# Patient Record
Sex: FEMALE | Race: WHITE | NOT HISPANIC OR LATINO | Employment: OTHER | URBAN - METROPOLITAN AREA
[De-identification: names, ages, dates, MRNs, and addresses within clinical notes are randomized per-mention and may not be internally consistent; named-entity substitution may affect disease eponyms.]

---

## 2017-01-20 ENCOUNTER — TELEPHONE (OUTPATIENT)
Dept: PULMONOLOGY | Facility: HOSPICE | Age: 72
End: 2017-01-20

## 2017-01-20 NOTE — TELEPHONE ENCOUNTER
Pt came into the office. She was having trouble with her mask. She was requesting to have the pressure decreased. Downloaded compliance card. Reviewed with Megha Tilley, she gave me the ok to decrease pressure to 7cm. Changed pressure in the office. Informed pt that if it does not get better she may need a different mask. [place] [facility]. She said she would need an order for a mask. Informed we can do this if it is the mask causing the problem. She will be back in; in about 4 weeks.

## 2017-02-07 ENCOUNTER — HOSPITAL ENCOUNTER (OUTPATIENT)
Dept: LAB | Facility: MEDICAL CENTER | Age: 72
End: 2017-02-07
Attending: NURSE PRACTITIONER
Payer: MEDICARE

## 2017-02-07 LAB
ALBUMIN SERPL BCP-MCNC: 4.2 G/DL (ref 3.2–4.9)
ALBUMIN/GLOB SERPL: 1.4 G/DL
ALP SERPL-CCNC: 96 U/L (ref 30–99)
ALT SERPL-CCNC: 16 U/L (ref 2–50)
ANION GAP SERPL CALC-SCNC: 9 MMOL/L (ref 0–11.9)
APPEARANCE UR: CLEAR
AST SERPL-CCNC: 22 U/L (ref 12–45)
BASOPHILS # BLD AUTO: 0.06 K/UL (ref 0–0.12)
BASOPHILS NFR BLD AUTO: 0.8 % (ref 0–1.8)
BILIRUB SERPL-MCNC: 0.7 MG/DL (ref 0.1–1.5)
BILIRUB UR QL STRIP.AUTO: NEGATIVE
BUN SERPL-MCNC: 14 MG/DL (ref 8–22)
CALCIUM SERPL-MCNC: 9.6 MG/DL (ref 8.5–10.5)
CHLORIDE SERPL-SCNC: 101 MMOL/L (ref 96–112)
CHOLEST SERPL-MCNC: 159 MG/DL (ref 100–199)
CO2 SERPL-SCNC: 26 MMOL/L (ref 20–33)
COLOR UR AUTO: NORMAL
CREAT SERPL-MCNC: 0.94 MG/DL (ref 0.5–1.4)
CULTURE IF INDICATED INDCX: NO UA CULTURE
EOSINOPHIL # BLD: 0.42 K/UL (ref 0–0.51)
EOSINOPHIL NFR BLD AUTO: 5.9 % (ref 0–6.9)
ERYTHROCYTE [DISTWIDTH] IN BLOOD BY AUTOMATED COUNT: 45.7 FL (ref 35.9–50)
GLOBULIN SER CALC-MCNC: 2.9 G/DL (ref 1.9–3.5)
GLUCOSE SERPL-MCNC: 91 MG/DL (ref 65–99)
GLUCOSE UR STRIP.AUTO-MCNC: NEGATIVE MG/DL
HCT VFR BLD AUTO: 42 % (ref 37–47)
HDLC SERPL-MCNC: 50 MG/DL
HGB BLD-MCNC: 14 G/DL (ref 12–16)
IMM GRANULOCYTES # BLD AUTO: 0.02 K/UL (ref 0–0.11)
IMM GRANULOCYTES NFR BLD AUTO: 0.3 % (ref 0–0.9)
KETONES UR STRIP.AUTO-MCNC: NEGATIVE MG/DL
LDLC SERPL CALC-MCNC: 94 MG/DL
LEUKOCYTE ESTERASE UR QL STRIP.AUTO: NEGATIVE
LYMPHOCYTES # BLD: 1.89 K/UL (ref 1–4.8)
LYMPHOCYTES NFR BLD AUTO: 26.8 % (ref 22–41)
MCH RBC QN AUTO: 30.9 PG (ref 27–33)
MCHC RBC AUTO-ENTMCNC: 33.3 G/DL (ref 33.6–35)
MCV RBC AUTO: 92.7 FL (ref 81.4–97.8)
MICRO URNS: NORMAL
MONOCYTES # BLD: 0.58 K/UL (ref 0–0.85)
MONOCYTES NFR BLD AUTO: 8.2 % (ref 0–13.4)
NEUTROPHILS # BLD: 4.09 K/UL (ref 2–7.15)
NEUTROPHILS NFR BLD AUTO: 58 % (ref 44–72)
NITRITE UR QL STRIP.AUTO: NEGATIVE
NRBC # BLD AUTO: 0 K/UL
NRBC BLD-RTO: 0 /100 WBC
PH UR: 6.5 [PH]
PLATELET # BLD AUTO: 224 K/UL (ref 164–446)
PMV BLD AUTO: 9.7 FL (ref 9–12.9)
POTASSIUM SERPL-SCNC: 4.1 MMOL/L (ref 3.6–5.5)
PROT SERPL-MCNC: 7.1 G/DL (ref 6–8.2)
PROT UR QL STRIP: NEGATIVE MG/DL
RBC # BLD AUTO: 4.53 M/UL (ref 4.2–5.4)
RBC UR QL AUTO: NEGATIVE
SODIUM SERPL-SCNC: 136 MMOL/L (ref 135–145)
SP GR UR STRIP.AUTO: 1.01
TRIGL SERPL-MCNC: 74 MG/DL (ref 0–149)
TSH SERPL DL<=0.005 MIU/L-ACNC: 1.19 UIU/ML (ref 0.3–3.7)
WBC # BLD AUTO: 7.1 K/UL (ref 4.8–10.8)

## 2017-02-07 PROCEDURE — 80061 LIPID PANEL: CPT

## 2017-02-07 PROCEDURE — 80053 COMPREHEN METABOLIC PANEL: CPT

## 2017-02-07 PROCEDURE — 36415 COLL VENOUS BLD VENIPUNCTURE: CPT

## 2017-02-07 PROCEDURE — 81003 URINALYSIS AUTO W/O SCOPE: CPT

## 2017-02-07 PROCEDURE — 84443 ASSAY THYROID STIM HORMONE: CPT

## 2017-02-07 PROCEDURE — 85025 COMPLETE CBC W/AUTO DIFF WBC: CPT

## 2017-02-10 ENCOUNTER — HOSPITAL ENCOUNTER (OUTPATIENT)
Dept: RADIOLOGY | Facility: MEDICAL CENTER | Age: 72
End: 2017-02-10
Attending: FAMILY MEDICINE
Payer: MEDICARE

## 2017-02-10 DIAGNOSIS — J40 BRONCHITIS, NOT SPECIFIED AS ACUTE OR CHRONIC: ICD-10-CM

## 2017-02-10 PROCEDURE — 71020 DX-CHEST-2 VIEWS: CPT

## 2017-02-27 ENCOUNTER — APPOINTMENT (OUTPATIENT)
Dept: PULMONOLOGY | Facility: HOSPICE | Age: 72
End: 2017-02-27
Payer: MEDICARE

## 2017-03-15 ENCOUNTER — HOSPITAL ENCOUNTER (OUTPATIENT)
Dept: RADIOLOGY | Facility: MEDICAL CENTER | Age: 72
End: 2017-03-15
Attending: FAMILY MEDICINE
Payer: MEDICARE

## 2017-03-15 DIAGNOSIS — Z13.9 SCREENING: ICD-10-CM

## 2017-03-15 DIAGNOSIS — N95.1 SYMPTOMATIC MENOPAUSAL OR FEMALE CLIMACTERIC STATES: ICD-10-CM

## 2017-03-15 PROCEDURE — 77063 BREAST TOMOSYNTHESIS BI: CPT

## 2017-03-15 PROCEDURE — 77080 DXA BONE DENSITY AXIAL: CPT

## 2017-03-23 ENCOUNTER — OFFICE VISIT (OUTPATIENT)
Dept: PULMONOLOGY | Facility: HOSPICE | Age: 72
End: 2017-03-23
Payer: MEDICARE

## 2017-03-23 VITALS
SYSTOLIC BLOOD PRESSURE: 138 MMHG | RESPIRATION RATE: 15 BRPM | BODY MASS INDEX: 46.01 KG/M2 | DIASTOLIC BLOOD PRESSURE: 88 MMHG | WEIGHT: 250 LBS | HEIGHT: 62 IN | TEMPERATURE: 98.3 F | HEART RATE: 82 BPM

## 2017-03-23 DIAGNOSIS — I10 ESSENTIAL HYPERTENSION: ICD-10-CM

## 2017-03-23 DIAGNOSIS — J30.2 SEASONAL ALLERGIC RHINITIS, UNSPECIFIED ALLERGIC RHINITIS TRIGGER: ICD-10-CM

## 2017-03-23 DIAGNOSIS — G47.33 OSA (OBSTRUCTIVE SLEEP APNEA): ICD-10-CM

## 2017-03-23 DIAGNOSIS — J45.40 MODERATE PERSISTENT ASTHMA WITHOUT COMPLICATION: ICD-10-CM

## 2017-03-23 PROCEDURE — 1036F TOBACCO NON-USER: CPT | Performed by: NURSE PRACTITIONER

## 2017-03-23 PROCEDURE — 1101F PT FALLS ASSESS-DOCD LE1/YR: CPT | Mod: 8P | Performed by: NURSE PRACTITIONER

## 2017-03-23 PROCEDURE — 4040F PNEUMOC VAC/ADMIN/RCVD: CPT | Performed by: NURSE PRACTITIONER

## 2017-03-23 PROCEDURE — 3017F COLORECTAL CA SCREEN DOC REV: CPT | Performed by: NURSE PRACTITIONER

## 2017-03-23 PROCEDURE — 99214 OFFICE O/P EST MOD 30 MIN: CPT | Performed by: NURSE PRACTITIONER

## 2017-03-23 PROCEDURE — 3014F SCREEN MAMMO DOC REV: CPT | Performed by: NURSE PRACTITIONER

## 2017-03-23 PROCEDURE — G8419 CALC BMI OUT NRM PARAM NOF/U: HCPCS | Performed by: NURSE PRACTITIONER

## 2017-03-23 PROCEDURE — G8484 FLU IMMUNIZE NO ADMIN: HCPCS | Performed by: NURSE PRACTITIONER

## 2017-03-23 PROCEDURE — G8432 DEP SCR NOT DOC, RNG: HCPCS | Performed by: NURSE PRACTITIONER

## 2017-03-23 NOTE — PROGRESS NOTES
"Chief Complaint   Patient presents with   • Follow-Up     6W w/ Chip DL          HPI: This patient is a 71 y.o. female, who presents for 6 week follow-up of BEATRIZ. Also has history of asthma. In regards to asthma, former PFTs indicate an FEV1 of 2.40 L 109% predicted, no significant bronchodilator response. She has significant seasonal allergies. She remains compliant with Advair 500/50 twice a day, Singulair daily, albuterol HFA as needed, Flonase daily. She has seen an allergist in the past but has not seen anyone for many years. Her breathing remains stable. Denies dyspnea, cough or wheeze. She denies URIs and she was last seen. She is up-to-date on influenza vaccination.    In regards to BEATRIZ, former PSG indicates mild BEATRIZ with an AHI of 12.4 and a minimum saturation of 84%.  she was ordered and the new machine at her last visit. She's been unable to use this. She feels the pressure is too high. Her pressure was decreased from 9 to 7 cm H2O when she called in January. AHI on download today is 16.9. She is using a fullface mask. She feels this is comfortable. She does not notice significant air leak. She continues to feel fatigue and has occasionally headaches. Titration study is recommended. She is amenable to this. She is pending, left knee surgery.    She has a difficult time with recalling words, memory and speech due to previous infection.    Past Medical History   Diagnosis Date   • GERD (gastroesophageal reflux disease)    • Arthritis    • Asthma    • Hypertension    • Depression    • Hiatus hernia syndrome    • Snoring    • Sleep apnea      CPAP   • Pain      back, knees   • Thrush      from \"Advair\", takes nystatin   • Sorethroat 2/09/2016   • GERD (gastroesophageal reflux disease)    • Obstructive sleep apnea    • DJD (degenerative joint disease)    • Carpal tunnel syndrome    • Obesity        Social History   Substance Use Topics   • Smoking status: Former Smoker -- 1.00 packs/day for 6 years     Types: " Cigarettes     Start date: 01/01/1972     Quit date: 01/01/1978   • Smokeless tobacco: None      Comment: 1978   • Alcohol Use: No       Family History   Problem Relation Age of Onset   • Heart Disease     • Hypertension     • Stroke     • Lung Disease     • Heart Disease Father        Current medications as of today   Current Outpatient Prescriptions   Medication Sig Dispense Refill   • Azelastine HCl 0.15 % Solution Spray 1-2 Sprays in nose 2 times a day as needed.     • losartan-hydrochlorothiazide (HYZAAR) 100-25 MG per tablet Take 1 Tab by mouth every day.     • ibuprofen (MOTRIN) 800 MG Tab Take 800 mg by mouth every 8 hours as needed.     • guaifenesin-codeine (CHERATUSSIN AC) Solution oral solution Take 5-10 mL by mouth every 6 hours as needed for Cough. 90 mL 0   • diphenhydrAMINE (BENADRYL) 50 MG Cap Take 50 mg by mouth at bedtime as needed.     • gabapentin (NEURONTIN) 300 MG CAPS Take 1 Cap by mouth 3 times a day. 90 Cap 0   • fluticasone-salmeterol (ADVAIR) 500-50 MCG/DOSE AEPB Inhale 1 Puff by mouth every 12 hours.     • albuterol (PROAIR HFA) 108 (90 BASE) MCG/ACT AERS Inhale 2 Puffs by mouth every 6 hours as needed. Shortness of breath  Indications: Asthma     • losartan (COZAAR) 100 MG TABS Take 100 mg by mouth every day.     • nystatin (MYCOSTATIN) 681845 UNIT/ML SUSP Take 100,000 Units by mouth 5 Times a Day.     • Multiple Vitamins-Minerals (BODY/HAIR/SKIN/NAILS PO) Take 1 Cap by mouth every day.     • montelukast (SINGULAIR) 10 MG TABS Take 10 mg by mouth every morning.     • trazodone (DESYREL) 50 MG TABS Take 50 mg by mouth every evening.     • fluticasone (FLONASE) 50 MCG/ACT nasal spray Spray 1 Spray in nose every morning. Each Nostril     • omeprazole (PRILOSEC) 20 MG CPDR Take 20 mg by mouth 2 Times a Day.     • albuterol 108 (90 BASE) MCG/ACT Aero Soln inhalation aerosol Inhale 2 Puffs by mouth every 6 hours as needed for Shortness of Breath.     • levofloxacin (LEVAQUIN) 500 MG tablet  "Take 1 Tab by mouth every day. 10 Tab 0   • sertraline (ZOLOFT) 100 MG TABS Take 100 mg by mouth every morning.       No current facility-administered medications for this visit.       Allergies: Allergy; Cefepime; Cephalosporins; Clarithromycin; Cleocin; Meropenem; Morphine; and Sulfamethoxazole-trimethoprim    Blood pressure 138/88, pulse 82, temperature 36.8 °C (98.3 °F), temperature source Oral, resp. rate 15, height 1.575 m (5' 2\"), weight 113.399 kg (250 lb).      ROS:   Constitutional: Denies fevers, chills, night sweats, weight loss. Positive for fatigue.  HEENT: Denies earache, difficulty hearing, tinnitus, nasal congestion, hoarseness  Cardiovascular: Denies chest pain, tightness, palpitations, orthopnea or edema  Respiratory: See HPI  Sleep: See history of present illness  GI: Denies heartburn, dysphagia, nausea, abdominal pain, diarrhea or constipation  : Denies frequent urination, hematuria, discharge or painful urination  Musculoskeletal: Positive for back pain and knee pain  Neurological: Denies weakness or headaches  Skin: No rashes    Physical exam:   Appearance: Obese, in no acute distress  HEENT: Normocephalic, atraumatic, white sclera, PERRLA  Respiratory: no intercostal retractions or accessory muscle use   Lungs auscultation: Diminished breath sounds bilateral bases, otherwise clear   Cardiovascular: Regular rate rhythm no murmurs, rubs or gallops  Gait: Normal  Digits: No clubbing, cyanosis  Motor: No focal deficits  Orientation: Oriented to time, person and place    Diagnosis:  1. BEATRIZ (obstructive sleep apnea)  POLYSOMNOGRAPHY TITRATION   2. Moderate persistent asthma without complication     3. Essential hypertension     4. Seasonal allergic rhinitis, unspecified allergic rhinitis trigger         Plan:  1. Continue Advair 230/21 µg, 2 puffs, twice a day.  2. Continue albuterol as needed  3. Continue OTC antihistamine daily  4. Titration study ASAP.  5. Follow up after testing to " review

## 2017-03-23 NOTE — PATIENT INSTRUCTIONS
1. Continue respiratory medications  2. Continue OTC antihistamine  3. Titration study ASAP  4. Follow up after testing to review results

## 2017-03-23 NOTE — MR AVS SNAPSHOT
"        Cyndy Petit   3/23/2017 2:20 PM   Office Visit   MRN: [id number]    Department:  [facility]   Dept Phone:  [phone]    Description:  Female : 1945   Provider:  JACK Gilbert           Reason for Visit     Follow-Up 6W w/ Chip DL       Allergies as of 3/23/2017     Allergen Noted Reactions    Allergy 2013       Unknown antibiotic    Cefepime 2010   Hives    Cephalosporins 2010   Hives    Clarithromycin 2010   Hives    Cleocin [Clindamycin] 2016   Itching    Meropenem 2013       Morphine 2013   Itching    Sulfamethoxazole-Trimethoprim 2013   Itching      You were diagnosed with     BEATRIZ (obstructive sleep apnea)   [213793]       Moderate persistent asthma without complication   [957882]       Essential hypertension   [0310637]       Seasonal allergic rhinitis, unspecified allergic rhinitis trigger   [4708432]         Vital Signs     Blood Pressure Pulse Temperature Respirations Height Weight    138/88 mmHg 82 36.8 °C (98.3 °F) (Oral) 15 1.575 m (5' 2\") 113.399 kg (250 lb)    Body Mass Index Smoking Status                45.71 kg/m2 Former Smoker          Basic Information     Date Of Birth Sex Race Ethnicity Preferred Language    1945 Female White Non- English      Problem List              ICD-10-CM Priority Class Noted - Resolved    Back pain M54.9   2013 - Present    Hyponatremia E87.1 Low  2013 - Present    GERD (gastroesophageal reflux disease) K21.9 Low  2013 - Present    HTN (hypertension) I10 Medium  2013 - Present    S/P laminectomy Z98.890 High  2013 - Present    Lumbosacral radiculopathy at S1 M54.17 High  2013 - Present    Hyperglycemia R73.9 Medium  2013 - Present    Primary osteoarthritis of left knee M17.12   3/7/2016 - Present    Hypokalemia E87.6   3/10/2016 - Present    H/O knee surgery Z98.890   3/10/2016 - Present    Pain and swelling of left knee " M25.562, M25.462   3/10/2016 - Present    Moderate persistent asthma without complication J45.40   12/6/2016 - Present    BEATRIZ (obstructive sleep apnea) G47.33   12/6/2016 - Present    Seasonal allergies J30.2   12/6/2016 - Present      Health Maintenance        Date Due Completion Dates    IMM DTaP/Tdap/Td Vaccine (1 - Tdap) 11/25/1964 ---    PAP SMEAR 11/25/1966 ---    COLONOSCOPY 11/25/1995 ---    IMM ZOSTER VACCINE 11/25/2005 ---    IMM PNEUMOCOCCAL 65+ (ADULT) LOW/MEDIUM RISK SERIES (1 of 2 - PCV13) 11/25/2010 1/1/2009    IMM INFLUENZA (1) 9/1/2016 10/1/2015, 9/11/2013, 10/1/2012    MAMMOGRAM 3/15/2018 3/15/2017, 12/10/2015, 8/18/2014, 8/14/2013, 7/30/2012, 7/21/2011, 7/20/2010, 7/20/2010, 2/11/2008, 2/11/2008, 1/19/2007, 1/18/2006    BONE DENSITY 3/15/2022 3/15/2017, 8/18/2014, 8/26/2010, 1/19/2007            Current Immunizations     Influenza TIV (IM) 10/1/2015, 9/11/2013  4:37 AM, 10/1/2012    Pneumococcal polysaccharide vaccine (PPSV-23) 1/1/2009      Below and/or attached are the medications your provider expects you to take. Review all of your home medications and newly ordered medications with your provider and/or pharmacist. Follow medication instructions as directed by your provider and/or pharmacist. Please keep your medication list with you and share with your provider. Update the information when medications are discontinued, doses are changed, or new medications (including over-the-counter products) are added; and carry medication information at all times in the event of emergency situations     Allergies:  ALLERGY - (reactions not documented)     CEFEPIME - Hives     CEPHALOSPORINS - Hives     CLARITHROMYCIN - Hives     CLEOCIN - Itching     MEROPENEM - (reactions not documented)     MORPHINE - Itching     SULFAMETHOXAZOLE-TRIMETHOPRIM - Itching               Medications  Valid as of: March 23, 2017 -  2:50 PM    Generic Name Brand Name Tablet Size Instructions for use    Albuterol Sulfate (Aero  Soln) albuterol 108 (90 BASE) MCG/ACT Inhale 2 Puffs by mouth every 6 hours as needed. Shortness of breath  Indications: Asthma        Albuterol Sulfate (Aero Soln) albuterol 108 (90 BASE) MCG/ACT Inhale 2 Puffs by mouth every 6 hours as needed for Shortness of Breath.        Azelastine HCl (Solution) Azelastine HCl 0.15 % Spray 1-2 Sprays in nose 2 times a day as needed.        DiphenhydrAMINE HCl (Cap) BENADRYL 50 MG Take 50 mg by mouth at bedtime as needed.        Fluticasone Propionate (Suspension) FLONASE 50 MCG/ACT Spray 1 Spray in nose every morning. Each Nostril        Fluticasone-Salmeterol (AEROSOL POWDER, BREATH ACTIVATED) ADVAIR 500-50 MCG/DOSE Inhale 1 Puff by mouth every 12 hours.        Gabapentin (Cap) NEURONTIN 300 MG Take 1 Cap by mouth 3 times a day.        Guaifenesin-Codeine (Solution) ROBITUSSIN -10 mg/5mL Take 5-10 mL by mouth every 6 hours as needed for Cough.        Ibuprofen (Tab) MOTRIN 800 MG Take 800 mg by mouth every 8 hours as needed.        LevoFLOXacin (Tab) LEVAQUIN 500 MG Take 1 Tab by mouth every day.        Losartan Potassium (Tab) COZAAR 100 MG Take 100 mg by mouth every day.        Losartan Potassium-HCTZ (Tab) HYZAAR 100-25 MG Take 1 Tab by mouth every day.        Montelukast Sodium (Tab) SINGULAIR 10 MG Take 10 mg by mouth every morning.        Multiple Vitamins-Minerals   Take 1 Cap by mouth every day.        Nystatin (Suspension) MYCOSTATIN 140261 UNIT/ML Take 100,000 Units by mouth 5 Times a Day.        Omeprazole (CAPSULE DELAYED RELEASE) PRILOSEC 20 MG Take 20 mg by mouth 2 Times a Day.        Sertraline HCl (Tab) ZOLOFT 100 MG Take 100 mg by mouth every morning.        TraZODone HCl (Tab) DESYREL 50 MG Take 50 mg by mouth every evening.        .                 Medicines prescribed today were sent to:     [facility] [address] [place] [address], [place] - [address] [facility]    [address] [facility] [place] [address]    Phone: [phone] Fax: [phone]    Open 24 Hours?:  No      Medication refill instructions:       If your prescription bottle indicates you have medication refills left, it is not necessary to call your provider’s office. Please contact your pharmacy and they will refill your medication.    If your prescription bottle indicates you do not have any refills left, you may request refills at any time through one of the following ways: The online zealot network system (except Urgent Care), by calling your provider’s office, or by asking your pharmacy to contact your provider’s office with a refill request. Medication refills are processed only during regular business hours and may not be available until the next business day. Your provider may request additional information or to have a follow-up visit with you prior to refilling your medication.   *Please Note: Medication refills are assigned a new Rx number when refilled electronically. Your pharmacy may indicate that no refills were authorized even though a new prescription for the same medication is available at the pharmacy. Please request the medicine by name with the pharmacy before contacting your provider for a refill.        Your To Do List     Future Labs/Procedures Complete By Expires    POLYSOMNOGRAPHY TITRATION  As directed 3/23/2018      Instructions    1. Continue respiratory medications  2. Continue OTC antihistamine  3. Titration study ASAP  4. Follow up after testing to review results          zealot network Access Code: Activation code not generated  Current zealot network Status: Active

## 2017-03-28 ENCOUNTER — SLEEP STUDY (OUTPATIENT)
Dept: SLEEP MEDICINE | Facility: MEDICAL CENTER | Age: 72
End: 2017-03-28
Attending: NURSE PRACTITIONER
Payer: MEDICARE

## 2017-03-28 DIAGNOSIS — G47.33 OSA (OBSTRUCTIVE SLEEP APNEA): ICD-10-CM

## 2017-03-28 PROCEDURE — 95811 POLYSOM 6/>YRS CPAP 4/> PARM: CPT | Performed by: INTERNAL MEDICINE

## 2017-03-29 NOTE — PROCEDURES
Clinical Comments:  The patient underwent a comprehensive polysomnogram using the standard montage for measurement of parameters of sleep, respiratory events, movement abnormalities, heart rate and rhythm. A microphone was used to monitor snoring.      INTERPRETATION:  The total recording time was 457.3 minutes with a sleep period of 456.3 minutes and the total sleep time was 432.0 minutes with a sleep efficiency of 94.5%.  The sleep latency was 1.1 minutes, and REM latency was 134.5 minutes.  The patient experienced 48 arousals in total, for an arousal index of 6.7    RESPIRATORY: The patient had 11 apneas in total.  Of these, 1 were obstructive apneas, and 10 were central apneas.  This resulted in an apnea index (AI) of 1.5.  The patient had 20 hypopneas, for a hypopnea index of 2.8.  The overall AHI was 4.3, while the AHI during Stage R sleep was 8.5.  AHI while supine was 9.8.    OXIMETRY: Oxygen saturation monitoring showed a mean SpO2 of 93.3%, with a minimum oxygen saturation of 81.0%.  Oxygen saturations were less than or = 89% for 2.8 minutes of sleep time.    CARDIAC: The highest heart rate during the recording was 113.0 beats per minute.  The average heart rate during sleep was 67.7 bpm.    LIMB MOVEMENTS: There were a total of 0 PLMs during sleep, of which 0 were PLMs arousals.  This resulted in a PLMS index of 0.0.    71-year-old with a history of hypertension and asthma as well as sleep apnea who comes in for a CPAP titration reevaluation study.    Technical summary: The patient underwent a CPAP titration.  This was a 16 channel montage study to include a 6 channel EEG, a 2 channel EOG, and chin EMG, left and right leg EMG, a snore channel, and a CFLOW pressure transducer.   Respiratory effort was assessed with the use of a thoracic and abdominal monitor and overnight oximetry was obtained. Audio and video recordings were reviewed. This was a fully attended study and sleep stage scoring was  performed. The test was technically adequate.    General sleep summary:  During the overnight study, there was a normal sleep latency and mildly prolonged REM latency.   The total sleep time was 432 minute and sleep efficiency was 95%.  Sleep stage proportions showed 2% stage I, 57% stage II, 8% delta sleep and 32% REM sleep.  In regards to sleep quality there was a normal degree of sleep fragmentation as shown by the arousal index of 7 an hour. The arousals were due to some respiratory effort related arousals and hypopneas.    CPAP Titration:  The CPAP pressure was initiated at 4 cm of water and the pressure was increased in an attempt to eliminate all sleep disordered breathing and snoring. The CPAP pressure was increased to 14 cm water and at this final pressure the patient was observed in the supine position and in the REM sleep stage. The apnea hypopnea index was 0.0 per hour and the low oxygen saturation 92%. Higher CPAP pressures were applied up to 16 cm water however these higher pressures did not appear to improve breathing or sleep quality beyond what was observed at a pressure of 14 cm of water. Snoring was resolved. There were no significant periodic limb movements.  The patient demonstrated normal sinus rhythm and an average heart rate of 67 beats per minute. Rare PVCs were observed however there was no tachyarrhythmias. The patient utilized a small air-fit F10 full face mask with heated humidification. The CPAP was well-tolerated and there were minimal air leaks. No supplemental oxygen was required.    Impression:  1. Successful CPAP titration to 14 cm of water using a small AirFit F10 full face mask and heated humidity  2. Obstructive sleep apnea  3. Hypertension  4. Asthma  5. Rare PVCs    Recommendations:  Recommend CPAP at the above settings. Recommended a data card that can measure leak, apnea hypopnea index and compliance for further outpatient monitoring and management of CPAP therapy. In some  cases alternative treatment options may prove effective in resolving sleep apnea and these options include upper airway surgery, the use of a dental orthotic or weight loss and positional therapy. Rare PVCs were observed and clinical correlation is required in regards to cardiology evaluation for PVCs. In general patients with sleep apnea are advised to avoid alcohol and sedatives and to not operate a motor vehicle while drowsy. Also untreated sleep apnea is associated with an increased risk for cardiovascular disease.

## 2017-04-05 ENCOUNTER — OFFICE VISIT (OUTPATIENT)
Dept: PULMONOLOGY | Facility: HOSPICE | Age: 72
End: 2017-04-05
Payer: MEDICARE

## 2017-04-05 VITALS
HEART RATE: 70 BPM | SYSTOLIC BLOOD PRESSURE: 140 MMHG | RESPIRATION RATE: 16 BRPM | TEMPERATURE: 97.5 F | DIASTOLIC BLOOD PRESSURE: 80 MMHG | WEIGHT: 250 LBS | BODY MASS INDEX: 46.01 KG/M2 | HEIGHT: 62 IN

## 2017-04-05 DIAGNOSIS — G47.33 OSA (OBSTRUCTIVE SLEEP APNEA): ICD-10-CM

## 2017-04-05 DIAGNOSIS — J45.40 MODERATE PERSISTENT ASTHMA WITHOUT COMPLICATION: ICD-10-CM

## 2017-04-05 PROCEDURE — 1101F PT FALLS ASSESS-DOCD LE1/YR: CPT | Mod: 8P | Performed by: NURSE PRACTITIONER

## 2017-04-05 PROCEDURE — 99213 OFFICE O/P EST LOW 20 MIN: CPT | Performed by: NURSE PRACTITIONER

## 2017-04-05 PROCEDURE — G8432 DEP SCR NOT DOC, RNG: HCPCS | Performed by: NURSE PRACTITIONER

## 2017-04-05 PROCEDURE — 3017F COLORECTAL CA SCREEN DOC REV: CPT | Performed by: NURSE PRACTITIONER

## 2017-04-05 PROCEDURE — 3014F SCREEN MAMMO DOC REV: CPT | Performed by: NURSE PRACTITIONER

## 2017-04-05 PROCEDURE — G8419 CALC BMI OUT NRM PARAM NOF/U: HCPCS | Performed by: NURSE PRACTITIONER

## 2017-04-05 PROCEDURE — 4040F PNEUMOC VAC/ADMIN/RCVD: CPT | Performed by: NURSE PRACTITIONER

## 2017-04-05 PROCEDURE — 1036F TOBACCO NON-USER: CPT | Performed by: NURSE PRACTITIONER

## 2017-04-05 RX ORDER — ALBUTEROL SULFATE 2.5 MG/3ML
SOLUTION RESPIRATORY (INHALATION)
COMMUNITY
Start: 2017-02-14 | End: 2017-11-14

## 2017-04-05 RX ORDER — ESCITALOPRAM OXALATE 20 MG/1
TABLET ORAL
COMMUNITY
Start: 2017-04-04 | End: 2017-08-21

## 2017-04-05 RX ORDER — PREDNISONE 20 MG/1
TABLET ORAL
COMMUNITY
Start: 2017-02-09 | End: 2017-04-17

## 2017-04-05 RX ORDER — ESCITALOPRAM OXALATE 10 MG/1
TABLET ORAL
COMMUNITY
Start: 2017-02-09 | End: 2017-04-17

## 2017-04-05 NOTE — PROGRESS NOTES
"Chief Complaint   Patient presents with   • Results     SS         HPI:  This is a 71 y.o. female with a history of asthma and obstructive sleep apnea. Pulmonary function tests from 6/16/15 indicate FEV1 2.4 L, 109% predicted, FEV1/FVC 76%, FEF 25-75% 99% predicted, and DLCO 150% predicted. The patient is compliant with Advair 504/50 µg twice daily, Singulair nightly, and albuterol HFA as needed. The patient reports that her breathing is stable. She denies significant shortness of breath, cough, wheezing, fever, chills, sweats, or hemoptysis.    Polysomnogram indicates AHI 12.4 and minimum saturation 84%. The patient is compliant with CPAP 7 cm. Compliance dated 3/6-4/4/17 indicates 67% complaints for 4 hours 38 minutes. The patient's AHI is still elevated at 13.7. Due to ongoing elevated AHI the patient has underwent titration study. The patient was titrated on CPAP 6-16 centimeters. On CPAP 16 cm the patient had when and where 2 minutes of REM sleep, AHI was reduced to 3.2, and basal saturation was 93%. The patient feels she is going to tolerate higher pressure. Currently she is tired all the time. She is having some issues with a dry mouth. She has adjusted her humidifier. She has a heated tube.      Past Medical History   Diagnosis Date   • GERD (gastroesophageal reflux disease)    • Arthritis    • Asthma    • Hypertension    • Depression    • Hiatus hernia syndrome    • Snoring    • Sleep apnea      CPAP   • Pain      back, knees   • Thrush      from \"Advair\", takes nystatin   • Sorethroat 2/09/2016   • GERD (gastroesophageal reflux disease)    • Obstructive sleep apnea    • DJD (degenerative joint disease)    • Carpal tunnel syndrome    • Obesity        Past Surgical History   Procedure Laterality Date   • Foraminotomy  11/26/08     Performed by [facility] STALLWORTH at SURGERY [facility] ORS   • Hardware removal neuro  11/26/08     Performed by [facility] STALLWORTH at SURGERY [facility] ORS   • Other neurological " "surg  2/2009     I&D of brain from sinus infection   • Cholecystectomy  2010     laparoscopic   • Lumbar laminectomy diskectomy  5/30/2013     Performed by Kaushal Ayala M.D. at SURGERY [facility]   • Lumbar fusion posterior  9/13/2013     Performed by Kaushal Ayala M.D. at SURGERY [facility]   • Lumbar laminectomy diskectomy  9/13/2013     Performed by Kaushal Ayala M.D. at SURGERY [facility]   • Carpal tunnel release Right 2000   • Lumbar fusion posterior  2005   • Shoulder arthrotomy Right 2000   • Knee arthroscopy Right 2008   • Knee arthroplasty total Left 3/7/2016     Procedure: KNEE ARTHROPLASTY TOTAL;  Surgeon: Jesús Rutledge M.D.;  Location: SURGERY [facility];  Service:        Social History   Substance Use Topics   • Smoking status: Former Smoker -- 1.00 packs/day for 6 years     Types: Cigarettes     Start date: 01/01/1972     Quit date: 01/01/1978   • Smokeless tobacco: None      Comment: 1978   • Alcohol Use: No       ROS:   Constitutional: Denies fevers, chills, sweats, fatigue, and weight loss.  Eyes: Glasses.  Ears/nose/mouth/throat: Denies injury.  Cardiovascular: Denies chest pain, tightness.  Respiratory: See history of present illness.  GI: Denies heartburn, difficulty swallowing, nausea, and vomiting.  Neurological: Denies frequent headaches, dizziness, weakness.    Vitals:  Filed Vitals:    04/05/17 1017   Height: 1.575 m (5' 2.01\")   Weight: 113.399 kg (250 lb)   Weight % change since last entry.: 0 %   BP: 140/80   Pulse: 70   BMI (Calculated): 45.71   Resp: 16   Temp: 36.4 °C (97.5 °F)   O2 sat % room air: 94 %       Allergies:  Allergy; Cefepime; Cephalosporins; Clarithromycin; Cleocin; Meropenem; Morphine; and Sulfamethoxazole-trimethoprim    Medications:  Current Outpatient Prescriptions   Medication Sig Dispense Refill   • escitalopram (LEXAPRO) 20 MG tablet      • albuterol (PROVENTIL) 2.5mg/3ml Nebu Soln solution for nebulization      • Azelastine HCl 0.15 " % Solution Spray 1-2 Sprays in nose 2 times a day as needed.     • ibuprofen (MOTRIN) 800 MG Tab Take 800 mg by mouth every 8 hours as needed.     • guaifenesin-codeine (CHERATUSSIN AC) Solution oral solution Take 5-10 mL by mouth every 6 hours as needed for Cough. 90 mL 0   • gabapentin (NEURONTIN) 300 MG CAPS Take 1 Cap by mouth 3 times a day. 90 Cap 0   • fluticasone-salmeterol (ADVAIR) 500-50 MCG/DOSE AEPB Inhale 1 Puff by mouth every 12 hours.     • albuterol (PROAIR HFA) 108 (90 BASE) MCG/ACT AERS Inhale 2 Puffs by mouth every 6 hours as needed. Shortness of breath  Indications: Asthma     • losartan (COZAAR) 100 MG TABS Take 100 mg by mouth every day.     • nystatin (MYCOSTATIN) 953537 UNIT/ML SUSP Take 100,000 Units by mouth 5 Times a Day.     • Multiple Vitamins-Minerals (BODY/HAIR/SKIN/NAILS PO) Take 1 Cap by mouth every day.     • montelukast (SINGULAIR) 10 MG TABS Take 10 mg by mouth every morning.     • trazodone (DESYREL) 50 MG TABS Take 50 mg by mouth every evening.     • fluticasone (FLONASE) 50 MCG/ACT nasal spray Spray 1 Spray in nose every morning. Each Nostril     • omeprazole (PRILOSEC) 20 MG CPDR Take 20 mg by mouth 2 Times a Day.     • escitalopram (LEXAPRO) 10 MG Tab      • predniSONE (DELTASONE) 20 MG Tab      • albuterol 108 (90 BASE) MCG/ACT Aero Soln inhalation aerosol Inhale 2 Puffs by mouth every 6 hours as needed for Shortness of Breath.     • losartan-hydrochlorothiazide (HYZAAR) 100-25 MG per tablet Take 1 Tab by mouth every day.     • levofloxacin (LEVAQUIN) 500 MG tablet Take 1 Tab by mouth every day. 10 Tab 0   • diphenhydrAMINE (BENADRYL) 50 MG Cap Take 50 mg by mouth at bedtime as needed.     • sertraline (ZOLOFT) 100 MG TABS Take 100 mg by mouth every morning.       No current facility-administered medications for this visit.       PHYSICAL EXAM:  Appearance: Well-developed, well-nourished, no acute distress.  Eyes. PERRL.  Hearing: Grossly intact.  Oropharynx: Tongue normal,  posterior pharynx without erythema or exudate.  Respiratory effort: No intercostal retractions or use of accessory muscles.  Lung auscultation: No crackles, wheezing.  Heart auscultation: No murmur, gallop, or rub. Regular rate and rhythm.  Extremities: No cyanosis or edema.  Gait and Station: Ambulates with cane  Orientation: Oriented to time, place, and person.    Assessment:  1. Moderate persistent asthma without complication     2. BEATRIZ (obstructive sleep apnea)  DME OTHER         Plan:  1. Increase CPAP to 16 cm.  2. Clean equipment weekly and replace supplies as allowed by insurance.  3. Patient encouraged to participate in daily exercise.  4. Continue Advair 500/50 µg twice daily, Singulair daily, and albuterol HFA as needed.  5. Follow up in approximately 2 months with RODO Pearl.    Return in about 2 months (around 6/5/2017).

## 2017-04-05 NOTE — MR AVS SNAPSHOT
"        Cyndy Sharpe Petit   2017 10:40 AM   Office Visit   MRN: [id number]    Department:  [facility]   Dept Phone:  [phone]    Description:  Female : 1945   Provider:  TANGELA Villagran           Reason for Visit     Results SS      Allergies as of 2017     Allergen Noted Reactions    Allergy 2013       Unknown antibiotic    Cefepime 2010   Hives    Cephalosporins 2010   Hives    Clarithromycin 2010   Hives    Cleocin [Clindamycin] 2016   Itching    Meropenem 2013       Morphine 2013   Itching    Sulfamethoxazole-Trimethoprim 2013   Itching      You were diagnosed with     Moderate persistent asthma without complication   [511205]       BEATRIZ (obstructive sleep apnea)   [559792]         Vital Signs     Blood Pressure Pulse Temperature Respirations Height Weight    140/80 mmHg 70 36.4 °C (97.5 °F) 16 1.575 m (5' 2.01\") 113.399 kg (250 lb)    Body Mass Index Smoking Status                45.71 kg/m2 Former Smoker          Basic Information     Date Of Birth Sex Race Ethnicity Preferred Language    1945 Female White Non- English      Your appointments     Arturo 15, 2017 11:20 AM   Established Patient Pul with JACK Gilbert   [facility] (--)    [address] [place] [address] [address]  [facility] [id number] [phone]              Problem List              ICD-10-CM Priority Class Noted - Resolved    Back pain M54.9   2013 - Present    Hyponatremia E87.1 Low  2013 - Present    GERD (gastroesophageal reflux disease) K21.9 Low  2013 - Present    HTN (hypertension) I10 Medium  2013 - Present    S/P laminectomy Z98.890 High  2013 - Present    Lumbosacral radiculopathy at S1 M54.17 High  2013 - Present    Hyperglycemia R73.9 Medium  2013 - Present    Primary osteoarthritis of left knee M17.12   3/7/2016 - Present    Hypokalemia E87.6   3/10/2016 - Present    H/O knee " surgery Z98.890   3/10/2016 - Present    Pain and swelling of left knee M25.562, M25.462   3/10/2016 - Present    Moderate persistent asthma without complication J45.40   12/6/2016 - Present    BEATRIZ (obstructive sleep apnea) G47.33   12/6/2016 - Present    Seasonal allergies J30.2   12/6/2016 - Present      Health Maintenance        Date Due Completion Dates    IMM DTaP/Tdap/Td Vaccine (1 - Tdap) 11/25/1964 ---    PAP SMEAR 11/25/1966 ---    COLONOSCOPY 11/25/1995 ---    IMM ZOSTER VACCINE 11/25/2005 ---    IMM PNEUMOCOCCAL 65+ (ADULT) LOW/MEDIUM RISK SERIES (1 of 2 - PCV13) 11/25/2010 1/1/2009    MAMMOGRAM 3/15/2018 3/15/2017, 12/10/2015, 8/18/2014, 8/14/2013, 7/30/2012, 7/21/2011, 7/20/2010, 7/20/2010, 2/11/2008, 2/11/2008, 1/19/2007, 1/18/2006    BONE DENSITY 3/15/2022 3/15/2017, 8/18/2014, 8/26/2010, 1/19/2007            Current Immunizations     13-VALENT PCV PREVNAR 10/1/2015    Influenza TIV (IM) 10/1/2016, 10/1/2015, 9/11/2013  4:37 AM    Pneumococcal polysaccharide vaccine (PPSV-23) 1/1/2009      Below and/or attached are the medications your provider expects you to take. Review all of your home medications and newly ordered medications with your provider and/or pharmacist. Follow medication instructions as directed by your provider and/or pharmacist. Please keep your medication list with you and share with your provider. Update the information when medications are discontinued, doses are changed, or new medications (including over-the-counter products) are added; and carry medication information at all times in the event of emergency situations     Allergies:  ALLERGY - (reactions not documented)     CEFEPIME - Hives     CEPHALOSPORINS - Hives     CLARITHROMYCIN - Hives     CLEOCIN - Itching     MEROPENEM - (reactions not documented)     MORPHINE - Itching     SULFAMETHOXAZOLE-TRIMETHOPRIM - Itching               Medications  Valid as of: April 05, 2017 - 11:10 AM    Generic Name Brand Name Tablet Size  Instructions for use    Albuterol Sulfate (Aero Soln) albuterol 108 (90 BASE) MCG/ACT Inhale 2 Puffs by mouth every 6 hours as needed. Shortness of breath  Indications: Asthma        Albuterol Sulfate (Aero Soln) albuterol 108 (90 BASE) MCG/ACT Inhale 2 Puffs by mouth every 6 hours as needed for Shortness of Breath.        Albuterol Sulfate (Nebu Soln) PROVENTIL 2.5mg/3ml         Azelastine HCl (Solution) Azelastine HCl 0.15 % Spray 1-2 Sprays in nose 2 times a day as needed.        DiphenhydrAMINE HCl (Cap) BENADRYL 50 MG Take 50 mg by mouth at bedtime as needed.        Escitalopram Oxalate (Tab) LEXAPRO 20 MG         Escitalopram Oxalate (Tab) LEXAPRO 10 MG         Fluticasone Propionate (Suspension) FLONASE 50 MCG/ACT Spray 1 Spray in nose every morning. Each Nostril        Fluticasone-Salmeterol (AEROSOL POWDER, BREATH ACTIVATED) ADVAIR 500-50 MCG/DOSE Inhale 1 Puff by mouth every 12 hours.        Gabapentin (Cap) NEURONTIN 300 MG Take 1 Cap by mouth 3 times a day.        Guaifenesin-Codeine (Solution) ROBITUSSIN -10 mg/5mL Take 5-10 mL by mouth every 6 hours as needed for Cough.        Ibuprofen (Tab) MOTRIN 800 MG Take 800 mg by mouth every 8 hours as needed.        LevoFLOXacin (Tab) LEVAQUIN 500 MG Take 1 Tab by mouth every day.        Losartan Potassium (Tab) COZAAR 100 MG Take 100 mg by mouth every day.        Losartan Potassium-HCTZ (Tab) HYZAAR 100-25 MG Take 1 Tab by mouth every day.        Montelukast Sodium (Tab) SINGULAIR 10 MG Take 10 mg by mouth every morning.        Multiple Vitamins-Minerals   Take 1 Cap by mouth every day.        Nystatin (Suspension) MYCOSTATIN 705374 UNIT/ML Take 100,000 Units by mouth 5 Times a Day.        Omeprazole (CAPSULE DELAYED RELEASE) PRILOSEC 20 MG Take 20 mg by mouth 2 Times a Day.        PredniSONE (Tab) DELTASONE 20 MG         Sertraline HCl (Tab) ZOLOFT 100 MG Take 100 mg by mouth every morning.        TraZODone HCl (Tab) DESYREL 50 MG Take 50 mg by mouth  every evening.        .                 Medicines prescribed today were sent to:     [facility] [address] [facility] [place] [address] [facility]    [address] [facility] [place] [address]    Phone: [phone] Fax: [phone]    Open 24 Hours?: No      Medication refill instructions:       If your prescription bottle indicates you have medication refills left, it is not necessary to call your provider’s office. Please contact your pharmacy and they will refill your medication.    If your prescription bottle indicates you do not have any refills left, you may request refills at any time through one of the following ways: The online School Admissions system (except Urgent Care), by calling your provider’s office, or by asking your pharmacy to contact your provider’s office with a refill request. Medication refills are processed only during regular business hours and may not be available until the next business day. Your provider may request additional information or to have a follow-up visit with you prior to refilling your medication.   *Please Note: Medication refills are assigned a new Rx number when refilled electronically. Your pharmacy may indicate that no refills were authorized even though a new prescription for the same medication is available at the pharmacy. Please request the medicine by name with the pharmacy before contacting your provider for a refill.           [employer] Admissions Access Code: Activation code not generated  Current School Admissions Status: Active

## 2017-04-05 NOTE — PATIENT INSTRUCTIONS
1. Increase CPAP to 16 cm.  2. Clean equipment weekly and replace supplies as allowed by insurance.  3. Patient encouraged to participate in daily exercise.  4. Continue Advair 500/50 µg twice daily, Singulair daily, and albuterol HFA as needed.  5. Follow up in approximately 2 months with RODO Pearl.

## 2017-04-17 DIAGNOSIS — Z01.810 PRE-OPERATIVE CARDIOVASCULAR EXAMINATION: ICD-10-CM

## 2017-04-17 DIAGNOSIS — Z01.812 PRE-OPERATIVE LABORATORY EXAMINATION: ICD-10-CM

## 2017-04-17 LAB
ANION GAP SERPL CALC-SCNC: 5 MMOL/L (ref 0–11.9)
APPEARANCE UR: CLEAR
BILIRUB UR QL STRIP.AUTO: NEGATIVE
BUN SERPL-MCNC: 15 MG/DL (ref 8–22)
CALCIUM SERPL-MCNC: 9 MG/DL (ref 8.5–10.5)
CHLORIDE SERPL-SCNC: 101 MMOL/L (ref 96–112)
CO2 SERPL-SCNC: 27 MMOL/L (ref 20–33)
COLOR UR: YELLOW
CREAT SERPL-MCNC: 0.99 MG/DL (ref 0.5–1.4)
CULTURE IF INDICATED INDCX: YES UA CULTURE
EKG IMPRESSION: NORMAL
EPI CELLS #/AREA URNS HPF: NORMAL /HPF
ERYTHROCYTE [DISTWIDTH] IN BLOOD BY AUTOMATED COUNT: 43.5 FL (ref 35.9–50)
GFR SERPL CREATININE-BSD FRML MDRD: 55 ML/MIN/1.73 M 2
GLUCOSE SERPL-MCNC: 100 MG/DL (ref 65–99)
GLUCOSE UR STRIP.AUTO-MCNC: NEGATIVE MG/DL
HCT VFR BLD AUTO: 41.3 % (ref 37–47)
HGB BLD-MCNC: 13.8 G/DL (ref 12–16)
HYALINE CASTS #/AREA URNS LPF: NORMAL /LPF
KETONES UR STRIP.AUTO-MCNC: NEGATIVE MG/DL
LEUKOCYTE ESTERASE UR QL STRIP.AUTO: ABNORMAL
MCH RBC QN AUTO: 30.2 PG (ref 27–33)
MCHC RBC AUTO-ENTMCNC: 33.4 G/DL (ref 33.6–35)
MCV RBC AUTO: 90.4 FL (ref 81.4–97.8)
MICRO URNS: ABNORMAL
NITRITE UR QL STRIP.AUTO: NEGATIVE
PH UR STRIP.AUTO: 6.5 [PH]
PLATELET # BLD AUTO: 274 K/UL (ref 164–446)
PMV BLD AUTO: 9.9 FL (ref 9–12.9)
POTASSIUM SERPL-SCNC: 3.9 MMOL/L (ref 3.6–5.5)
PROT UR QL STRIP: NEGATIVE MG/DL
RBC # BLD AUTO: 4.57 M/UL (ref 4.2–5.4)
RBC UR QL AUTO: NEGATIVE
SCCMEC + MECA PNL NOSE NAA+PROBE: NEGATIVE
SCCMEC + MECA PNL NOSE NAA+PROBE: NEGATIVE
SODIUM SERPL-SCNC: 133 MMOL/L (ref 135–145)
SP GR UR STRIP.AUTO: 1.01
WBC # BLD AUTO: 8.8 K/UL (ref 4.8–10.8)
WBC #/AREA URNS HPF: NORMAL /HPF

## 2017-04-17 PROCEDURE — 85027 COMPLETE CBC AUTOMATED: CPT

## 2017-04-17 PROCEDURE — 87086 URINE CULTURE/COLONY COUNT: CPT

## 2017-04-17 PROCEDURE — 36415 COLL VENOUS BLD VENIPUNCTURE: CPT

## 2017-04-17 PROCEDURE — 80048 BASIC METABOLIC PNL TOTAL CA: CPT

## 2017-04-17 PROCEDURE — 87641 MR-STAPH DNA AMP PROBE: CPT

## 2017-04-17 PROCEDURE — 87640 STAPH A DNA AMP PROBE: CPT

## 2017-04-17 PROCEDURE — 81001 URINALYSIS AUTO W/SCOPE: CPT

## 2017-04-17 RX ORDER — AMOXICILLIN 500 MG/1
500 CAPSULE ORAL PRN
Status: ON HOLD | COMMUNITY
End: 2017-05-01

## 2017-04-17 NOTE — DISCHARGE PLANNING
I met with NASRA MILLER: 1945, this morning to discuss her upcoming Right Total Knee Arthroplasty scheduled for 2017 with Dr. Rutledge. The patient had a left knee replacement in . Her insurance is [employer]. We discussed, and I provided her with written information on, medical aids which could assist her during recovery. Her home is a single story with a ramp to enter, and no steps within. She has a walk-in shower and a riser for her toilet. Her  will be with her during recovery, and he will drive her to the physical therapy appointments; which are scheduled with [facility]. She has taken the Total Joint Class with [facility].    I answered her questions, and I reminded her to complete the Surgery Preparation Checklist and to bring it with her on the day of surgery.

## 2017-04-19 LAB
BACTERIA UR CULT: NORMAL
SIGNIFICANT IND 70042: NORMAL
SITE SITE: NORMAL
SOURCE SOURCE: NORMAL

## 2017-05-01 ENCOUNTER — HOSPITAL ENCOUNTER (INPATIENT)
Facility: MEDICAL CENTER | Age: 72
LOS: 2 days | DRG: 470 | End: 2017-05-03
Attending: ORTHOPAEDIC SURGERY | Admitting: ORTHOPAEDIC SURGERY
Payer: MEDICARE

## 2017-05-01 ENCOUNTER — APPOINTMENT (OUTPATIENT)
Dept: RADIOLOGY | Facility: MEDICAL CENTER | Age: 72
DRG: 470 | End: 2017-05-01
Attending: NURSE PRACTITIONER
Payer: MEDICARE

## 2017-05-01 PROBLEM — M17.11 RIGHT KNEE DJD: Status: ACTIVE | Noted: 2017-05-01

## 2017-05-01 PROCEDURE — 160022 HCHG BLOCK: Performed by: ORTHOPAEDIC SURGERY

## 2017-05-01 PROCEDURE — 501838 HCHG SUTURE GENERAL: Performed by: ORTHOPAEDIC SURGERY

## 2017-05-01 PROCEDURE — 700111 HCHG RX REV CODE 636 W/ 250 OVERRIDE (IP)

## 2017-05-01 PROCEDURE — 500096 HCHG BONE CEMENT, SIMPLEX ANTIBIOTIC: Performed by: ORTHOPAEDIC SURGERY

## 2017-05-01 PROCEDURE — 501487 HCHG STRYKER TIP: Performed by: ORTHOPAEDIC SURGERY

## 2017-05-01 PROCEDURE — A9270 NON-COVERED ITEM OR SERVICE: HCPCS

## 2017-05-01 PROCEDURE — A9270 NON-COVERED ITEM OR SERVICE: HCPCS | Performed by: NURSE PRACTITIONER

## 2017-05-01 PROCEDURE — 500094 HCHG BONE CEMENT MIXER (MIX-E-VAC II): Performed by: ORTHOPAEDIC SURGERY

## 2017-05-01 PROCEDURE — 700101 HCHG RX REV CODE 250

## 2017-05-01 PROCEDURE — 700105 HCHG RX REV CODE 258: Performed by: ORTHOPAEDIC SURGERY

## 2017-05-01 PROCEDURE — 160029 HCHG SURGERY MINUTES - 1ST 30 MINS LEVEL 4: Performed by: ORTHOPAEDIC SURGERY

## 2017-05-01 PROCEDURE — 94760 N-INVAS EAR/PLS OXIMETRY 1: CPT

## 2017-05-01 PROCEDURE — 501480 HCHG STOCKINETTE: Performed by: ORTHOPAEDIC SURGERY

## 2017-05-01 PROCEDURE — 502579 HCHG PACK, TOTAL KNEE: Performed by: ORTHOPAEDIC SURGERY

## 2017-05-01 PROCEDURE — 700102 HCHG RX REV CODE 250 W/ 637 OVERRIDE(OP)

## 2017-05-01 PROCEDURE — 700111 HCHG RX REV CODE 636 W/ 250 OVERRIDE (IP): Performed by: ORTHOPAEDIC SURGERY

## 2017-05-01 PROCEDURE — 700102 HCHG RX REV CODE 250 W/ 637 OVERRIDE(OP): Performed by: NURSE PRACTITIONER

## 2017-05-01 PROCEDURE — 160009 HCHG ANES TIME/MIN: Performed by: ORTHOPAEDIC SURGERY

## 2017-05-01 PROCEDURE — 73560 X-RAY EXAM OF KNEE 1 OR 2: CPT | Mod: RT

## 2017-05-01 PROCEDURE — 700112 HCHG RX REV CODE 229: Performed by: NURSE PRACTITIONER

## 2017-05-01 PROCEDURE — 500054 HCHG BANDAGE, ELASTIC 6: Performed by: ORTHOPAEDIC SURGERY

## 2017-05-01 PROCEDURE — 770006 HCHG ROOM/CARE - MED/SURG/GYN SEMI*

## 2017-05-01 PROCEDURE — 160041 HCHG SURGERY MINUTES - EA ADDL 1 MIN LEVEL 4: Performed by: ORTHOPAEDIC SURGERY

## 2017-05-01 PROCEDURE — 160035 HCHG PACU - 1ST 60 MINS PHASE I: Performed by: ORTHOPAEDIC SURGERY

## 2017-05-01 PROCEDURE — 160036 HCHG PACU - EA ADDL 30 MINS PHASE I: Performed by: ORTHOPAEDIC SURGERY

## 2017-05-01 PROCEDURE — 500088 HCHG BLADE, SAGITTAL: Performed by: ORTHOPAEDIC SURGERY

## 2017-05-01 PROCEDURE — 160002 HCHG RECOVERY MINUTES (STAT): Performed by: ORTHOPAEDIC SURGERY

## 2017-05-01 PROCEDURE — 502000 HCHG MISC OR IMPLANTS RC 0278: Performed by: ORTHOPAEDIC SURGERY

## 2017-05-01 PROCEDURE — 501486 HCHG STRYKER IRRIG SET HC W/TUBING: Performed by: ORTHOPAEDIC SURGERY

## 2017-05-01 PROCEDURE — 0SRC0J9 REPLACEMENT OF RIGHT KNEE JOINT WITH SYNTHETIC SUBSTITUTE, CEMENTED, OPEN APPROACH: ICD-10-PCS | Performed by: ORTHOPAEDIC SURGERY

## 2017-05-01 PROCEDURE — 94660 CPAP INITIATION&MGMT: CPT

## 2017-05-01 PROCEDURE — 160048 HCHG OR STATISTICAL LEVEL 1-5: Performed by: ORTHOPAEDIC SURGERY

## 2017-05-01 PROCEDURE — 700111 HCHG RX REV CODE 636 W/ 250 OVERRIDE (IP): Performed by: NURSE PRACTITIONER

## 2017-05-01 PROCEDURE — 500811 HCHG LENS/HOOD FOR SPACESUIT: Performed by: ORTHOPAEDIC SURGERY

## 2017-05-01 DEVICE — IMPLANT GNS II C/R FEM SIZE 5 RIGHT: Type: IMPLANTABLE DEVICE | Status: FUNCTIONAL

## 2017-05-01 DEVICE — IMPLANTABLE DEVICE: Type: IMPLANTABLE DEVICE | Status: FUNCTIONAL

## 2017-05-01 DEVICE — IMPLANT GNS II CMT TIB SIZE 4 RIGHT: Type: IMPLANTABLE DEVICE | Status: FUNCTIONAL

## 2017-05-01 DEVICE — IMPLANT GII C/R ART INS SZ 3-4 9MM: Type: IMPLANTABLE DEVICE | Status: FUNCTIONAL

## 2017-05-01 DEVICE — BONE CEMENT SIMPLEX ANTIBIO - (10/PK): Type: IMPLANTABLE DEVICE | Status: FUNCTIONAL

## 2017-05-01 DEVICE — PATELLA GNS II RESURF  29MM: Type: IMPLANTABLE DEVICE | Status: FUNCTIONAL

## 2017-05-01 RX ORDER — MONTELUKAST SODIUM 10 MG/1
10 TABLET ORAL EVERY MORNING
Status: DISCONTINUED | OUTPATIENT
Start: 2017-05-01 | End: 2017-05-03 | Stop reason: HOSPADM

## 2017-05-01 RX ORDER — ALBUTEROL SULFATE 2.5 MG/3ML
2.5 SOLUTION RESPIRATORY (INHALATION)
Status: DISCONTINUED | OUTPATIENT
Start: 2017-05-01 | End: 2017-05-01

## 2017-05-01 RX ORDER — SCOLOPAMINE TRANSDERMAL SYSTEM 1 MG/1
1 PATCH, EXTENDED RELEASE TRANSDERMAL
Status: DISCONTINUED | OUTPATIENT
Start: 2017-05-01 | End: 2017-05-03 | Stop reason: HOSPADM

## 2017-05-01 RX ORDER — TRAMADOL HYDROCHLORIDE 50 MG/1
50 TABLET ORAL EVERY 6 HOURS PRN
Status: ON HOLD | COMMUNITY
End: 2017-05-03

## 2017-05-01 RX ORDER — LIDOCAINE HYDROCHLORIDE 10 MG/ML
INJECTION, SOLUTION INFILTRATION; PERINEURAL
Status: COMPLETED
Start: 2017-05-01 | End: 2017-05-01

## 2017-05-01 RX ORDER — GABAPENTIN 300 MG/1
300 CAPSULE ORAL 3 TIMES DAILY
Status: DISCONTINUED | OUTPATIENT
Start: 2017-05-01 | End: 2017-05-03 | Stop reason: HOSPADM

## 2017-05-01 RX ORDER — HYDROCODONE BITARTRATE AND ACETAMINOPHEN 5; 325 MG/1; MG/1
1-2 TABLET ORAL EVERY 6 HOURS PRN
Status: DISCONTINUED | OUTPATIENT
Start: 2017-05-01 | End: 2017-05-03 | Stop reason: HOSPADM

## 2017-05-01 RX ORDER — OXYCODONE HYDROCHLORIDE 5 MG/1
5-10 TABLET ORAL EVERY 4 HOURS PRN
Status: DISCONTINUED | OUTPATIENT
Start: 2017-05-01 | End: 2017-05-01

## 2017-05-01 RX ORDER — ONDANSETRON 2 MG/ML
4 INJECTION INTRAMUSCULAR; INTRAVENOUS EVERY 4 HOURS PRN
Status: DISCONTINUED | OUTPATIENT
Start: 2017-05-01 | End: 2017-05-03 | Stop reason: HOSPADM

## 2017-05-01 RX ORDER — DEXAMETHASONE SODIUM PHOSPHATE 4 MG/ML
4 INJECTION, SOLUTION INTRA-ARTICULAR; INTRALESIONAL; INTRAMUSCULAR; INTRAVENOUS; SOFT TISSUE
Status: DISCONTINUED | OUTPATIENT
Start: 2017-05-01 | End: 2017-05-03 | Stop reason: HOSPADM

## 2017-05-01 RX ORDER — ACETAMINOPHEN 500 MG
TABLET ORAL
Status: COMPLETED
Start: 2017-05-01 | End: 2017-05-01

## 2017-05-01 RX ORDER — OXYCODONE HCL 5 MG/5 ML
SOLUTION, ORAL ORAL
Status: COMPLETED
Start: 2017-05-01 | End: 2017-05-01

## 2017-05-01 RX ORDER — TRAMADOL HYDROCHLORIDE 50 MG/1
100 TABLET ORAL EVERY 6 HOURS PRN
Status: DISCONTINUED | OUTPATIENT
Start: 2017-05-01 | End: 2017-05-03 | Stop reason: HOSPADM

## 2017-05-01 RX ORDER — LEVOFLOXACIN 500 MG/1
500 TABLET, FILM COATED ORAL DAILY
Status: DISCONTINUED | OUTPATIENT
Start: 2017-05-01 | End: 2017-05-01

## 2017-05-01 RX ORDER — BUPIVACAINE HYDROCHLORIDE AND EPINEPHRINE 5; 5 MG/ML; UG/ML
INJECTION, SOLUTION EPIDURAL; INTRACAUDAL; PERINEURAL
Status: DISCONTINUED | OUTPATIENT
Start: 2017-05-01 | End: 2017-05-01 | Stop reason: HOSPADM

## 2017-05-01 RX ORDER — OMEPRAZOLE 20 MG/1
20 CAPSULE, DELAYED RELEASE ORAL 2 TIMES DAILY
Status: DISCONTINUED | OUTPATIENT
Start: 2017-05-01 | End: 2017-05-03 | Stop reason: HOSPADM

## 2017-05-01 RX ORDER — TRAMADOL HYDROCHLORIDE 50 MG/1
50-100 TABLET ORAL EVERY 6 HOURS PRN
Status: DISCONTINUED | OUTPATIENT
Start: 2017-05-01 | End: 2017-05-01

## 2017-05-01 RX ORDER — FLUTICASONE PROPIONATE 50 MCG
1 SPRAY, SUSPENSION (ML) NASAL EVERY MORNING
Status: DISCONTINUED | OUTPATIENT
Start: 2017-05-01 | End: 2017-05-03 | Stop reason: HOSPADM

## 2017-05-01 RX ORDER — OXYCODONE HYDROCHLORIDE 10 MG/1
10 TABLET ORAL
Status: DISCONTINUED | OUTPATIENT
Start: 2017-05-01 | End: 2017-05-03 | Stop reason: HOSPADM

## 2017-05-01 RX ORDER — AMOXICILLIN 250 MG
1 CAPSULE ORAL
Status: DISCONTINUED | OUTPATIENT
Start: 2017-05-01 | End: 2017-05-03 | Stop reason: HOSPADM

## 2017-05-01 RX ORDER — BISACODYL 10 MG
10 SUPPOSITORY, RECTAL RECTAL
Status: DISCONTINUED | OUTPATIENT
Start: 2017-05-01 | End: 2017-05-03 | Stop reason: HOSPADM

## 2017-05-01 RX ORDER — LOSARTAN POTASSIUM 25 MG/1
100 TABLET ORAL DAILY
Status: DISCONTINUED | OUTPATIENT
Start: 2017-05-01 | End: 2017-05-03 | Stop reason: HOSPADM

## 2017-05-01 RX ORDER — TRANEXAMIC ACID 100 MG/ML
INJECTION, SOLUTION INTRAVENOUS
Status: DISCONTINUED | OUTPATIENT
Start: 2017-05-01 | End: 2017-05-01 | Stop reason: HOSPADM

## 2017-05-01 RX ORDER — DOCUSATE SODIUM 100 MG/1
100 CAPSULE, LIQUID FILLED ORAL 2 TIMES DAILY
Status: DISCONTINUED | OUTPATIENT
Start: 2017-05-01 | End: 2017-05-03 | Stop reason: HOSPADM

## 2017-05-01 RX ORDER — ALBUTEROL SULFATE 90 UG/1
2 AEROSOL, METERED RESPIRATORY (INHALATION) EVERY 6 HOURS PRN
Status: DISCONTINUED | OUTPATIENT
Start: 2017-05-01 | End: 2017-05-01

## 2017-05-01 RX ORDER — OXYCODONE HYDROCHLORIDE 5 MG/1
5 TABLET ORAL EVERY 4 HOURS PRN
Status: DISCONTINUED | OUTPATIENT
Start: 2017-05-01 | End: 2017-05-03 | Stop reason: HOSPADM

## 2017-05-01 RX ORDER — AMOXICILLIN 250 MG
1 CAPSULE ORAL NIGHTLY
Status: DISCONTINUED | OUTPATIENT
Start: 2017-05-01 | End: 2017-05-03 | Stop reason: HOSPADM

## 2017-05-01 RX ORDER — TRAZODONE HYDROCHLORIDE 50 MG/1
50 TABLET ORAL NIGHTLY
Status: DISCONTINUED | OUTPATIENT
Start: 2017-05-01 | End: 2017-05-03 | Stop reason: HOSPADM

## 2017-05-01 RX ORDER — POLYETHYLENE GLYCOL 3350 17 G/17G
1 POWDER, FOR SOLUTION ORAL DAILY
Status: DISCONTINUED | OUTPATIENT
Start: 2017-05-01 | End: 2017-05-03 | Stop reason: HOSPADM

## 2017-05-01 RX ORDER — SODIUM CHLORIDE, SODIUM LACTATE, POTASSIUM CHLORIDE, CALCIUM CHLORIDE 600; 310; 30; 20 MG/100ML; MG/100ML; MG/100ML; MG/100ML
INJECTION, SOLUTION INTRAVENOUS
Status: DISCONTINUED | OUTPATIENT
Start: 2017-05-01 | End: 2017-05-02

## 2017-05-01 RX ORDER — DIPHENHYDRAMINE HYDROCHLORIDE 50 MG/ML
25 INJECTION INTRAMUSCULAR; INTRAVENOUS EVERY 6 HOURS PRN
Status: DISCONTINUED | OUTPATIENT
Start: 2017-05-01 | End: 2017-05-03 | Stop reason: HOSPADM

## 2017-05-01 RX ORDER — ENEMA 19; 7 G/133ML; G/133ML
1 ENEMA RECTAL
Status: DISCONTINUED | OUTPATIENT
Start: 2017-05-01 | End: 2017-05-03 | Stop reason: HOSPADM

## 2017-05-01 RX ORDER — TRAMADOL HYDROCHLORIDE 50 MG/1
50 TABLET ORAL EVERY 6 HOURS PRN
Status: DISCONTINUED | OUTPATIENT
Start: 2017-05-01 | End: 2017-05-03 | Stop reason: HOSPADM

## 2017-05-01 RX ORDER — SODIUM CHLORIDE 9 MG/ML
INJECTION, SOLUTION INTRAVENOUS CONTINUOUS
Status: DISCONTINUED | OUTPATIENT
Start: 2017-05-01 | End: 2017-05-03 | Stop reason: HOSPADM

## 2017-05-01 RX ORDER — BUDESONIDE AND FORMOTEROL FUMARATE DIHYDRATE 160; 4.5 UG/1; UG/1
2 AEROSOL RESPIRATORY (INHALATION) EVERY 12 HOURS
Status: DISCONTINUED | OUTPATIENT
Start: 2017-05-01 | End: 2017-05-01

## 2017-05-01 RX ADMIN — DIPHENHYDRAMINE HYDROCHLORIDE 25 MG: 50 INJECTION, SOLUTION INTRAMUSCULAR; INTRAVENOUS at 20:16

## 2017-05-01 RX ADMIN — SODIUM CHLORIDE: 9 INJECTION, SOLUTION INTRAVENOUS at 12:40

## 2017-05-01 RX ADMIN — LIDOCAINE HYDROCHLORIDE 0.2 ML: 10 INJECTION, SOLUTION INFILTRATION; PERINEURAL at 09:40

## 2017-05-01 RX ADMIN — VANCOMYCIN HYDROCHLORIDE 1700 MG: 10 INJECTION, POWDER, LYOPHILIZED, FOR SOLUTION INTRAVENOUS at 22:31

## 2017-05-01 RX ADMIN — FENTANYL CITRATE 25 MCG: 50 INJECTION, SOLUTION INTRAMUSCULAR; INTRAVENOUS at 11:45

## 2017-05-01 RX ADMIN — GABAPENTIN 300 MG: 300 CAPSULE ORAL at 16:05

## 2017-05-01 RX ADMIN — TRAZODONE HYDROCHLORIDE 50 MG: 50 TABLET ORAL at 20:13

## 2017-05-01 RX ADMIN — SENNOSIDES AND DOCUSATE SODIUM 1 TABLET: 8.6; 5 TABLET ORAL at 20:13

## 2017-05-01 RX ADMIN — SODIUM CHLORIDE, SODIUM LACTATE, POTASSIUM CHLORIDE, CALCIUM CHLORIDE: 600; 310; 30; 20 INJECTION, SOLUTION INTRAVENOUS at 09:40

## 2017-05-01 RX ADMIN — DOCUSATE SODIUM 100 MG: 100 CAPSULE, LIQUID FILLED ORAL at 20:13

## 2017-05-01 RX ADMIN — FENTANYL CITRATE 50 MCG: 50 INJECTION, SOLUTION INTRAMUSCULAR; INTRAVENOUS at 12:12

## 2017-05-01 RX ADMIN — OXYCODONE HYDROCHLORIDE 10 MG: 5 SOLUTION ORAL at 11:37

## 2017-05-01 RX ADMIN — OMEPRAZOLE 20 MG: 20 CAPSULE, DELAYED RELEASE ORAL at 20:13

## 2017-05-01 RX ADMIN — FENTANYL CITRATE 25 MCG: 50 INJECTION, SOLUTION INTRAMUSCULAR; INTRAVENOUS at 12:26

## 2017-05-01 RX ADMIN — OXYCODONE HYDROCHLORIDE 5 MG: 5 TABLET ORAL at 23:55

## 2017-05-01 RX ADMIN — OXYCODONE HYDROCHLORIDE 5 MG: 5 TABLET ORAL at 18:40

## 2017-05-01 RX ADMIN — VANCOMYCIN HYDROCHLORIDE 1.5 G: 1 INJECTION, POWDER, LYOPHILIZED, FOR SOLUTION INTRAVENOUS at 09:40

## 2017-05-01 RX ADMIN — GABAPENTIN 300 MG: 300 CAPSULE ORAL at 20:13

## 2017-05-01 RX ADMIN — ACETAMINOPHEN 1000 MG: 500 TABLET, COATED ORAL at 09:32

## 2017-05-01 ASSESSMENT — PAIN SCALES - GENERAL
PAINLEVEL_OUTOF10: 2
PAINLEVEL_OUTOF10: 4
PAINLEVEL_OUTOF10: 7
PAINLEVEL_OUTOF10: 5
PAINLEVEL_OUTOF10: 4
PAINLEVEL_OUTOF10: 3
PAINLEVEL_OUTOF10: 4
PAINLEVEL_OUTOF10: 1
PAINLEVEL_OUTOF10: 4
PAINLEVEL_OUTOF10: 2
PAINLEVEL_OUTOF10: 5

## 2017-05-01 ASSESSMENT — LIFESTYLE VARIABLES
DO YOU DRINK ALCOHOL: NO
EVER_SMOKED: YES

## 2017-05-01 NOTE — IP AVS SNAPSHOT
" <p align=\"LEFT\"><IMG SRC=\"//[email or web address]$/[email or web address]\" alt=\"Image\" WIDTH=\"50%\" HEIGHT=\"200\" BORDER=\"\"></p>                   Name:Cyndy Petit  Medical Record Number:[id number]  CSN: [id number]    YOB: 1945   Age: 71 y.o.  Sex: female  HT:1.572 m (5' 1.89\") WT: 110 kg (242 lb 8.1 oz)          Admit Date: 5/1/2017     Discharge Date:   Today's Date: 5/3/2017  Attending Doctor:  Jesús Rutledge M.D.                  Allergies:  Allergy; Cefepime; Cephalosporins; Clarithromycin; Cleocin; Meropenem; Morphine; and Sulfamethoxazole-trimethoprim          Your appointments     Arturo 15, 2017 11:20 AM   Established Patient Pul with JACK Gilbert   [facility] (--)    [address] [facility] [address]  [facility] [address]   [phone]                 Medication List      Take these Medications        Instructions    Aspirin 325 MG Tbec    Take 1 Tab by mouth 2 times a day for 15 days.   Dose:  325 mg       diphenhydrAMINE 50 MG Caps   Commonly known as:  BENADRYL    Take 50 mg by mouth at bedtime as needed.   Dose:  50 mg       escitalopram 20 MG tablet   Commonly known as:  LEXAPRO        fluticasone 50 MCG/ACT nasal spray   Commonly known as:  FLONASE    Spray 1 Spray in nose every morning. Each Nostril   Dose:  1 Spray       fluticasone-salmeterol 500-50 MCG/DOSE Aepb   Commonly known as:  ADVAIR    Inhale 1 Puff by mouth every 12 hours.   Dose:  1 Puff       gabapentin 300 MG Caps   Commonly known as:  NEURONTIN    Take 1 Cap by mouth 3 times a day.   Dose:  300 mg       hydrocodone-acetaminophen 5-325 MG Tabs per tablet   Commonly known as:  NORCO    Take 1-2 Tabs by mouth every 6 hours as needed (As needed for breakthrough pain. Max 8 tabs daily. Wean off pain medications as tolerated. Do not drive while taking. No ETOH.).   Dose:  1-2 Tab       losartan 100 MG Tabs   Commonly known as:  COZAAR    Take 100 mg by mouth every day.   Dose:  100 mg       meloxicam 15 MG " tablet   Commonly known as:  MOBIC    Take 1 Tab by mouth every day.   Dose:  15 mg       montelukast 10 MG Tabs   Commonly known as:  SINGULAIR    Take 10 mg by mouth every morning.   Dose:  10 mg       nystatin 511134 UNIT/ML Susp   Commonly known as:  MYCOSTATIN    Take 100,000 Units by mouth 5 Times a Day.   Dose:  308027 Units       nystatin/triamcinolone 603832-4.1 UNIT/GM-% Crea   Commonly known as:  MYCOLOG    Apply  to affected area(s) 4 times a day.       omeprazole 20 MG delayed-release capsule   Commonly known as:  PRILOSEC    Take 20 mg by mouth 2 Times a Day.   Dose:  20 mg       * albuterol 2.5mg/3ml Nebu solution for nebulization   Commonly known as:  PROVENTIL        * PROAIR  (90 BASE) MCG/ACT Aers inhalation aerosol   Generic drug:  albuterol    Inhale 2 Puffs by mouth every 6 hours as needed. Shortness of breath  Indications: Asthma   Dose:  2 Puff       STOOL SOFTENER PO    Take  by mouth.       tramadol 50 MG Tabs   What changed:    - how much to take  - reasons to take this   Commonly known as:  ULTRAM    Take 1-2 Tabs by mouth every 6 hours as needed for Mild Pain.   Dose:   mg       trazodone 50 MG Tabs   Commonly known as:  DESYREL    Take 50 mg by mouth every evening.   Dose:  50 mg       * Notice:  This list has 2 medication(s) that are the same as other medications prescribed for you. Read the directions carefully, and ask your doctor or other care provider to review them with you.

## 2017-05-01 NOTE — FLOWSHEET NOTE
05/01/17 1317   Interdisciplinary Plan of Care-Goals (Indications)   Obstructive Ventilatory Defect or Pulmonary Disease without Obvious Obstruction History / Diagnosis   Hyperinflation Protocol Indications Pre or Post-op Abdominal, Thoracic or Orthopedic Surgery   Interdisciplinary Plan of Care-Outcomes    Hyperinflation Protocol Goals/Outcome Greater Than 60% of Predicted I.S. Volume x 24 hrs   Education   Education Yes - Pt. / Family has been Instructed in use of Respiratory Equipment   Incentive Spirometry Group   Incentive Spirometry Instruction Yes   Breathing Exercises Yes   Incentive Spirometer Volume 2000 mL   Incentive Spirometer Date Last Changed 05/01/17   Incentive Spirometer Next Change Date (Q 30 Days) 06/01/17   Respiratory WDL   Respiratory (WDL) X   Chest Exam   Respiration 16   Heart Rate (Monitored) 80   Breath Sounds   RML Breath Sounds Diminished   RLL Breath Sounds Diminished   LLL Breath Sounds Diminished   Oximetry   #Pulse Oximetry (Single Determination) Yes   Continuous Oximetry Yes   Oxygen   Home O2 Use Prior To Admission? No   Pulse Oximetry 97 %   O2 (LPM) 2   O2 Daily Delivery Respiratory  Nasal Cannula

## 2017-05-01 NOTE — IP AVS SNAPSHOT
[employer] [facility] Access Code: Activation code not generated  Current [employer] [facility] Status: Active    [employer]  A secure, online tool to manage your health information     [employer]’s [facility]® is a secure, online tool that connects you to your personalized health information from the privacy of your home -- day or night - making it very easy for you to manage your healthcare. Once the activation process is completed, you can even access your medical information using the [employer] [facility] abby, which is available for free in the Apple Abby store or Google Play store.     [employer] provides the following levels of access (as shown below):   My Chart Features   [facility] Primary Care Doctor [facility]  Specialists [facility]  Urgent  Care Non-[facility]  Primary Care  Doctor   Email your healthcare team securely and privately 24/7 X X X X   Manage appointments: schedule your next appointment; view details of past/upcoming appointments X      Request prescription refills. X      View recent personal medical records, including lab and immunizations X X X X   View health record, including health history, allergies, medications X X X X   Read reports about your outpatient visits, procedures, consult and ER notes X X X X   See your discharge summary, which is a recap of your hospital and/or ER visit that includes your diagnosis, lab results, and care plan. X X       How to register for [employer] [facility]:  1. Go to  [email or web address].  2. Click on the Sign Up Now box, which takes you to the New Member Sign Up page. You will need to provide the following information:  a. Enter your [employer] Access Code exactly as it appears at the top of this page. (You will not need to use this code after you’ve completed the sign-up process. If you do not sign up before the expiration date, you must request a new code.)   b. Enter your date of birth.   c. Enter your home email address.   d. Click Submit, and follow the next screen’s instructions.  3. Create a [employer] [facility] ID. This will  be your doUdeal login ID and cannot be changed, so think of one that is secure and easy to remember.  4. Create a [employer] password. You can change your password at any time.  5. Enter your Password Reset Question and Answer. This can be used at a later time if you forget your password.   6. Enter your e-mail address. This allows you to receive e-mail notifications when new information is available in doUdeal.  7. Click Sign Up. You can now view your health information.    For assistance activating your [employer] account, call [phone]

## 2017-05-01 NOTE — OR NURSING
0915 PT TO PRE OP TO ASSUME CARE.    1017 Patient allergies and NPO status verified, home medication reconciliation completed and belongings secured. Patient verbalizes understanding of pain scale, expected course of stay and plan of care. Surgical site verified with patient. IV access established. Sequentials placed on legs

## 2017-05-01 NOTE — PROGRESS NOTES
Ambulates to bathroom with walker to void-tolerated activity well.   Pt reports she has had another contraction.  Dr. Delarosa notified.     Alta Haile, RN  04/09/17 8485

## 2017-05-01 NOTE — PROGRESS NOTES
Report received, patient to room, polar ice administered while in bed, up with 2 to bathroom, c/o nausea while ambulating and vomited once. Deep breathing encouraged, IS performed and encouraged

## 2017-05-01 NOTE — OR NURSING
1130- Report received from Alissa LAWSON. 1+ pedal pulse R foot.  CMS intact R foot.  R knee ace wrap dressing CDI.  1140- VSS. Pt c/o pain R knee.  See mar for meds given.  1145- R hand PIV infiltrating, will start new IV.  1155- no change  1210-No change. Working on pain control.  1225- Pt states pain is better 5/10 now.  Still states not tolerable.  See MAR.  1240-Report to Krissy LAWSON on [facility]. Pt states pain tolerable, rates at 3/10  1247- Pt to [facility].

## 2017-05-01 NOTE — IP AVS SNAPSHOT
5/3/2017    Cyndy Petit  [address] [place] [address]    Dear Cyndy:    [facility] wants to ensure your discharge home is safe and you or your loved ones have had all of your questions answered regarding your care after you leave the hospital.    Below is a list of resources and contact information should you have any questions regarding your hospital stay, follow-up instructions, or active medical symptoms.    Questions or Concerns Regarding… Contact   Medical Questions Related to Your Discharge  (7 days a week, 8am-5pm) Contact a Nurse Care Coordinator   [phone]   Medical Questions Not Related to Your Discharge  (24 hours a day / 7 days a week)  Contact the Nurse Health Line   [phone]    Medications or Discharge Instructions Refer to your discharge packet   or contact your [facility] Primary Care Provider   [phone]   Follow-up Appointment(s) Schedule your appointment via [employer]   or contact Scheduling [phone]   Billing Review your statement via [employer]  or contact Billing [phone]   Medical Records Review your records via [employer]   or contact Medical Records [phone]     You may receive a telephone call within two days of discharge. This call is to make certain you understand your discharge instructions and have the opportunity to have any questions answered. You can also easily access your medical information, test results and upcoming appointments via the [employer] free online health management tool. You can learn more and sign up at [employer]. For assistance setting up your [employer] account, please call [phone].    Once again, we want to ensure your discharge home is safe and that you have a clear understanding of any next steps in your care. If you have any questions or concerns, please do not hesitate to contact us, we are here for you. Thank you for choosing [facility] for your healthcare needs.    Sincerely,    Your [facility] Healthcare Team

## 2017-05-01 NOTE — OP REPORT
DATE OF SERVICE: 5/1/17    PREOPERATIVE DIAGNOSES:  1. right knee advanced tricompartmental arthritis.     POSTOPERATIVE DIAGNOSES:  1. right knee advanced tricompartmental arthritis.     PROCEDURE PERFORMED: right total knee arthroplasty    SURGEON: Jesús Rutledge MD.     ASSISTANT: ARELIS Fernandez    ANESTHESIA: General with Adductor canal femoral nerve block for postoperative pain control.     ANESTHESIOLOGIST: Temi    ANTIBIOTICS: Ancef and Vancomycin.     IMPLANTS: [employer] Kinza II system, size 5 femur, 4 tibial baseplate, 9 poly, and 32 patellar button with 1 bag of antibiotic Simplex cement.     COMPLICATIONS: None    BLOOD LOSS: minimal    INDICATIONS: The patient presents with a chief complaint of knee pain recalcitrant to conservative treatment. The patient has advanced knee arthritis. The risks of the surgery were discussed at length. All questions were answered, and no guarantees given. The patient elected to proceed with the proposed procedure.     DESCRIPTION OF PROCEDURE: The patient was properly identified in the preoperative holding room and the right knee was marked as the correct surgical site. The patient was taken to the operative suite and placed in supine on the operative table. The patient underwent general anesthesia. After review of allergies, antibiotics were administered, a time out was called. The right knee and lower extremity was sterilely prepped and draped in standard fashion. The limb was exsanguinated and tourniquet was inflated to 250mmHG. A standard midline incision was made followed by medial patellar arthrotomy. The medial and lateral meniscus were removed as well as the ACL. The PCL was protected. There was significant tricompartmental arthritis. The knee was placed in 90 degrees of flexion. A drill hole was made on the distal femur. Anterior referencing measured size 5. The anterior cut followed by a distal cut, followed by a 4-in-1 cutting block followed  by extramedullary tibial guide after checking appropriate rotations, slope and height. The tibia was drilled and pinned in place. The proximal tibia cut was performed. Appropriate soft tissue balance at 0 - 30 degrees. A size 4  tibial base had good coverage without overhang, was repaired and trialed, brought in extension with a 9 poly liner. The patella was measured with a caliper, a saw cut was made, drilled for a 32 button. This struck well. The femoral punch and tibial punch was used. The trial implants were removed. The implants were cemented in place, first the tibia, then the femur, brought in extension with # 9 poly and the the patella. This was soaked with betadine and saline and once the cement was thoroughly hard, the tourniquet was deflated. Good hemostasis was obtained. Once again, retrialed and the final 9 CR poly was used, irrigated and then closed in flexion with with # 2 Quill, reinforced with # 1 Vicryl, 2-0 Vicryl and staples on skin. All counts were correct. ARELIS Fernandez, was present throughout the operation and key essential part of the success of the operation assisting with patient positioning, instrumentation, retraction, and closure.

## 2017-05-01 NOTE — IP AVS SNAPSHOT
" Home Care Instructions                                                                                                                  Name:Cyndy Petit  Medical Record Number:[id number]  CSN: [id number]    YOB: 1945   Age: 71 y.o.  Sex: female  HT:1.572 m (5' 1.89\") WT: 110 kg (242 lb 8.1 oz)          Admit Date: 5/1/2017     Discharge Date:   Today's Date: 5/3/2017  Attending Doctor:  Jesús Rutledge M.D.                  Allergies:  Allergy; Cefepime; Cephalosporins; Clarithromycin; Cleocin; Meropenem; Morphine; and Sulfamethoxazole-trimethoprim            Discharge Instructions       Ice. Elevate. Call for incision redness, heat, pus drainage, edema, or fever, chills, nausea, vomiting, diarrhea. Take daily stool softeners or laxatives prn as narcotic pain medications cause constipation.  Ok to shower but keep incision dry and covered. Keep dressings in place for 5 days, change as needed for drainage. Keep incision covered with clean dressing until staples/stitches are removed. There is no need to place any ointment over the incision.  F/U with Dr. Rutledge in 7-10 days as scheduled. Weight Bearing as tolerated. START Physical Therapy This Week. Place Krishna Hose Bilateral lower extremities. You may remove them for one hour daily and for therapy; otherwise keep on until seen by Dr. Rutledge.   Discharge Instructions    Discharged to home by car with relative. Discharged via wheelchair, hospital escort: Yes.  Special equipment needed: Walker    Be sure to schedule a follow-up appointment with your primary care doctor or any specialists as instructed.     Discharge Plan:   Diet Plan: Discussed  Activity Level: Discussed  Confirmed Follow up Appointment: Patient to Call and Schedule Appointment  Confirmed Symptoms Management: Discussed  Medication Reconciliation Updated: Yes  Influenza Vaccine Indication: Not indicated: Previously immunized this influenza season and > 8 years of age    I understand " that a diet low in cholesterol, fat, and sodium is recommended for good health. Unless I have been given specific instructions below for another diet, I accept this instruction as my diet prescription.   Other diet: Regular    Special Instructions: Discharge instructions for the Orthopedic Patient    Follow up with Primary Care Physician within 2 weeks of discharge to home, regarding:  Review of medications and diagnostic testing.  Surveillance for medical complications.  Workup and treatment of osteoporosis, if appropriate.     -Is this a Joint Replacement patient? Yes Total Joint Knee Replacement Discharge Instructions    Pain  - The goal is to slowly wean off the prescription pain medicine.  - Ice can be used for pain control.  20 minutes at a time is recommended, and never directly against your skin or incision.  - Most patients are off the pain pills by 3 weeks; others may require a low level of pain medications for many months.  If your pain continues to be severe, follow up with your physician.  Infection    Knee joint infections; occur in fewer than 2% of patients. The most common causes of infection following total knee replacement surgery are from bacteria that enter the bloodstream during dental procedures, urinary tract infections, or skin infections. These bacteria can lodge around your knee replacement and cause an infection.  - Keep the incision as clean and dry as possible.  - Always wash your hands before touching your incision.  - Skin infections tend to develop around 7-10 days after surgery; most can be treated with oral antibiotics.  - Dental Care should be delayed for 3 months after surgery, your surgeon recommends taking a dose of antibiotics 1 hour prior to any dental procedure. After 2 years, most surgeons recommend antibiotics only before an extensive procedure.  Ask your surgeon what he recommends.  - Signs and symptoms of infection can include:  low grade fever, redness, pain, swelling  and drainage from your incision.  Notify your surgeon immediately if you develop any of these symptoms.  Other instructions  - Bowel habits - constipation is extremely common and is caused by a combination of anesthesia, lack of mobility and pain medicine.  Use stool softeners or laxatives if necessary. It is important not to ignore this problem, as bowel obstructions can be a serious complication after joint replacement surgery.  - Mood/Energy Level - Many patients experience a lack of energy and endurance for up to 2-3 months after surgery.  Some may also feel down and can even become depressed.  This is likely due to the postoperative anemia, change in activity level, lack of sleep, pain medicine and just the emotional reaction to the surgery itself that is a big disruption in a person’s life.  This usually passes.  If symptoms persist, follow up with your primary physician.  - Returning to work - Your surgeon will give you more specific instructions. Depending on the type of activities you perform, it may be 6 to 8 weeks before you return to work.   Generally, if you work a sedentary job requiring little standing or walking, most patients may return within 2-6 weeks.  Manual labor jobs involving walking, lifting and standing may take longer. Your surgeon’s office can provide a release to part-time or light duty work early on in your recovery and progress you to full duty as able.    - Driving - If your left knee was replaced and you have an automatic transmission, you may be able to begin driving in a week or so, provided you are no longer taking narcotic pain medication. If your right knee was replaced, avoid driving for 6 to 8 weeks. Remember that your reflexes may not be as sharp as before your surgery. Ask your surgeon for specific instructions.   - Avoiding falls - A fall during the first few weeks after surgery can damage your new knee and may result in a need for further surgery.   throw rugs and  tack down loose carpeting.  Be aware of floor hazards such as pets, small objects or uneven surfaces.    - [employer] Metal Detectors - The sensitivity of metal detectors varies and it is likely that your prosthesis will cause an alarm.  Inform the  of your artificial joint.  Diet  - Resume your normal diet as tolerated.  - It is important to achieve a healthy nutritional status by eating a well balanced diet on a regular basis.  - Your physician may recommend that you take iron and vitamin supplements.   - Continue to drink plenty of fluids.  Shower/Bathing  - You may shower as soon as you get home from the hospital unless otherwise instructed.  - Keep your incision out of water.  To keep the incision dry when showering, cover it with a plastic bag or plastic wrap.  - Pat incision dry if it gets wet.  Don’t rub.  - Do not submerge in a bath until staples are out and the incision is completely healed. (Approximately 6-8 weeks)  Dressing Change:  Procedure (if recommended by your physician)  - Wash hands.  - Open all new dressing change materials.  - Remove old dressing and discard.  - Inspect incision for redness, increase in clear drainage, yellow/green drainage, odor and surrounding skin hot to touch.  -  ABD (large gauze) pad or “island dressing” by one corner and lay over the incision.  Be careful not to touch the inside of the dressing that will lay over the incision.  - Secure in place as instructed (Ace wrap or tape).    Swelling/Bruising    - Swelling can last from 3-6 months.  - Elevate your leg higher than your heart while reclining.   The first week you are home you should elevate your leg an equal amount of time, as you are active.    - Anti-inflammatory pills can be taken once you have stopped the blood thinners.  - The swelling is usually worse after you go home since you are upright for longer periods of time.  - Bruising is common and can involve the entire leg including the  thigh, calf and even foot. Bruising often does not appear until after you arrive home and it can be quite dramatic- purple, black, and green.  The bruising you can see is not usually concerning and will subside without any treatment.      Blood Clot Prevention  Blood clots in the legs and the less common, but frightening, clots that travel to the lungs are a real focus of our preventative. Most patients are at standard risk for them, but those patients who are at higher risk include people who have had previous clots, a family history of clotting, smoking, diabetes, obesity, advanced age, use of estrogen and a sedentary lifestyle.    - Signs of blood clots in legs - Swelling in thigh, calf or ankle that does not go down with elevation.  Pain, heat and tenderness in calf, back of calf or groin area.  NOTE: blood clots can occur in either leg.  - You have been receiving anticoagulant therapy (blood thinners) in the hospital and you may be instructed to continue at home depending on your risk factors.  - Your risk for developing a clot continues for up to 2-3 months after surgery.  You should avoid prolonged sitting and dehydration during that time (long air trips and car trips).  If you do take a trip during this time, please get up and move around every 1- 1.5 hours.  - If you are prescribed blood-thinning medication for home, follow instructions as directed. (Handouts provided if applicable).      Activity  Once home, you should continue to stay active. The key is to remember not to overdo it! While you can expect some good days and some bad days, you should notice a gradual improvement and a gradual increase in your endurance over the next 6 to 12 months. Exercise is a critical component of home care, particularly during the first few weeks after surgery.     - Normal activities of daily living You should be able to resume most within 3 to 6 weeks following surgery. Some pain with activity and at night is common  for several weeks after surgery  -  Physical Therapy Exercises - Continue to do the exercises prescribed for at least two months after surgery. Riding a stationary bicycle can help maintain muscle tone and keep your knee flexible. Try to achieve the maximum degree of bending and extension possible. (handout provided by Therapist).  - Sexual Activity -. Your surgeon can tell you when it safe to resume sexual activity.    - Sleeping Positions - You can safely sleep on your back, on either side, or on your stomach.   - Other Activities - Walk as much as you like, but remember that walking is no substitute for the exercises your doctor and physical therapist will prescribe. Lower impact activities are preferred.  If you have specific questions, consult your Surgeon.    When to Call the Doctor   Call the physician if:   - Fever over 100.5? F  - Increased pain, drainage, redness, odor or heat around the incision area  - Shaking chills  - Increased knee pain with activity and rest  - Increased pain in calf, tenderness or redness above or below the knee  - Increased swelling of calf, ankle, foot  - Sudden increased shortness of breath, sudden onset of chest pain, localized chest pain with coughing  - Incision opening  Or, if there are any questions or concerns about medications or care.       -Is this patient being discharged with medication to prevent blood clots?  Yes, Aspirin Aspirin, ASA oral tablets  What is this medicine?  ASPIRIN (AS pir in) is a pain reliever. It is used to treat mild pain and fever. This medicine is also used as directed by a doctor to prevent and to treat heart attacks, to prevent strokes, and to treat arthritis or inflammation.  This medicine may be used for other purposes; ask your health care provider or pharmacist if you have questions.  COMMON BRAND NAME(S): Aspir-Low, Aspir-Laurie , Aspirtab , Jessica Advanced Aspirin, Jessica Aspirin Extra Strength, Jessica Aspirin Plus, Jessica Aspirin, Jessica  Genuine Aspirin, Jessica Womens Aspirin , Bufferin Extra Strength, Bufferin Low Dose, Bufferin  What should I tell my health care provider before I take this medicine?  They need to know if you have any of these conditions:  -anemia  -asthma  -bleeding problems  -child with chickenpox, the flu, or other viral infection  -diabetes  -gout  -if you frequently drink alcohol containing drinks  -kidney disease  -liver disease  -low level of vitamin K  -lupus  -smoke tobacco  -stomach ulcers or other problems  -an unusual or allergic reaction to aspirin, tartrazine dye, other medicines, dyes, or preservatives  -pregnant or trying to get pregnant  -breast-feeding  How should I use this medicine?  Take this medicine by mouth with a glass of water. Follow the directions on the package or prescription label. You can take this medicine with or without food. If it upsets your stomach, take it with food. Do not take your medicine more often than directed.  Talk to your pediatrician regarding the use of this medicine in children. While this drug may be prescribed for children as young as 12 years of age for selected conditions, precautions do apply. Children and teenagers should not use this medicine to treat chicken pox or flu symptoms unless directed by a doctor.  Patients over 65 years old may have a stronger reaction and need a smaller dose.  Overdosage: If you think you have taken too much of this medicine contact a poison control center or emergency room at once.  NOTE: This medicine is only for you. Do not share this medicine with others.  What if I miss a dose?  If you are taking this medicine on a regular schedule and miss a dose, take it as soon as you can. If it is almost time for your next dose, take only that dose. Do not take double or extra doses.  What may interact with this medicine?  Do not take this medicine with any of the following medications:  -cidofovir  -ketorolac  -probenecid  This medicine may also  interact with the following medications:  -alcohol  -alendronate  -bismuth subsalicylate  -flavocoxid  -herbal supplements like feverfew, garlic, bee, ginkgo biloba, horse chestnut  -medicines for diabetes or glaucoma like acetazolamide, methazolamide  -medicines for gout  -medicines that treat or prevent blood clots like enoxaparin, heparin, ticlopidine, warfarin  -other aspirin and aspirin-like medicines  -NSAIDs, medicines for pain and inflammation, like ibuprofen or naproxen  -pemetrexed  -sulfinpyrazone  -varicella live vaccine  This list may not describe all possible interactions. Give your health care provider a list of all the medicines, herbs, non-prescription drugs, or dietary supplements you use. Also tell them if you smoke, drink alcohol, or use illegal drugs. Some items may interact with your medicine.  What should I watch for while using this medicine?  If you are treating yourself for pain, tell your doctor or health care professional if the pain lasts more than 10 days, if it gets worse, or if there is a new or different kind of pain. Tell your doctor if you see redness or swelling. Also, check with your doctor if you have a fever that lasts for more than 3 days. Only take this medicine to prevent heart attacks or blood clotting if prescribed by your doctor or health care professional.  Do not take aspirin or aspirin-like medicines with this medicine. Too much aspirin can be dangerous. Always read the labels carefully.  This medicine can irritate your stomach or cause bleeding problems. Do not smoke cigarettes or drink alcohol while taking this medicine. Do not lie down for 30 minutes after taking this medicine to prevent irritation to your throat.  If you are scheduled for any medical or dental procedure, tell your healthcare provider that you are taking this medicine. You may need to stop taking this medicine before the procedure.  What side effects may I notice from receiving this  medicine?  Side effects that you should report to your doctor or health care professional as soon as possible:  -allergic reactions like skin rash, itching or hives, swelling of the face, lips, or tongue  -black, tarry stools  -bloody, coffee ground-like vomit  -breathing problems  -changes in hearing, ringing in the ears  -confusion  -general ill feeling or flu-like symptoms  -pain on swallowing  -redness, blistering, peeling or loosening of the skin, including inside the mouth or nose  -trouble passing urine or change in the amount of urine  -unusual bleeding or bruising  -unusually weak or tired  -yellowing of the eyes or skin  Side effects that usually do not require medical attention (report to your doctor or health care professional if they continue or are bothersome):  -diarrhea or constipation  -nausea, vomiting  -stomach gas, heartburn  This list may not describe all possible side effects. Call your doctor for medical advice about side effects. You may report side effects to FDA at 6-448-FDA-7905.  Where should I keep my medicine?  Keep out of the reach of children.  Store at room temperature between 15 and 30 degrees C (59 and 86 degrees F). Protect from heat and moisture. Do not use this medicine if it has a strong vinegar smell. Throw away any unused medicine after the expiration date.  NOTE: This sheet is a summary. It may not cover all possible information. If you have questions about this medicine, talk to your doctor, pharmacist, or health care provider.  © 2014, [employer]. (3/10/2009 10:44:17 AM)      · Is patient discharged on Warfarin / Coumadin?   No     · Is patient Post Blood Transfusion?  No    Depression / Suicide Risk    As you are discharged from this [facility] facility, it is important to learn how to keep safe from harming yourself.    Recognize the warning signs:  · Abrupt changes in personality, positive or negative- including increase in energy   · Giving away  possessions  · Change in eating patterns- significant weight changes-  positive or negative  · Change in sleeping patterns- unable to sleep or sleeping all the time   · Unwillingness or inability to communicate  · Depression  · Unusual sadness, discouragement and loneliness  · Talk of wanting to die  · Neglect of personal appearance   · Rebelliousness- reckless behavior  · Withdrawal from people/activities they love  · Confusion- inability to concentrate     If you or a loved one observes any of these behaviors or has concerns about self-harm, here's what you can do:  · Talk about it- your feelings and reasons for harming yourself  · Remove any means that you might use to hurt yourself (examples: pills, rope, extension cords, firearm)  · Get professional help from the community (Mental Health, Substance Abuse, psychological counseling)  · Do not be alone:Call your Safe Contact- someone whom you trust who will be there for you.  · Call your local CRISIS HOTLINE 614-3367 or 973-319-1957  · Call your local Children's Mobile Crisis Response Team [place] (148) 359-9605 or [employer]  · Call the toll free National Suicide Prevention Hotlines   · National Suicide Prevention Lifeline 796-009-GNCI (2181)  · National Hope Line Network 800-SUICIDE (392-7408)        Your appointments     Arturo 15, 2017 11:20 AM   Established Patient Pul with JACK Gilbert   [facility] (--)    [address]  [address]  [facility] [id number] [phone]                 Discharge Medication Instructions:    Below are the medications your physician expects you to take upon discharge:    Review all your home medications and newly ordered medications with your doctor and/or pharmacist. Follow medication instructions as directed by your doctor and/or pharmacist.    Please keep your medication list with you and share with your physician.               Medication List      START taking these  medications        Instructions    Morning Afternoon Evening Bedtime    Aspirin 325 MG Tbec   Last time this was given:  325 mg on 5/3/2017  5:25 AM        Take 1 Tab by mouth 2 times a day for 15 days.   Dose:  325 mg                        hydrocodone-acetaminophen 5-325 MG Tabs per tablet   Last time this was given:  2 Tabs on 5/3/2017  3:28 AM   Commonly known as:  NORCO        Take 1-2 Tabs by mouth every 6 hours as needed (As needed for breakthrough pain. Max 8 tabs daily. Wean off pain medications as tolerated. Do not drive while taking. No ETOH.).   Dose:  1-2 Tab                        meloxicam 15 MG tablet   Commonly known as:  MOBIC        Take 1 Tab by mouth every day.   Dose:  15 mg                          CHANGE how you take these medications        Instructions    Morning Afternoon Evening Bedtime    tramadol 50 MG Tabs   What changed:    - how much to take  - reasons to take this   Last time this was given:  50 mg on 5/2/2017  9:39 PM   Commonly known as:  ULTRAM        Take 1-2 Tabs by mouth every 6 hours as needed for Mild Pain.   Dose:   mg                          CONTINUE taking these medications        Instructions    Morning Afternoon Evening Bedtime    diphenhydrAMINE 50 MG Caps   Commonly known as:  BENADRYL        Take 50 mg by mouth at bedtime as needed.   Dose:  50 mg                        escitalopram 20 MG tablet   Commonly known as:  LEXAPRO                             fluticasone 50 MCG/ACT nasal spray   Last time this was given:  50 mcg on 5/3/2017  8:59 AM   Commonly known as:  FLONASE        Spray 1 Spray in nose every morning. Each Nostril   Dose:  1 Spray                        fluticasone-salmeterol 500-50 MCG/DOSE Aepb   Last time this was given:  1 Puff on 5/3/2017  8:59 AM   Commonly known as:  ADVAIR        Inhale 1 Puff by mouth every 12 hours.   Dose:  1 Puff                        gabapentin 300 MG Caps   Last time this was given:  300 mg on 5/3/2017  8:59 AM    Commonly known as:  NEURONTIN        Take 1 Cap by mouth 3 times a day.   Dose:  300 mg                        losartan 100 MG Tabs   Last time this was given:  100 mg on 5/3/2017  8:59 AM   Commonly known as:  COZAAR        Take 100 mg by mouth every day.   Dose:  100 mg                        montelukast 10 MG Tabs   Last time this was given:  10 mg on 5/3/2017  8:59 AM   Commonly known as:  SINGULAIR        Take 10 mg by mouth every morning.   Dose:  10 mg                        nystatin 178457 UNIT/ML Susp   Commonly known as:  MYCOSTATIN        Take 100,000 Units by mouth 5 Times a Day.   Dose:  176621 Units                        nystatin/triamcinolone 134550-0.1 UNIT/GM-% Crea   Commonly known as:  MYCOLOG        Apply  to affected area(s) 4 times a day.                        omeprazole 20 MG delayed-release capsule   Last time this was given:  20 mg on 5/3/2017  8:59 AM   Commonly known as:  PRILOSEC        Take 20 mg by mouth 2 Times a Day.   Dose:  20 mg                        * albuterol 2.5mg/3ml Nebu solution for nebulization   Commonly known as:  PROVENTIL                             * PROAIR  (90 BASE) MCG/ACT Aers inhalation aerosol   Generic drug:  albuterol        Inhale 2 Puffs by mouth every 6 hours as needed. Shortness of breath  Indications: Asthma   Dose:  2 Puff                        STOOL SOFTENER PO   Last time this was given:  100 mg on 5/3/2017  9:04 AM        Take  by mouth.                        trazodone 50 MG Tabs   Last time this was given:  50 mg on 5/2/2017  9:39 PM   Commonly known as:  DESYREL        Take 50 mg by mouth every evening.   Dose:  50 mg                        * Notice:  This list has 2 medication(s) that are the same as other medications prescribed for you. Read the directions carefully, and ask your doctor or other care provider to review them with you.      STOP taking these medications     guaifenesin-codeine Soln oral solution   Commonly known as:   FELICITAS AC               ibuprofen 800 MG Tabs   Commonly known as:  MOTRIN                    Where to Get Your Medications      You can get these medications from any pharmacy     Bring a paper prescription for each of these medications    - Aspirin 325 MG Tbec  - hydrocodone-acetaminophen 5-325 MG Tabs per tablet  - meloxicam 15 MG tablet  - tramadol 50 MG Tabs            Instructions           Diet / Nutrition:    Follow any diet instructions given to you by your doctor or the dietician, including how much salt (sodium) you are allowed each day.    If you are overweight, talk to your doctor about a weight reduction plan.    Activity:    Remain physically active following your doctor's instructions about exercise and activity.    Rest often.     Any time you become even a little tired or short of breath, SIT DOWN and rest.    Worsening Symptoms:    Report any of the following signs and symptoms to the doctor's office immediately:    *Pain of jaw, arm, or neck  *Chest pain not relieved by medication                               *Dizziness or loss of consciousness  *Difficulty breathing even when at rest   *More tired than usual                                       *Bleeding drainage or swelling of surgical site  *Swelling of feet, ankles, legs or stomach                 *Fever (>100ºF)  *Pink or blood tinged sputum  *Weight gain (3lbs/day or 5lbs /week)           *Shock from internal defibrillator (if applicable)  *Palpitations or irregular heartbeats                *Cool and/or numb extremities    Stroke Awareness    Common Risk Factors for Stroke include:    Age  Atrial Fibrillation  Carotid Artery Stenosis  Diabetes Mellitus  Excessive alcohol consumption  High blood pressure  Overweight   Physical inactivity  Smoking    Warning signs and symptoms of a stroke include:    *Sudden numbness or weakness of the face, arm or leg (especially on one side of the body).  *Sudden confusion, trouble speaking or  understanding.  *Sudden trouble seeing in one or both eyes.  *Sudden trouble walking, dizziness, loss of balance or coordination.Sudden severe headache with no known cause.    It is very important to get treatment quickly when a stroke occurs. If you experience any of the above warning signs, call 911 immediately.                   Disclaimer         Quit Smoking / Tobacco Use:    I understand the use of any tobacco products increases my chance of suffering from future heart disease or stroke and could cause other illnesses which may shorten my life. Quitting the use of tobacco products is the single most important thing I can do to improve my health. For further information on smoking / tobacco cessation call a Toll Free Quit Line at 1-627.348.3120 (*[employer] [place]) or 1-890.565.5032 (American Lung Association) or you can access the web based program at www.lungusa.org.    [place] Tobacco Users Help Line:  (910) 426-4083       Toll Free: 1-144.333.3018  Quit Tobacco Program [facility] [phone])[phone]    Crisis Hotline:    [place] Crisis Hotline:  8-348-YPJDTKA or 1-784.764.7854    [place] Crisis Hotline:    1-478.703.3772 or 043-399-2447    Discharge Survey:   Thank you for choosing [facility]. We hope we did everything we could to make your hospital stay a pleasant one. You may be receiving a phone survey and we would appreciate your time and participation in answering the questions. Your input is very valuable to us in our efforts to improve our service to our patients and their families.        My signature on this form indicates that:    1. I have reviewed and understand the above information.  2. My questions regarding this information have been answered to my satisfaction.  3. I have formulated a plan with my discharge nurse to obtain my prescribed medications for home.                  Disclaimer         __________________________________                     __________        ________                       Patient Signature                                                 Date                    Time

## 2017-05-02 LAB
ERYTHROCYTE [DISTWIDTH] IN BLOOD BY AUTOMATED COUNT: 46.3 FL (ref 35.9–50)
HCT VFR BLD AUTO: 32.4 % (ref 37–47)
HGB BLD-MCNC: 10.9 G/DL (ref 12–16)
MCH RBC QN AUTO: 30.4 PG (ref 27–33)
MCHC RBC AUTO-ENTMCNC: 33.6 G/DL (ref 33.6–35)
MCV RBC AUTO: 90.5 FL (ref 81.4–97.8)
PLATELET # BLD AUTO: 202 K/UL (ref 164–446)
PMV BLD AUTO: 9.3 FL (ref 9–12.9)
RBC # BLD AUTO: 3.58 M/UL (ref 4.2–5.4)
WBC # BLD AUTO: 10.1 K/UL (ref 4.8–10.8)

## 2017-05-02 PROCEDURE — 770006 HCHG ROOM/CARE - MED/SURG/GYN SEMI*

## 2017-05-02 PROCEDURE — 85027 COMPLETE CBC AUTOMATED: CPT

## 2017-05-02 PROCEDURE — 97162 PT EVAL MOD COMPLEX 30 MIN: CPT

## 2017-05-02 PROCEDURE — 94760 N-INVAS EAR/PLS OXIMETRY 1: CPT

## 2017-05-02 PROCEDURE — 94660 CPAP INITIATION&MGMT: CPT

## 2017-05-02 PROCEDURE — G8978 MOBILITY CURRENT STATUS: HCPCS | Mod: CK

## 2017-05-02 PROCEDURE — 36415 COLL VENOUS BLD VENIPUNCTURE: CPT

## 2017-05-02 PROCEDURE — 97530 THERAPEUTIC ACTIVITIES: CPT

## 2017-05-02 PROCEDURE — 97535 SELF CARE MNGMENT TRAINING: CPT

## 2017-05-02 PROCEDURE — 700112 HCHG RX REV CODE 229: Performed by: NURSE PRACTITIONER

## 2017-05-02 PROCEDURE — A9270 NON-COVERED ITEM OR SERVICE: HCPCS | Performed by: NURSE PRACTITIONER

## 2017-05-02 PROCEDURE — 97116 GAIT TRAINING THERAPY: CPT

## 2017-05-02 PROCEDURE — 700102 HCHG RX REV CODE 250 W/ 637 OVERRIDE(OP): Performed by: NURSE PRACTITIONER

## 2017-05-02 PROCEDURE — G8979 MOBILITY GOAL STATUS: HCPCS | Mod: CJ

## 2017-05-02 RX ADMIN — DOCUSATE SODIUM 100 MG: 100 CAPSULE, LIQUID FILLED ORAL at 21:39

## 2017-05-02 RX ADMIN — GABAPENTIN 300 MG: 300 CAPSULE ORAL at 15:25

## 2017-05-02 RX ADMIN — TRAZODONE HYDROCHLORIDE 50 MG: 50 TABLET ORAL at 21:39

## 2017-05-02 RX ADMIN — ASPIRIN 325 MG: 325 TABLET, DELAYED RELEASE ORAL at 18:22

## 2017-05-02 RX ADMIN — TRAMADOL HYDROCHLORIDE 100 MG: 50 TABLET, FILM COATED ORAL at 02:26

## 2017-05-02 RX ADMIN — OMEPRAZOLE 20 MG: 20 CAPSULE, DELAYED RELEASE ORAL at 09:12

## 2017-05-02 RX ADMIN — SENNOSIDES AND DOCUSATE SODIUM 1 TABLET: 8.6; 5 TABLET ORAL at 21:39

## 2017-05-02 RX ADMIN — POLYETHYLENE GLYCOL 3350 1 PACKET: 17 POWDER, FOR SOLUTION ORAL at 09:11

## 2017-05-02 RX ADMIN — OXYCODONE HYDROCHLORIDE 10 MG: 10 TABLET ORAL at 09:13

## 2017-05-02 RX ADMIN — HYDROCODONE BITARTRATE AND ACETAMINOPHEN 2 TABLET: 5; 325 TABLET ORAL at 13:19

## 2017-05-02 RX ADMIN — GABAPENTIN 300 MG: 300 CAPSULE ORAL at 21:39

## 2017-05-02 RX ADMIN — GABAPENTIN 300 MG: 300 CAPSULE ORAL at 09:12

## 2017-05-02 RX ADMIN — OXYCODONE HYDROCHLORIDE 10 MG: 10 TABLET ORAL at 05:26

## 2017-05-02 RX ADMIN — FLUTICASONE PROPIONATE 50 MCG: 50 SPRAY, METERED NASAL at 09:17

## 2017-05-02 RX ADMIN — HYDROCODONE BITARTRATE AND ACETAMINOPHEN 2 TABLET: 5; 325 TABLET ORAL at 19:36

## 2017-05-02 RX ADMIN — ASPIRIN 325 MG: 325 TABLET, DELAYED RELEASE ORAL at 05:25

## 2017-05-02 RX ADMIN — MONTELUKAST SODIUM 10 MG: 10 TABLET, FILM COATED ORAL at 09:13

## 2017-05-02 RX ADMIN — OMEPRAZOLE 20 MG: 20 CAPSULE, DELAYED RELEASE ORAL at 21:39

## 2017-05-02 RX ADMIN — DOCUSATE SODIUM 100 MG: 100 CAPSULE, LIQUID FILLED ORAL at 09:12

## 2017-05-02 RX ADMIN — TRAMADOL HYDROCHLORIDE 50 MG: 50 TABLET, FILM COATED ORAL at 21:39

## 2017-05-02 RX ADMIN — LOSARTAN POTASSIUM 100 MG: 25 TABLET, FILM COATED ORAL at 09:12

## 2017-05-02 ASSESSMENT — PAIN SCALES - GENERAL
PAINLEVEL_OUTOF10: 2
PAINLEVEL_OUTOF10: 9
PAINLEVEL_OUTOF10: 6
PAINLEVEL_OUTOF10: 8
PAINLEVEL_OUTOF10: 10

## 2017-05-02 ASSESSMENT — GAIT ASSESSMENTS
GAIT LEVEL OF ASSIST: CONTACT GUARD ASSIST
DISTANCE (FEET): 10
DEVIATION: STEP TO
ASSISTIVE DEVICE: FRONT WHEEL WALKER
DISTANCE (FEET): 30
DEVIATION: STEP TO
GAIT LEVEL OF ASSIST: CONTACT GUARD ASSIST
ASSISTIVE DEVICE: FRONT WHEEL WALKER

## 2017-05-02 ASSESSMENT — ENCOUNTER SYMPTOMS
FEVER: 0
ABDOMINAL PAIN: 0
SHORTNESS OF BREATH: 0
CHILLS: 0
VOMITING: 0
NAUSEA: 0

## 2017-05-02 NOTE — FLOWSHEET NOTE
05/01/17 2131   Events/Summary/Plan   Events/Summary/Plan Pt was placed on home cpap device at this time, Pt tolerating well , device checked for compliance at this itme    General Vent Information   Pulse Oximetry 96 %   Heart Rate (Monitored) 100   CPAP/BiPAP [employer]   Nocturnal CPAP or BiPAP CPAP   #System Evaluation Yes   Home Unit Used? Yes   Equipment Inspected for Cleanliness, Operation, and Safety? Yes   Settings (If Known) unknown    FiO2 or LPM 2.5   Home Mask Used? Yes   Breath Sounds   Pre/Post Intervention Pre Intervention Assessment   RUL Breath Sounds Clear   RML Breath Sounds Clear   RLL Breath Sounds Diminished   MIGUEL Breath Sounds Clear   LLL Breath Sounds Diminished

## 2017-05-02 NOTE — FLOWSHEET NOTE
05/02/17 0215   Events/Summary/Plan   Events/Summary/Plan Pt remains asleep on home cpap at this time with no respiratory distress noted at this time    General Vent Information   Pulse Oximetry 94 %   Heart Rate (Monitored) 98   CPAP/BiPAP [employer]   Nocturnal CPAP or BiPAP CPAP   #System Evaluation Yes   Home Unit Used? Yes   Equipment Inspected for Cleanliness, Operation, and Safety? Yes   Settings (If Known) unknown   FiO2 or LPM 2.5   Home Mask Used? Yes   Breath Sounds   Pre/Post Intervention Pre Intervention Assessment   RUL Breath Sounds Clear   RML Breath Sounds Clear   RLL Breath Sounds Diminished   MIGUEL Breath Sounds Clear   LLL Breath Sounds Diminished

## 2017-05-02 NOTE — THERAPY
"Physical Therapy Treatment completed.   Bed Mobility:  Supine to Sit: Moderate Assist  Transfers: Sit to Stand: Minimal Assist  Gait: Level Of Assist: Contact Guard Assist with Front-Wheel Walker 30 ft, three times w/ seated rest breaks among each.   R knee ROM 0-90 degrees.       Plan of Care: Will benefit from Physical Therapy 7 times per week  Discharge Recommendations: Equipment: No Equipment Needed. Post-acute therapy Discharge to home with outpatient or home health for additional skilled therapy services.    Improving alertness and ability to participate this afternoon. Strongly encouraged ambulation 2 more times today with nursing assist. PT completed thorough post op mobility and activity education w/ patient and spouse. Continue. Anticipate home w/ spouse tomorrow pending continued improvement.      See \"Rehab Therapy-Acute\" Patient Summary Report for complete documentation.       "

## 2017-05-02 NOTE — THERAPY
"Occupational Therapy- Attempted OT eval this am, pt refused stating she was too tired and just needed to rest since \"they were making me sleep with my leg straight all night and I hurt.  Pt observed to be having difficulty with PT later in the morning getting OOB- she was light headed and pale.  Will follow and see for eval as able.   "

## 2017-05-02 NOTE — PROGRESS NOTES
Progress Note               Author: Adrianna Miguel Date & Time created: 2017  7:08 AM     Interval History:  POD # 1 TKA  Doing ok. Block wearing off, c/o pain.  Voiding  Tolerating diet.   Denies any respiratory issues.   Drowsy with pain medications.    Review of Systems:  Review of Systems   Constitutional: Negative for fever and chills.   Respiratory: Negative for shortness of breath.    Cardiovascular: Negative for chest pain.   Gastrointestinal: Negative for nausea, vomiting and abdominal pain.       Physical Exam:  Physical Exam   Awake, oriented  Drowsy  DNVI  PF/DF intact  Dressing - small sero sang drainage.       Labs:        Invalid input(s): [id number]      No results for input(s): SODIUM, POTASSIUM, CHLORIDE, CO2, BUN, CREATININE, MAGNESIUM, PHOSPHORUS, CALCIUM in the last 72 hours.  No results for input(s): ALTSGPT, ASTSGOT, ALKPHOSPHAT, TBILIRUBIN, DBILIRUBIN, GAMMAGT, AMYLASE, LIPASE, ALB, PREALBUMIN, GLUCOSE in the last 72 hours.  Recent Labs      17   0433   RBC  3.58*   HEMOGLOBIN  10.9*   HEMATOCRIT  32.4*   PLATELETCT  202     Recent Labs      17   0433   WBC  10.1     Hemodynamics:  Temp (24hrs), Av.1 °C (98.7 °F), Min:36.7 °C (98 °F), Max:37.6 °C (99.7 °F)  Temperature: 37.6 °C (99.7 °F) (RN was notified)  Pulse  Av.9  Min: 66  Max: 93Heart Rate (Monitored): 98  Blood Pressure : 135/61 mmHg     Respiratory:    Respiration: 16, Pulse Oximetry: 94 %, O2 Daily Delivery Respiratory : Nasal Cannula        RUL Breath Sounds: Clear, RML Breath Sounds: Clear, RLL Breath Sounds: Diminished, MIGUEL Breath Sounds: Clear, LLL Breath Sounds: Diminished  Fluids:    Intake/Output Summary (Last 24 hours) at 17 0708  Last data filed at 17 0500   Gross per 24 hour   Intake   2650 ml   Output   1450 ml   Net   1200 ml     Weight: 110 kg (242 lb 8.1 oz)  GI/Nutrition:  Orders Placed This Encounter   Procedures   • DIET ORDER     Standing Status: Standing      Number of  Occurrences: 1      Standing Expiration Date:      Order Specific Question:  Diet:     Answer:  Regular [1]     Medical Decision Making, by Problem:  Active Hospital Problems    Diagnosis   • 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 No acute events.  Monitor pain as block wears off, but try to minimize pain medications due to history of drowsiness and pneumonia post op.   Cont PT/OT today.   Assess for d/c home tomorrow.   Cont. Aggressive RT protocol.     Core Measures

## 2017-05-02 NOTE — DIETARY
NUTRITION SERVICES: BMI - Pt with BMI >40 (44.51). Weight loss counseling not appropriate in acute care setting. RECOMMEND - Referral to outpatient nutrition services for weight management after D/C.

## 2017-05-02 NOTE — CARE PLAN
Problem: Respiratory:  Goal: Respiratory status will improve  Intervention: Educate and encourage incentive spirometry usage  IS encouraged 10 times per hour and reinforcement needed, patient continues to use      Problem: Mobility  Goal: Risk for activity intolerance will decrease  Intervention: Provide rest periods  Rest provided during ambulation  Intervention: Encourage patient to increase activity level in collaboration with Interdisciplinary Team  Patient encouraged to mobilize with PT

## 2017-05-02 NOTE — DISCHARGE PLANNING
Care Transition Team Assessment    Met with pt and  at bedside. Pt has cane, walker, and wheelchair at home. Pt states she will discharge home with no needs.    Information Source  Orientation : Oriented x 4  Information Given By: Patient  Informant's Name: Cyndy Petit  Who is responsible for making decisions for patient? : Patient    Readmission Evaluation  Is this a readmission?: No    Elopement Risk  Legal Hold: No  Elopement Risk: Not at Risk for Elopement    Interdisciplinary Discharge Planning  Does Admitting Nurse Feel This Could be a Complex Discharge?: No  Primary Care Physician: Dr Browne  Lives with - Patient's Self Care Capacity: Spouse  Patient or legal guardian wants to designate a caregiver (see row info): No  Support Systems: Family Member(s)  Housing / Facility: [address]  Do You Take your Prescribed Medications Regularly: Yes  Able to Return to Previous ADL's: Yes  Mobility Issues: No  Prior Services: None  Patient Expects to be Discharged to:: home  Assistance Needed: No  Durable Medical Equipment: Not Applicable    Discharge Preparedness  What is your plan after discharge?: Home with help  What are your discharge supports?: Spouse  Prior Functional Level: Ambulatory, Drives Self, Independent with Activities of Daily Living, Independent with Medication Management, Uses Cane, Uses Walker, Uses Wheelchair  Difficulity with ADLs: Walking  Difficulty with ADLs Comment: Pt states she has cane, walker, and wheelchair  Difficulity with IADLs: None    Functional Assesment  Prior Functional Level: Ambulatory, Drives Self, Independent with Activities of Daily Living, Independent with Medication Management, Uses Cane, Uses Walker, Uses Wheelchair    Finances  Financial Barriers to Discharge: No  Prescription Coverage: Yes (Pharmacy: [facility], [place])    Vision / Hearing Impairment  Vision Impairment : Yes  Right Eye Vision: Impaired, Wears Glasses  Left Eye Vision: Impaired, Wears  Glasses    Values / Beliefs / Concerns  Spiritual Requests During Hospitalization: No    Advance Directive  Advance Directive?: None  Advance Directive offered?: AD Booklet refused    Domestic Abuse  Have you ever been the victim of abuse or violence?: No    Psychological Assessment  History of Substance Abuse: None  History of Psychiatric Problems: No  Non-compliant with Treatment: No  Newly Diagnosed Illness: No    Discharge Risks or Barriers  Discharge risks or barriers?: No    Anticipated Discharge Information  Anticipated discharge disposition: Home  Discharge Address: [address] [place] [address] [place] [address]  Discharge Contact Phone Number: [phone]

## 2017-05-02 NOTE — THERAPY
"Physical Therapy Evaluation completed.   Transfers: Sit to Stand: Contact Guard Assist  Gait: Level Of Assist: Contact Guard Assist with Front-Wheel Walker 10 ft, WBAT on RLE.      Plan of Care: Plan to complete next treatment by 3 PM today.   Discharge Recommendations: Equipment: No Equipment Needed. Post-acute therapy Discharge to home with outpatient or home health for additional skilled therapy services.    70 yo female s/p R TKA (WBAT). PMH includes multiple orthopedic surgeries including L YASMEEN complicated by post op PNA 1 yr ago, shoulder arthroscopy, L/S surgeries. Pt is quite drowsy and reporting nausea and high pain levels during evaluation, therefore overall ambulation and activities limited. Pt will benefit from further acute PT for HEP training, ROM, strengthening, and progression of mobility to promote return to home with spouse and OP PT (scheduled to begin Thursday). Pt does live in a handicap accessible home and she has all home DME needed. RN updated.     See \"Rehab Therapy-Acute\" Patient Summary Report for complete documentation.     "

## 2017-05-02 NOTE — CARE PLAN
Problem: Safety  Goal: Will remain free from falls  Outcome: PROGRESSING AS EXPECTED  Intervention: Implement fall precautions  Call light within reach; bed locked and in lowest position; fall precautions in place; pt instructed to call before getting OOB; verbalizes understanding. Pt is a one-assist to BR, w/ FWW and tolerates well.       Problem: Respiratory:  Goal: Respiratory status will improve  Outcome: PROGRESSING AS EXPECTED  Intervention: Assess and monitor pulmonary status  Pt wears CPAP @ noc w/o O2; after assessment, pt tolerated CPAP w/ O2 and was able to keep oxygen sats above 90.

## 2017-05-02 NOTE — PROGRESS NOTES
Received report from day shift RN; assumed care of patient at 1900. Patient A&Ox4 sitting up in bed w/ minimal c/o pain to R knee; will medicate appropriately per MAR. Dressing to RLE CDI, +CMS able to plantar/dorsiflex R foot and wiggle toes. Pt denies N/V/D, C/P, SOB and dizziness. Pt c/o numbness to LLE, which is baseline for her. Polar ice intact to R knee. Discussed POC; medications and tests; pt verbalizes understanding. Fall precautions in place; call light within reach; hourly rounding. Encouraged use of IS 10x per hour while awake. Instructed pt to call before getting OOB; no further needs at this time. IVF infusing. Pt c/o dry mouth; given mouth swabs for added comfort.

## 2017-05-02 NOTE — FLOWSHEET NOTE
05/02/17 0708   Events/Summary/Plan   Events/Summary/Plan Patient already of CPAP unit,  saturations 95% on 2 lpm NC.   General Vent Information   Pulse Oximetry 95 %   CPAP/BiPAP [employer]   Pt is currently not wearing CPAP/BiPap unit Yes

## 2017-05-02 NOTE — CARE PLAN
Problem: Safety  Goal: Will remain free from injury  Intervention: Provide assistance with mobility  Initiate fall protocol,bed in low position at all times,call light with in reach  Ambulate with FWW      Problem: Pain Management  Goal: Pain level will decrease to patient’s comfort goal  Intervention: Follow pain managment plan developed in collaboration with patient and Interdisciplinary Team  Monitor pain control,offer pain medications as ordered,cold pack to affected area  Encourage foot pumps and mobilization.

## 2017-05-03 ENCOUNTER — PATIENT OUTREACH (OUTPATIENT)
Dept: HEALTH INFORMATION MANAGEMENT | Facility: OTHER | Age: 72
End: 2017-05-03

## 2017-05-03 VITALS
DIASTOLIC BLOOD PRESSURE: 75 MMHG | WEIGHT: 242.51 LBS | BODY MASS INDEX: 44.63 KG/M2 | HEART RATE: 83 BPM | HEIGHT: 62 IN | TEMPERATURE: 98.6 F | RESPIRATION RATE: 18 BRPM | SYSTOLIC BLOOD PRESSURE: 159 MMHG | OXYGEN SATURATION: 94 %

## 2017-05-03 PROBLEM — M17.11 RIGHT KNEE DJD: Status: RESOLVED | Noted: 2017-05-01 | Resolved: 2017-05-03

## 2017-05-03 LAB
ERYTHROCYTE [DISTWIDTH] IN BLOOD BY AUTOMATED COUNT: 45.1 FL (ref 35.9–50)
HCT VFR BLD AUTO: 32 % (ref 37–47)
HGB BLD-MCNC: 10.8 G/DL (ref 12–16)
MCH RBC QN AUTO: 30.5 PG (ref 27–33)
MCHC RBC AUTO-ENTMCNC: 33.8 G/DL (ref 33.6–35)
MCV RBC AUTO: 90.4 FL (ref 81.4–97.8)
PLATELET # BLD AUTO: 176 K/UL (ref 164–446)
PMV BLD AUTO: 9.2 FL (ref 9–12.9)
RBC # BLD AUTO: 3.54 M/UL (ref 4.2–5.4)
WBC # BLD AUTO: 9 K/UL (ref 4.8–10.8)

## 2017-05-03 PROCEDURE — 85027 COMPLETE CBC AUTOMATED: CPT

## 2017-05-03 PROCEDURE — 97165 OT EVAL LOW COMPLEX 30 MIN: CPT

## 2017-05-03 PROCEDURE — 97535 SELF CARE MNGMENT TRAINING: CPT

## 2017-05-03 PROCEDURE — 97116 GAIT TRAINING THERAPY: CPT

## 2017-05-03 PROCEDURE — 700112 HCHG RX REV CODE 229: Performed by: NURSE PRACTITIONER

## 2017-05-03 PROCEDURE — 97110 THERAPEUTIC EXERCISES: CPT

## 2017-05-03 PROCEDURE — 36415 COLL VENOUS BLD VENIPUNCTURE: CPT

## 2017-05-03 PROCEDURE — A9270 NON-COVERED ITEM OR SERVICE: HCPCS | Performed by: NURSE PRACTITIONER

## 2017-05-03 PROCEDURE — G8988 SELF CARE GOAL STATUS: HCPCS | Mod: CJ

## 2017-05-03 PROCEDURE — G8989 SELF CARE D/C STATUS: HCPCS | Mod: CJ

## 2017-05-03 PROCEDURE — 94760 N-INVAS EAR/PLS OXIMETRY 1: CPT

## 2017-05-03 PROCEDURE — G8987 SELF CARE CURRENT STATUS: HCPCS | Mod: CJ

## 2017-05-03 PROCEDURE — 700102 HCHG RX REV CODE 250 W/ 637 OVERRIDE(OP): Performed by: NURSE PRACTITIONER

## 2017-05-03 RX ORDER — ASPIRIN 325 MG
325 TABLET, DELAYED RELEASE (ENTERIC COATED) ORAL 2 TIMES DAILY
Qty: 30 TAB | Refills: 0 | Status: SHIPPED | OUTPATIENT
Start: 2017-05-03 | End: 2017-05-18

## 2017-05-03 RX ORDER — HYDROCODONE BITARTRATE AND ACETAMINOPHEN 5; 325 MG/1; MG/1
1-2 TABLET ORAL EVERY 6 HOURS PRN
Qty: 60 TAB | Refills: 0 | Status: SHIPPED | OUTPATIENT
Start: 2017-05-03 | End: 2017-11-14

## 2017-05-03 RX ORDER — MELOXICAM 15 MG/1
15 TABLET ORAL DAILY
Qty: 30 TAB | Refills: 0 | Status: SHIPPED | OUTPATIENT
Start: 2017-05-03 | End: 2017-11-13

## 2017-05-03 RX ORDER — TRAMADOL HYDROCHLORIDE 50 MG/1
50-100 TABLET ORAL EVERY 6 HOURS PRN
Qty: 50 TAB | Refills: 0 | Status: SHIPPED | OUTPATIENT
Start: 2017-05-03 | End: 2017-08-21

## 2017-05-03 RX ADMIN — LOSARTAN POTASSIUM 100 MG: 25 TABLET, FILM COATED ORAL at 08:59

## 2017-05-03 RX ADMIN — DOCUSATE SODIUM 100 MG: 100 CAPSULE, LIQUID FILLED ORAL at 09:04

## 2017-05-03 RX ADMIN — FLUTICASONE PROPIONATE 50 MCG: 50 SPRAY, METERED NASAL at 08:59

## 2017-05-03 RX ADMIN — HYDROCODONE BITARTRATE AND ACETAMINOPHEN 2 TABLET: 5; 325 TABLET ORAL at 03:28

## 2017-05-03 RX ADMIN — OXYCODONE HYDROCHLORIDE 5 MG: 5 TABLET ORAL at 00:30

## 2017-05-03 RX ADMIN — POLYETHYLENE GLYCOL 3350 1 PACKET: 17 POWDER, FOR SOLUTION ORAL at 08:59

## 2017-05-03 RX ADMIN — HYDROCODONE BITARTRATE AND ACETAMINOPHEN 2 TABLET: 5; 325 TABLET ORAL at 09:52

## 2017-05-03 RX ADMIN — MONTELUKAST SODIUM 10 MG: 10 TABLET, FILM COATED ORAL at 08:59

## 2017-05-03 RX ADMIN — OMEPRAZOLE 20 MG: 20 CAPSULE, DELAYED RELEASE ORAL at 08:59

## 2017-05-03 RX ADMIN — ASPIRIN 325 MG: 325 TABLET, DELAYED RELEASE ORAL at 05:25

## 2017-05-03 RX ADMIN — GABAPENTIN 300 MG: 300 CAPSULE ORAL at 08:59

## 2017-05-03 ASSESSMENT — ENCOUNTER SYMPTOMS
SHORTNESS OF BREATH: 0
CHILLS: 0
NAUSEA: 0
FEVER: 0
ABDOMINAL PAIN: 0
VOMITING: 0

## 2017-05-03 ASSESSMENT — PAIN SCALES - GENERAL
PAINLEVEL_OUTOF10: 4
PAINLEVEL_OUTOF10: 7
PAINLEVEL_OUTOF10: 6
PAINLEVEL_OUTOF10: 8

## 2017-05-03 ASSESSMENT — ACTIVITIES OF DAILY LIVING (ADL): TOILETING: INDEPENDENT

## 2017-05-03 ASSESSMENT — GAIT ASSESSMENTS
ASSISTIVE DEVICE: FRONT WHEEL WALKER
DISTANCE (FEET): 150
GAIT LEVEL OF ASSIST: STAND BY ASSIST
DEVIATION: DECREASED TOE OFF

## 2017-05-03 NOTE — THERAPY
"Occupational Therapy Evaluation completed.   Functional Status:  Pt up in chair prior to therapy.  SBA sit to stand.  SBA with FWW into bathroom, and on and off standard toilet with use of rail.  (pt has riser on toilet at home.)  Pt able to dress LB with SBA and AE except maxA for shoes.  Educated pt and spouse on role of OT and Total Knee Replacement Protocol.  Reviewed application of precautions and use of Adaptive Equipment for dressing, bathing, toileting, grooming, kitchen activities and general functional mobility in the home. Reviewed the use of reacher, sockaide, long handled shoe horn and long handled sponge, as well as shower chair and raised toilet seat to assist with ADL's.  Reviewed stall shower transfers. Provided pt with handout and suggestions on where to obtain needed items.  Educated on technique for donning TODD hose if spouse removes them for pt to shower.  Spouse reports he feels able to assist pt at home with self care and IADl's as needed.  Plan of Care: Patient with no further skilled OT needs in the acute care setting at this time  Discharge Recommendations:  Equipment: No Equipment Needed. Post-acute therapy Discharge to home with outpatient or home health for additional skilled therapy services.    See \"Rehab Therapy-Acute\" Patient Summary Report for complete documentation.    "

## 2017-05-03 NOTE — DISCHARGE SUMMARY
Date of Admission: 5/3/17  Date of Discharge: 5/3/17    Preoperative diagnosis: Right Knee DJD  Postoperative diagnosis: Right Knee DJD    Procedure performed: Right Total Knee Arthoplasty    Surgeon and Attending Physician: Dr. Jesús Rutledge    Complications: None    Discharge Condition: The patient will be discharged home in stable condition and good pain relief.     Clinical History: Cyndy is a 71 year old female who presented with a chief complaint of Right knee DJD. She underwent a Left TKA last year and recovered well. The patient presented with advanced knee DJD. The patient tried and failed extensive conservative treatments and elected to undergo a Total knee arthroplasty.     Hospital Course: The patient was taken to the orthopedic unit following the procedure. The patient was in stable condition throughout the entire hospital stay. On Post op Day number 2 the patient was ambulatory safely in the reyes. The patient was cleared by physical therapy and occupational therapy prior to discharge. The patient was voiding, tolerating a regular diet, and had no complaints of nausea, vomiting, shortness of breath or chest pain. The patient was distally neurovascularly intact in bilateral lower extremities. Plantar flexion and dorsiflexion was 5/5 in strength. The incision was clean, dry and approximated, without signs of infection. Post op labs and vital signs remained stable. There were no complaints of calf pain to palpation, redness, heat or edema. She was on an aggressive RT protocol and her CPAP in the hospital. Respiratory status was stable and she was weaned off her O2 prior to discharge without difficulties.     Discharge instructions:  1. To follow up with Dr. Rutledge in 7-10 days  2. To monitor the incision closely, and call for symptoms of infection. Keep the incision clean, dry and covered. Ok to shower, but cover with saran wrap and tape. Change the dressing as needed.   3. Monitor for signs of DVT and  call if present.   4. Use over-the-counter stool softeners or laxatives as needed for constipation.  5. Wean off narcotics as tolerated.   6. Weight Bearing as tolerated  7. To start outpatient as scheduled, or within 7 days.     Discharge medications:  1. Aspirin 325 mg 1 tab PO BID x 14 days.   2. Norco 5/325mg 1-2 tabs PO Q 6 hours prn pain # 60 NR  3. Tramadol 50-100mg PO Q 6 hours prn pain # 50 NR  4. Meloxicam 15mg 1 tab PO Daily # 30 NR.    Thank you so much, we expect for this patient to do well.

## 2017-05-03 NOTE — FLOWSHEET NOTE
05/03/17 0329   Events/Summary/Plan   Events/Summary/Plan on CPAP   General Vent Information   Pulse Oximetry 98 %   Heart Rate (Monitored) 90   CPAP/BiPAP [employer]   Nocturnal CPAP or BiPAP CPAP   Home Unit Used? Yes   FiO2 or LPM 2.5   Home Mask Used? Yes

## 2017-05-03 NOTE — PROGRESS NOTES
· 5/3/17 at 4:35 PM--Received phone call from pt's  Ashish.  Pt was discharged from [facility] today 5/3/17.  Ashish and pt have questions regarding patient's new prescriptions.  Complete med rec done with pt's  regarding patient's new prescriptions, including names of meds, doses, and frequency.  Instructed pt's  on which meds are prn meds and which meds are to be taken daily and not prn.  Answered all of Ashish's questions.  Ashish verbalizes understanding.

## 2017-05-03 NOTE — THERAPY
"Physical Therapy Treatment completed.   Bed Mobility:  Supine to Sit: Supervised  Transfers: Sit to Stand: Stand by Assist  Gait: Level Of Assist: Stand by Assist with Front-Wheel Walker 150 ft.  Good ROM 0-95 degrees in R knee.       Plan of Care: Will benefit from Physical Therapy 3 times per week  Discharge Recommendations: Equipment: No Equipment Needed. Post-acute therapy Discharge to home with outpatient or home health for additional skilled therapy services.    PT completed thorough mobility, home activity, and HEP training with both patient and spouse. Pt and spouse are eager to return to home today and decline any further acute PT questions or concerns. Her spouse will provide continuous assist to patient at all times at home for optimal safety, especially when patient up to bathroom at night. Both patient and spouse understand importance of mobility at home to prevent complications such as PNA. She will follow up with OP PT beginning tomorrow. RN updated.      See \"Rehab Therapy-Acute\" Patient Summary Report for complete documentation.       "

## 2017-05-03 NOTE — PROGRESS NOTES
Report received at change of shift, care of pt assumed. Pt A&Ox4, c/o severe knee R pain and requesting pain medication. Pain level improved after pt was medicated with Norco. Surgical dressing with scant old drainage and polar ice machine in place. Pt denies any numbness or tingling and has 2+ distal pulses. Pt assisted to bathroom and back twice which she tolerated well, voiding without difficulty. Encouraged pt to cough, deep breathe, and use incentive spirometer 10x/hr while awake. Safety precautions in place, will continue to monitor

## 2017-05-03 NOTE — FLOWSHEET NOTE
05/02/17 8270   Events/Summary/Plan   Events/Summary/Plan Patient on CPAP   General Vent Information   Pulse Oximetry 96 %   Heart Rate (Monitored) 89   CPAP/BiPAP [employer]   Nocturnal CPAP or BiPAP CPAP   #System Evaluation Yes   Home Unit Used? Yes   Equipment Inspected for Cleanliness, Operation, and Safety? Yes   FiO2 or LPM 2.5   Home Mask Used? Yes

## 2017-05-03 NOTE — CARE PLAN
Problem: Safety  Goal: Will remain free from falls  Outcome: PROGRESSING AS EXPECTED  Call light and personal belongings within reach, bed in low locked position, treaded slipper socks in place, room free of clutter, bed alarm activated. Pt calls appropriately for assistance prior to getting up and verbalizes/demonstrates understanding of all fall precautions. Hourly rounding in place to ensure pt safety and needs are met.           Problem: Venous Thromboembolism (VTW)/Deep Vein Thrombosis (DVT) Prevention:  Goal: Patient will participate in Venous Thrombosis (VTE)/Deep Vein Thrombosis (DVT)Prevention Measures  Outcome: PROGRESSING AS EXPECTED  Krishna hose and SCD to LLE, foot pump to RLE. Encouraged ankle flex exercises while in bed.     Problem: Pain Management  Goal: Pain level will decrease to patient’s comfort goal  Outcome: PROGRESSING AS EXPECTED  Pt medicated with Norco, Oxy, and Tramadol with improvement in comfort status. Polar ice machine in place.     Problem: Respiratory:  Goal: Respiratory status will improve  Outcome: PROGRESSING AS EXPECTED  Discussed importance of coughing, deep breathing, and using incentive spirometer to decrease risk of developing pneumonia. Pt encouraged to use incentive spirometer 10x while awake.

## 2017-05-03 NOTE — DISCHARGE INSTRUCTIONS
Ice. Elevate. Call for incision redness, heat, pus drainage, edema, or fever, chills, nausea, vomiting, diarrhea. Take daily stool softeners or laxatives prn as narcotic pain medications cause constipation.  Ok to shower but keep incision dry and covered. Keep dressings in place for 5 days, change as needed for drainage. Keep incision covered with clean dressing until staples/stitches are removed. There is no need to place any ointment over the incision.  F/U with Dr. Rutledge in 7-10 days as scheduled. Weight Bearing as tolerated. START Physical Therapy This Week. Place Krishna Hose Bilateral lower extremities. You may remove them for one hour daily and for therapy; otherwise keep on until seen by Dr. Rutledge.   Discharge Instructions    Discharged to home by car with relative. Discharged via wheelchair, hospital escort: Yes.  Special equipment needed: Walker    Be sure to schedule a follow-up appointment with your primary care doctor or any specialists as instructed.     Discharge Plan:   Diet Plan: Discussed  Activity Level: Discussed  Confirmed Follow up Appointment: Patient to Call and Schedule Appointment  Confirmed Symptoms Management: Discussed  Medication Reconciliation Updated: Yes  Influenza Vaccine Indication: Not indicated: Previously immunized this influenza season and > 8 years of age    I understand that a diet low in cholesterol, fat, and sodium is recommended for good health. Unless I have been given specific instructions below for another diet, I accept this instruction as my diet prescription.   Other diet: Regular    Special Instructions: Discharge instructions for the Orthopedic Patient    Follow up with Primary Care Physician within 2 weeks of discharge to home, regarding:  Review of medications and diagnostic testing.  Surveillance for medical complications.  Workup and treatment of osteoporosis, if appropriate.     -Is this a Joint Replacement patient? Yes Total Joint Knee Replacement Discharge  Instructions    Pain  - The goal is to slowly wean off the prescription pain medicine.  - Ice can be used for pain control.  20 minutes at a time is recommended, and never directly against your skin or incision.  - Most patients are off the pain pills by 3 weeks; others may require a low level of pain medications for many months.  If your pain continues to be severe, follow up with your physician.  Infection    Knee joint infections; occur in fewer than 2% of patients. The most common causes of infection following total knee replacement surgery are from bacteria that enter the bloodstream during dental procedures, urinary tract infections, or skin infections. These bacteria can lodge around your knee replacement and cause an infection.  - Keep the incision as clean and dry as possible.  - Always wash your hands before touching your incision.  - Skin infections tend to develop around 7-10 days after surgery; most can be treated with oral antibiotics.  - Dental Care should be delayed for 3 months after surgery, your surgeon recommends taking a dose of antibiotics 1 hour prior to any dental procedure. After 2 years, most surgeons recommend antibiotics only before an extensive procedure.  Ask your surgeon what he recommends.  - Signs and symptoms of infection can include:  low grade fever, redness, pain, swelling and drainage from your incision.  Notify your surgeon immediately if you develop any of these symptoms.  Other instructions  - Bowel habits - constipation is extremely common and is caused by a combination of anesthesia, lack of mobility and pain medicine.  Use stool softeners or laxatives if necessary. It is important not to ignore this problem, as bowel obstructions can be a serious complication after joint replacement surgery.  - Mood/Energy Level - Many patients experience a lack of energy and endurance for up to 2-3 months after surgery.  Some may also feel down and can even become depressed.  This is  likely due to the postoperative anemia, change in activity level, lack of sleep, pain medicine and just the emotional reaction to the surgery itself that is a big disruption in a person’s life.  This usually passes.  If symptoms persist, follow up with your primary physician.  - Returning to work - Your surgeon will give you more specific instructions. Depending on the type of activities you perform, it may be 6 to 8 weeks before you return to work.   Generally, if you work a sedentary job requiring little standing or walking, most patients may return within 2-6 weeks.  Manual labor jobs involving walking, lifting and standing may take longer. Your surgeon’s office can provide a release to part-time or light duty work early on in your recovery and progress you to full duty as able.    - Driving - If your left knee was replaced and you have an automatic transmission, you may be able to begin driving in a week or so, provided you are no longer taking narcotic pain medication. If your right knee was replaced, avoid driving for 6 to 8 weeks. Remember that your reflexes may not be as sharp as before your surgery. Ask your surgeon for specific instructions.   - Avoiding falls - A fall during the first few weeks after surgery can damage your new knee and may result in a need for further surgery.   throw rugs and tack down loose carpeting.  Be aware of floor hazards such as pets, small objects or uneven surfaces.    - [employer] Metal Detectors - The sensitivity of metal detectors varies and it is likely that your prosthesis will cause an alarm.  Inform the  of your artificial joint.  Diet  - Resume your normal diet as tolerated.  - It is important to achieve a healthy nutritional status by eating a well balanced diet on a regular basis.  - Your physician may recommend that you take iron and vitamin supplements.   - Continue to drink plenty of fluids.  Shower/Bathing  - You may shower as soon as you get  home from the hospital unless otherwise instructed.  - Keep your incision out of water.  To keep the incision dry when showering, cover it with a plastic bag or plastic wrap.  - Pat incision dry if it gets wet.  Don’t rub.  - Do not submerge in a bath until staples are out and the incision is completely healed. (Approximately 6-8 weeks)  Dressing Change:  Procedure (if recommended by your physician)  - Wash hands.  - Open all new dressing change materials.  - Remove old dressing and discard.  - Inspect incision for redness, increase in clear drainage, yellow/green drainage, odor and surrounding skin hot to touch.  -  ABD (large gauze) pad or “island dressing” by one corner and lay over the incision.  Be careful not to touch the inside of the dressing that will lay over the incision.  - Secure in place as instructed (Ace wrap or tape).    Swelling/Bruising    - Swelling can last from 3-6 months.  - Elevate your leg higher than your heart while reclining.   The first week you are home you should elevate your leg an equal amount of time, as you are active.    - Anti-inflammatory pills can be taken once you have stopped the blood thinners.  - The swelling is usually worse after you go home since you are upright for longer periods of time.  - Bruising is common and can involve the entire leg including the thigh, calf and even foot. Bruising often does not appear until after you arrive home and it can be quite dramatic- purple, black, and green.  The bruising you can see is not usually concerning and will subside without any treatment.      Blood Clot Prevention  Blood clots in the legs and the less common, but frightening, clots that travel to the lungs are a real focus of our preventative. Most patients are at standard risk for them, but those patients who are at higher risk include people who have had previous clots, a family history of clotting, smoking, diabetes, obesity, advanced age, use of estrogen and a  sedentary lifestyle.    - Signs of blood clots in legs - Swelling in thigh, calf or ankle that does not go down with elevation.  Pain, heat and tenderness in calf, back of calf or groin area.  NOTE: blood clots can occur in either leg.  - You have been receiving anticoagulant therapy (blood thinners) in the hospital and you may be instructed to continue at home depending on your risk factors.  - Your risk for developing a clot continues for up to 2-3 months after surgery.  You should avoid prolonged sitting and dehydration during that time (long air trips and car trips).  If you do take a trip during this time, please get up and move around every 1- 1.5 hours.  - If you are prescribed blood-thinning medication for home, follow instructions as directed. (Handouts provided if applicable).      Activity  Once home, you should continue to stay active. The key is to remember not to overdo it! While you can expect some good days and some bad days, you should notice a gradual improvement and a gradual increase in your endurance over the next 6 to 12 months. Exercise is a critical component of home care, particularly during the first few weeks after surgery.     - Normal activities of daily living You should be able to resume most within 3 to 6 weeks following surgery. Some pain with activity and at night is common for several weeks after surgery  -  Physical Therapy Exercises - Continue to do the exercises prescribed for at least two months after surgery. Riding a stationary bicycle can help maintain muscle tone and keep your knee flexible. Try to achieve the maximum degree of bending and extension possible. (handout provided by Therapist).  - Sexual Activity -. Your surgeon can tell you when it safe to resume sexual activity.    - Sleeping Positions - You can safely sleep on your back, on either side, or on your stomach.   - Other Activities - Walk as much as you like, but remember that walking is no substitute for the  exercises your doctor and physical therapist will prescribe. Lower impact activities are preferred.  If you have specific questions, consult your Surgeon.    When to Call the Doctor   Call the physician if:   - Fever over 100.5? F  - Increased pain, drainage, redness, odor or heat around the incision area  - Shaking chills  - Increased knee pain with activity and rest  - Increased pain in calf, tenderness or redness above or below the knee  - Increased swelling of calf, ankle, foot  - Sudden increased shortness of breath, sudden onset of chest pain, localized chest pain with coughing  - Incision opening  Or, if there are any questions or concerns about medications or care.       -Is this patient being discharged with medication to prevent blood clots?  Yes, Aspirin Aspirin, ASA oral tablets  What is this medicine?  ASPIRIN (AS pir in) is a pain reliever. It is used to treat mild pain and fever. This medicine is also used as directed by a doctor to prevent and to treat heart attacks, to prevent strokes, and to treat arthritis or inflammation.  This medicine may be used for other purposes; ask your health care provider or pharmacist if you have questions.  COMMON BRAND NAME(S): Aspir-Low, Aspir-Laurie , Aspirtab , [employer] Advanced Aspirin, [employer] Aspirin Extra Strength, [employer] Aspirin Plus, [employer] Aspirin, [employer] Genuine Aspirin, [employer] Womens Aspirin , Bufferin Extra Strength, Bufferin Low Dose, Bufferin  What should I tell my health care provider before I take this medicine?  They need to know if you have any of these conditions:  -anemia  -asthma  -bleeding problems  -child with chickenpox, the flu, or other viral infection  -diabetes  -gout  -if you frequently drink alcohol containing drinks  -kidney disease  -liver disease  -low level of vitamin K  -lupus  -smoke tobacco  -stomach ulcers or other problems  -an unusual or allergic reaction to aspirin, tartrazine dye, other medicines, dyes, or preservatives  -pregnant or  trying to get pregnant  -breast-feeding  How should I use this medicine?  Take this medicine by mouth with a glass of water. Follow the directions on the package or prescription label. You can take this medicine with or without food. If it upsets your stomach, take it with food. Do not take your medicine more often than directed.  Talk to your pediatrician regarding the use of this medicine in children. While this drug may be prescribed for children as young as 12 years of age for selected conditions, precautions do apply. Children and teenagers should not use this medicine to treat chicken pox or flu symptoms unless directed by a doctor.  Patients over 65 years old may have a stronger reaction and need a smaller dose.  Overdosage: If you think you have taken too much of this medicine contact a poison control center or emergency room at once.  NOTE: This medicine is only for you. Do not share this medicine with others.  What if I miss a dose?  If you are taking this medicine on a regular schedule and miss a dose, take it as soon as you can. If it is almost time for your next dose, take only that dose. Do not take double or extra doses.  What may interact with this medicine?  Do not take this medicine with any of the following medications:  -cidofovir  -ketorolac  -probenecid  This medicine may also interact with the following medications:  -alcohol  -alendronate  -bismuth subsalicylate  -flavocoxid  -herbal supplements like feverfew, garlic, bee, ginkgo biloba, horse chestnut  -medicines for diabetes or glaucoma like acetazolamide, methazolamide  -medicines for gout  -medicines that treat or prevent blood clots like enoxaparin, heparin, ticlopidine, warfarin  -other aspirin and aspirin-like medicines  -NSAIDs, medicines for pain and inflammation, like ibuprofen or naproxen  -pemetrexed  -sulfinpyrazone  -varicella live vaccine  This list may not describe all possible interactions. Give your health care provider a  list of all the medicines, herbs, non-prescription drugs, or dietary supplements you use. Also tell them if you smoke, drink alcohol, or use illegal drugs. Some items may interact with your medicine.  What should I watch for while using this medicine?  If you are treating yourself for pain, tell your doctor or health care professional if the pain lasts more than 10 days, if it gets worse, or if there is a new or different kind of pain. Tell your doctor if you see redness or swelling. Also, check with your doctor if you have a fever that lasts for more than 3 days. Only take this medicine to prevent heart attacks or blood clotting if prescribed by your doctor or health care professional.  Do not take aspirin or aspirin-like medicines with this medicine. Too much aspirin can be dangerous. Always read the labels carefully.  This medicine can irritate your stomach or cause bleeding problems. Do not smoke cigarettes or drink alcohol while taking this medicine. Do not lie down for 30 minutes after taking this medicine to prevent irritation to your throat.  If you are scheduled for any medical or dental procedure, tell your healthcare provider that you are taking this medicine. You may need to stop taking this medicine before the procedure.  What side effects may I notice from receiving this medicine?  Side effects that you should report to your doctor or health care professional as soon as possible:  -allergic reactions like skin rash, itching or hives, swelling of the face, lips, or tongue  -black, tarry stools  -bloody, coffee ground-like vomit  -breathing problems  -changes in hearing, ringing in the ears  -confusion  -general ill feeling or flu-like symptoms  -pain on swallowing  -redness, blistering, peeling or loosening of the skin, including inside the mouth or nose  -trouble passing urine or change in the amount of urine  -unusual bleeding or bruising  -unusually weak or tired  -yellowing of the eyes or skin  Side  effects that usually do not require medical attention (report to your doctor or health care professional if they continue or are bothersome):  -diarrhea or constipation  -nausea, vomiting  -stomach gas, heartburn  This list may not describe all possible side effects. Call your doctor for medical advice about side effects. You may report side effects to FDA at 6-872-WQL-2194.  Where should I keep my medicine?  Keep out of the reach of children.  Store at room temperature between 15 and 30 degrees C (59 and 86 degrees F). Protect from heat and moisture. Do not use this medicine if it has a strong vinegar smell. Throw away any unused medicine after the expiration date.  NOTE: This sheet is a summary. It may not cover all possible information. If you have questions about this medicine, talk to your doctor, pharmacist, or health care provider.  © 2014, [employer]. (3/10/2009 10:44:17 AM)      · Is patient discharged on Warfarin / Coumadin?   No     · Is patient Post Blood Transfusion?  No    Depression / Suicide Risk    As you are discharged from this [facility] facility, it is important to learn how to keep safe from harming yourself.    Recognize the warning signs:  · Abrupt changes in personality, positive or negative- including increase in energy   · Giving away possessions  · Change in eating patterns- significant weight changes-  positive or negative  · Change in sleeping patterns- unable to sleep or sleeping all the time   · Unwillingness or inability to communicate  · Depression  · Unusual sadness, discouragement and loneliness  · Talk of wanting to die  · Neglect of personal appearance   · Rebelliousness- reckless behavior  · Withdrawal from people/activities they love  · Confusion- inability to concentrate     If you or a loved one observes any of these behaviors or has concerns about self-harm, here's what you can do:  · Talk about it- your feelings and reasons for harming yourself  · Remove any  means that you might use to hurt yourself (examples: pills, rope, extension cords, firearm)  · Get professional help from the community (Mental Health, Substance Abuse, psychological counseling)  · Do not be alone:Call your Safe Contact- someone whom you trust who will be there for you.  · Call your local CRISIS HOTLINE 326-5406 or 791-546-0623  · Call your local Children's Mobile Crisis Response Team [place] (108) 486-2588 or www.[employer]  · Call the toll free National Suicide Prevention Hotlines   · National Suicide Prevention Lifeline 235-471-AIBA (3307)  · National Hope Line Network 800-SUICIDE (485-8405)

## 2017-05-03 NOTE — DISCHARGE PLANNING
SW reviewed patient information.  Patient reportedly lives at home with her with her spouse and her discharge plan is to return home when medically able.  No current SS needs noted.

## 2017-05-03 NOTE — PROGRESS NOTES
Progress Note               Author: Adrianna Miguel Date & Time created: 5/3/2017  7:00 AM     Interval History:  POD # 2 TKA  Block has worn off. Pain controlled.   Voiding  Tolerating diet.   Denies any respiratory issues.   Drowsy with pain medications.    Review of Systems:  Review of Systems   Constitutional: Negative for fever and chills.   Respiratory: Negative for shortness of breath.    Cardiovascular: Negative for chest pain.   Gastrointestinal: Negative for nausea, vomiting and abdominal pain.       Physical Exam:  Physical Exam   Awake, oriented. Rouses easily. Sleeping in recliner chair.  Drowsy  DNVI  PF/DF intact  Dressing - small sero sang drainage.       Labs:        Invalid input(s): [id number]      No results for input(s): SODIUM, POTASSIUM, CHLORIDE, CO2, BUN, CREATININE, MAGNESIUM, PHOSPHORUS, CALCIUM in the last 72 hours.  No results for input(s): ALTSGPT, ASTSGOT, ALKPHOSPHAT, TBILIRUBIN, DBILIRUBIN, GAMMAGT, AMYLASE, LIPASE, ALB, PREALBUMIN, GLUCOSE in the last 72 hours.  Recent Labs      17   0433  17   0451   RBC  3.58*  3.54*   HEMOGLOBIN  10.9*  10.8*   HEMATOCRIT  32.4*  32.0*   PLATELETCT  202  176     Recent Labs      17   0433  17   0451   WBC  10.1  9.0     Hemodynamics:  Temp (24hrs), Av.2 °C (99 °F), Min:36.8 °C (98.2 °F), Max:37.6 °C (99.7 °F)  Temperature: 36.8 °C (98.2 °F)  Pulse  Av.5  Min: 66  Max: 93Heart Rate (Monitored): 90  Blood Pressure : 128/70 mmHg     Respiratory:    Respiration: 16, Pulse Oximetry: 98 %, O2 Daily Delivery Respiratory : Nasal Cannula        RUL Breath Sounds: Clear, RML Breath Sounds: Clear, RLL Breath Sounds: Clear;Diminished, MIGUEL Breath Sounds: Clear, LLL Breath Sounds: Clear;Diminished  Fluids:    Intake/Output Summary (Last 24 hours) at 17 0708  Last data filed at 17 0500   Gross per 24 hour   Intake   2650 ml   Output   1450 ml   Net   1200 ml        GI/Nutrition:  Orders Placed This Encounter    Procedures   • DIET ORDER     Standing Status: Standing      Number of Occurrences: 1      Standing Expiration Date:      Order Specific Question:  Diet:     Answer:  Regular [1]     Medical Decision Making, by Problem:  Active Hospital Problems    Diagnosis   • 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 No acute events.  Monitor pain as block wears off, but try to minimize pain medications due to history of drowsiness and pneumonia post op.   Cont PT/OT today.   Discussed d/c instructions.   D/C home today if cleared by therapies, meeting d/c criteria.   Cont. Aggressive RT protocol.     Core Measures

## 2017-05-04 ENCOUNTER — HOSPITAL ENCOUNTER (EMERGENCY)
Facility: MEDICAL CENTER | Age: 72
End: 2017-05-05
Attending: EMERGENCY MEDICINE
Payer: MEDICARE

## 2017-05-04 DIAGNOSIS — R06.02 SHORTNESS OF BREATH: ICD-10-CM

## 2017-05-04 PROCEDURE — 93005 ELECTROCARDIOGRAM TRACING: CPT

## 2017-05-04 PROCEDURE — 99284 EMERGENCY DEPT VISIT MOD MDM: CPT

## 2017-05-04 NOTE — ED AVS SNAPSHOT
[employer] Access Code: Activation code not generated  Current [employer] Status: Active    [employer]  A secure, online tool to manage your health information     [employer]® is a secure, online tool that connects you to your personalized health information from the privacy of your home -- day or night - making it very easy for you to manage your healthcare. Once the activation process is completed, you can even access your medical information using the [employer] abby, which is available for free in the Apple Abby store or Google Play store.     [employer] provides the following levels of access (as shown below):   My Chart Features   [facility] Primary Care Doctor [facility]  Specialists [facility]  Urgent  Care Non-[facility]  Primary Care  Doctor   Email your healthcare team securely and privately 24/7 X X X X   Manage appointments: schedule your next appointment; view details of past/upcoming appointments X      Request prescription refills. X      View recent personal medical records, including lab and immunizations X X X X   View health record, including health history, allergies, medications X X X X   Read reports about your outpatient visits, procedures, consult and ER notes X X X X   See your discharge summary, which is a recap of your hospital and/or ER visit that includes your diagnosis, lab results, and care plan. X X       How to register for [employer]:  1. Go to  [email or web address].  2. Click on the Sign Up Now box, which takes you to the New Member Sign Up page. You will need to provide the following information:  a. Enter your [employer] Access Code exactly as it appears at the top of this page. (You will not need to use this code after you’ve completed the sign-up process. If you do not sign up before the expiration date, you must request a new code.)   b. Enter your date of birth.   c. Enter your home email address.   d. Click Submit, and follow the next screen’s instructions.  3. Create a [employer] ID. This will  be your [employer] login ID and cannot be changed, so think of one that is secure and easy to remember.  4. Create a [employer] password. You can change your password at any time.  5. Enter your Password Reset Question and Answer. This can be used at a later time if you forget your password.   6. Enter your e-mail address. This allows you to receive e-mail notifications when new information is available in [employer].  7. Click Sign Up. You can now view your health information.    For assistance activating your [employer] account, call [phone]

## 2017-05-04 NOTE — ED AVS SNAPSHOT
Home Care Instructions                                                                                                                Cyndy Petit   MRN: [id number]    Department:  [facility], Emergency Dept   Date of Visit:  5/4/2017            [facility], Emergency Dept    [address]    [place] [address]    Phone:  [phone]      You were seen by     Yon Barrett M.D.      Your Diagnosis Was     Shortness of breath     R06.02       These are the medications you received during your hospitalization from 05/04/2017 2234 to 05/05/2017 0226     Date/Time Order Dose Route Action    05/05/2017 0142 iohexol (OMNIPAQUE) 350 mg/mL 75 mL Intravenous Given      Medication Information     Review all of your home medications and newly ordered medications with your primary doctor and/or pharmacist as soon as possible. Follow medication instructions as directed by your doctor and/or pharmacist.     Please keep your complete medication list with you and share with your physician. Update the information when medications are discontinued, doses are changed, or new medications (including over-the-counter products) are added; and carry medication information at all times in the event of emergency situations.               Medication List      ASK your doctor about these medications        Instructions    Morning Afternoon Evening Bedtime    Aspirin 325 MG Tbec        Take 1 Tab by mouth 2 times a day for 15 days.   Dose:  325 mg                        diphenhydrAMINE 50 MG Caps   Commonly known as:  BENADRYL        Take 50 mg by mouth at bedtime as needed.   Dose:  50 mg                        escitalopram 20 MG tablet   Commonly known as:  LEXAPRO                             fluticasone 50 MCG/ACT nasal spray   Commonly known as:  FLONASE        Spray 1 Spray in nose every morning. Each Nostril   Dose:  1 Spray                        fluticasone-salmeterol 500-50 MCG/DOSE  Aepb   Commonly known as:  ADVAIR        Inhale 1 Puff by mouth every 12 hours.   Dose:  1 Puff                        gabapentin 300 MG Caps   Commonly known as:  NEURONTIN        Take 1 Cap by mouth 3 times a day.   Dose:  300 mg                        hydrocodone-acetaminophen 5-325 MG Tabs per tablet   Commonly known as:  NORCO        Take 1-2 Tabs by mouth every 6 hours as needed (As needed for breakthrough pain. Max 8 tabs daily. Wean off pain medications as tolerated. Do not drive while taking. No ETOH.).   Dose:  1-2 Tab                        losartan 100 MG Tabs   Commonly known as:  COZAAR        Take 100 mg by mouth every day.   Dose:  100 mg                        meloxicam 15 MG tablet   Commonly known as:  MOBIC        Take 1 Tab by mouth every day.   Dose:  15 mg                        montelukast 10 MG Tabs   Commonly known as:  SINGULAIR        Take 10 mg by mouth every morning.   Dose:  10 mg                        nystatin 458107 UNIT/ML Susp   Commonly known as:  MYCOSTATIN        Take 100,000 Units by mouth 5 Times a Day.   Dose:  792059 Units                        nystatin/triamcinolone 235242-9.1 UNIT/GM-% Crea   Commonly known as:  MYCOLOG        Apply  to affected area(s) 4 times a day.                        omeprazole 20 MG delayed-release capsule   Commonly known as:  PRILOSEC        Take 20 mg by mouth 2 Times a Day.   Dose:  20 mg                        * albuterol 2.5mg/3ml Nebu solution for nebulization   Commonly known as:  PROVENTIL                             * PROAIR  (90 BASE) MCG/ACT Aers inhalation aerosol   Generic drug:  albuterol        Inhale 2 Puffs by mouth every 6 hours as needed. Shortness of breath  Indications: Asthma   Dose:  2 Puff                        STOOL SOFTENER PO        Take  by mouth.                        tramadol 50 MG Tabs   Commonly known as:  ULTRAM        Take 1-2 Tabs by mouth every 6 hours as needed for Mild Pain.   Dose:   mg                          trazodone 50 MG Tabs   Commonly known as:  DESYREL        Take 50 mg by mouth every evening.   Dose:  50 mg                        * Notice:  This list has 2 medication(s) that are the same as other medications prescribed for you. Read the directions carefully, and ask your doctor or other care provider to review them with you.            Procedures and tests performed during your visit     BASIC METABOLIC PANEL    CBC WITH DIFFERENTIAL    CONSENT FOR CONTRAST INJECTION    CT-CTA CHEST PULMONARY ARTERY W/ RECONS    EKG (ER)    ESTIMATED GFR    NPPB    OLD EKG        Discharge Instructions       Your testing here was normal. There is no evidence of pneumonia or clot in her lungs, or any other significant abnormality. No shortness of breath is most likely simply related to your asthma symptoms. Continue home medications, call later this morning to arrange prompt follow-up with her primary care doctor, and return to the emergency department for severe or worsening symptoms.     Shortness of Breath  Shortness of breath means you have trouble breathing. It could also mean that you have a medical problem. You should get immediate medical care for shortness of breath.  CAUSES   · Not enough oxygen in the air such as with high altitudes or a smoke-filled room.  · Certain lung diseases, infections, or problems.  · Heart disease or conditions, such as angina or heart failure.  · Low red blood cells (anemia).  · Poor physical fitness, which can cause shortness of breath when you exercise.  · Chest or back injuries or stiffness.  · Being overweight.  · Smoking.  · Anxiety, which can make you feel like you are not getting enough air.  DIAGNOSIS   Serious medical problems can often be found during your physical exam. Tests may also be done to determine why you are having shortness of breath. Tests may include:  · Chest X-rays.  · Lung function tests.  · Blood tests.  · An electrocardiogram (ECG).  · An  ambulatory electrocardiogram. An ambulatory ECG records your heartbeat patterns over a 24-hour period.  · Exercise testing.  · A transthoracic echocardiogram (TTE). During echocardiography, sound waves are used to evaluate how blood flows through your heart.  · A transesophageal echocardiogram (EVERT).  · Imaging scans.  Your health care provider may not be able to find a cause for your shortness of breath after your exam. In this case, it is important to have a follow-up exam with your health care provider as directed.   TREATMENT   Treatment for shortness of breath depends on the cause of your symptoms and can vary greatly.  HOME CARE INSTRUCTIONS   · Do not smoke. Smoking is a common cause of shortness of breath. If you smoke, ask for help to quit.  · Avoid being around chemicals or things that may bother your breathing, such as paint fumes and dust.  · Rest as needed. Slowly resume your usual activities.  · If medicines were prescribed, take them as directed for the full length of time directed. This includes oxygen and any inhaled medicines.  · Keep all follow-up appointments as directed by your health care provider.  SEEK MEDICAL CARE IF:   · Your condition does not improve in the time expected.  · You have a hard time doing your normal activities even with rest.  · You have any new symptoms.  SEEK IMMEDIATE MEDICAL CARE IF:   · Your shortness of breath gets worse.  · You feel light-headed, faint, or develop a cough not controlled with medicines.  · You start coughing up blood.  · You have pain with breathing.  · You have chest pain or pain in your arms, shoulders, or abdomen.  · You have a fever.  · You are unable to walk up stairs or exercise the way you normally do.  MAKE SURE YOU:  · Understand these instructions.  · Will watch your condition.  · Will get help right away if you are not doing well or get worse.     This information is not intended to replace advice given to you by your health care provider.  Make sure you discuss any questions you have with your health care provider.     Document Released: 09/12/2002 Document Revised: 12/23/2014 Document Reviewed: 03/04/2013  Elsevier Interactive Patient Education ©2016 [employer]            Patient Information     Patient Information    Following emergency treatment: all patient requiring follow-up care must return either to a private physician or a clinic if your condition worsens before you are able to obtain further medical attention, please return to the emergency room.     Billing Information    At [facility], we work to make the billing process streamlined for our patients.  Our Representatives are here to answer any questions you may have regarding your hospital bill.  If you have insurance coverage and have supplied your insurance information to us, we will submit a claim to your insurer on your behalf.  Should you have any questions regarding your bill, we can be reached online or by phone as follows:  Online: You are able pay your bills online or live chat with our representatives about any billing questions you may have. We are here to help Monday - Friday from 8:00am to 7:30pm and 9:00am - 12:00pm on Saturdays.  Please visit [email or web address]  for more information.   Phone:  [phone] or [phone]    Please note that your emergency physician, surgeon, pathologist, radiologist, anesthesiologist, and other specialists are not employed by [facility] and will therefore bill separately for their services.  Please contact them directly for any questions concerning their bills at the numbers below:     Emergency Physician Services:  [phone]  [facility]:  [phone]  [facility]:  [phone]  [facility]:  [phone]    1. Your final bill may vary from the amount quoted upon discharge if all procedures are not complete at that time, or if your doctor has  additional procedures of which we are not aware. You will receive an additional bill if you return to the Emergency Department at [facility] for suture removal regardless of the facility of which the sutures were placed.     2. Please arrange for settlement of this account at the emergency registration.    3. All self-pay accounts are due in full at the time of treatment.  If you are unable to meet this obligation then payment is expected within 4-5 days.     4. If you have had radiology studies (CT, X-ray, Ultrasound, MRI), you have received a preliminary result during your emergency department visit. Please contact the radiology department [phone] to receive a copy of your final result. Please discuss the Final result with your primary physician or with the follow up physician provided.     Crisis Hotline:  [place] Crisis Hotline:  4-089-OSDANMM or 1-685.208.8492  [place] Crisis Hotline:    1-623.554.5468 or 977-603-3169         ED Discharge Follow Up Questions    1. In order to provide you with very good care, we would like to follow up with a phone call in the next few days.  May we have your permission to contact you?     YES /  NO    2. What is the best phone number to call you? (       )_____-__________    3. What is the best time to call you?      Morning  /  Afternoon  /  Evening                   Patient Signature:  ____________________________________________________________    Date:  ____________________________________________________________      Your appointments     Arturo 15, 2017 11:20 AM   Established Patient Pul with JCAK Gilbert   [facility] (--)    [address]  [address]  [place] [id number] [phone]

## 2017-05-04 NOTE — ED AVS SNAPSHOT
5/5/2017    Cyndy Petit  [address] [place] [address]    Dear Cyndy:    [facility] wants to ensure your discharge home is safe and you or your loved ones have had all of your questions answered regarding your care after you leave the hospital.    Below is a list of resources and contact information should you have any questions regarding your hospital stay, follow-up instructions, or active medical symptoms.    Questions or Concerns Regarding… Contact   Medical Questions Related to Your Discharge  (7 days a week, 8am-5pm) Contact a Nurse Care Coordinator   [phone]   Medical Questions Not Related to Your Discharge  (24 hours a day / 7 days a week)  Contact the Nurse Health Line   [phone]    Medications or Discharge Instructions Refer to your discharge packet   or contact your [facility] Primary Care Provider   [phone]   Follow-up Appointment(s) Schedule your appointment via [employer]   or contact Scheduling [phone]   Billing Review your statement via [employer]  or contact Billing [phone]   Medical Records Review your records via [employer]   or contact Medical Records [phone]     You may receive a telephone call within two days of discharge. This call is to make certain you understand your discharge instructions and have the opportunity to have any questions answered. You can also easily access your medical information, test results and upcoming appointments via the [employer] free online health management tool. You can learn more and sign up at [employer]. For assistance setting up your [employer] account, please call [phone].    Once again, we want to ensure your discharge home is safe and that you have a clear understanding of any next steps in your care. If you have any questions or concerns, please do not hesitate to contact us, we are here for you. Thank you for choosing [facility] for your healthcare needs.    Sincerely,    Your [facility] Healthcare Team

## 2017-05-05 ENCOUNTER — APPOINTMENT (OUTPATIENT)
Dept: RADIOLOGY | Facility: MEDICAL CENTER | Age: 72
End: 2017-05-05
Attending: EMERGENCY MEDICINE
Payer: MEDICARE

## 2017-05-05 VITALS
SYSTOLIC BLOOD PRESSURE: 145 MMHG | HEIGHT: 62 IN | TEMPERATURE: 98 F | HEART RATE: 89 BPM | DIASTOLIC BLOOD PRESSURE: 61 MMHG | RESPIRATION RATE: 16 BRPM | BODY MASS INDEX: 45.8 KG/M2 | WEIGHT: 248.9 LBS | OXYGEN SATURATION: 94 %

## 2017-05-05 LAB
ANION GAP SERPL CALC-SCNC: 10 MMOL/L (ref 0–11.9)
BASOPHILS # BLD AUTO: 0.5 % (ref 0–1.8)
BASOPHILS # BLD: 0.05 K/UL (ref 0–0.12)
BUN SERPL-MCNC: 15 MG/DL (ref 8–22)
CALCIUM SERPL-MCNC: 8.8 MG/DL (ref 8.5–10.5)
CHLORIDE SERPL-SCNC: 99 MMOL/L (ref 96–112)
CO2 SERPL-SCNC: 24 MMOL/L (ref 20–33)
CREAT SERPL-MCNC: 0.89 MG/DL (ref 0.5–1.4)
EKG IMPRESSION: NORMAL
EOSINOPHIL # BLD AUTO: 0.26 K/UL (ref 0–0.51)
EOSINOPHIL NFR BLD: 2.6 % (ref 0–6.9)
ERYTHROCYTE [DISTWIDTH] IN BLOOD BY AUTOMATED COUNT: 45.5 FL (ref 35.9–50)
GFR SERPL CREATININE-BSD FRML MDRD: >60 ML/MIN/1.73 M 2
GLUCOSE SERPL-MCNC: 108 MG/DL (ref 65–99)
HCT VFR BLD AUTO: 33.4 % (ref 37–47)
HGB BLD-MCNC: 11.1 G/DL (ref 12–16)
IMM GRANULOCYTES # BLD AUTO: 0.04 K/UL (ref 0–0.11)
IMM GRANULOCYTES NFR BLD AUTO: 0.4 % (ref 0–0.9)
LYMPHOCYTES # BLD AUTO: 1.37 K/UL (ref 1–4.8)
LYMPHOCYTES NFR BLD: 14 % (ref 22–41)
MCH RBC QN AUTO: 30 PG (ref 27–33)
MCHC RBC AUTO-ENTMCNC: 33.2 G/DL (ref 33.6–35)
MCV RBC AUTO: 90.3 FL (ref 81.4–97.8)
MONOCYTES # BLD AUTO: 1.02 K/UL (ref 0–0.85)
MONOCYTES NFR BLD AUTO: 10.4 % (ref 0–13.4)
NEUTROPHILS # BLD AUTO: 7.08 K/UL (ref 2–7.15)
NEUTROPHILS NFR BLD: 72.1 % (ref 44–72)
NRBC # BLD AUTO: 0 K/UL
NRBC BLD AUTO-RTO: 0 /100 WBC
PLATELET # BLD AUTO: 228 K/UL (ref 164–446)
PMV BLD AUTO: 9.7 FL (ref 9–12.9)
POTASSIUM SERPL-SCNC: 3.8 MMOL/L (ref 3.6–5.5)
RBC # BLD AUTO: 3.7 M/UL (ref 4.2–5.4)
SODIUM SERPL-SCNC: 133 MMOL/L (ref 135–145)
WBC # BLD AUTO: 9.8 K/UL (ref 4.8–10.8)

## 2017-05-05 PROCEDURE — 71275 CT ANGIOGRAPHY CHEST: CPT

## 2017-05-05 PROCEDURE — 700117 HCHG RX CONTRAST REV CODE 255: Performed by: EMERGENCY MEDICINE

## 2017-05-05 PROCEDURE — 85025 COMPLETE CBC W/AUTO DIFF WBC: CPT

## 2017-05-05 PROCEDURE — 94640 AIRWAY INHALATION TREATMENT: CPT

## 2017-05-05 PROCEDURE — 80048 BASIC METABOLIC PNL TOTAL CA: CPT

## 2017-05-05 RX ORDER — IPRATROPIUM BROMIDE AND ALBUTEROL SULFATE 2.5; .5 MG/3ML; MG/3ML
3 SOLUTION RESPIRATORY (INHALATION) ONCE
Status: DISCONTINUED | OUTPATIENT
Start: 2017-05-05 | End: 2017-05-05 | Stop reason: HOSPADM

## 2017-05-05 RX ORDER — GUAIFENESIN AND DEXTROMETHORPHAN HYDROBROMIDE 600; 30 MG/1; MG/1
1 TABLET, EXTENDED RELEASE ORAL EVERY 6 HOURS PRN
Qty: 28 TAB | Refills: 0 | Status: SHIPPED | OUTPATIENT
Start: 2017-05-05 | End: 2017-11-14

## 2017-05-05 RX ADMIN — IOHEXOL 75 ML: 350 INJECTION, SOLUTION INTRAVENOUS at 01:42

## 2017-05-05 NOTE — DISCHARGE INSTRUCTIONS
Your testing here was normal. There is no evidence of pneumonia or clot in her lungs, or any other significant abnormality. No shortness of breath is most likely simply related to your asthma symptoms. Continue home medications, call later this morning to arrange prompt follow-up with her primary care doctor, and return to the emergency department for severe or worsening symptoms.     Shortness of Breath  Shortness of breath means you have trouble breathing. It could also mean that you have a medical problem. You should get immediate medical care for shortness of breath.  CAUSES   · Not enough oxygen in the air such as with high altitudes or a smoke-filled room.  · Certain lung diseases, infections, or problems.  · Heart disease or conditions, such as angina or heart failure.  · Low red blood cells (anemia).  · Poor physical fitness, which can cause shortness of breath when you exercise.  · Chest or back injuries or stiffness.  · Being overweight.  · Smoking.  · Anxiety, which can make you feel like you are not getting enough air.  DIAGNOSIS   Serious medical problems can often be found during your physical exam. Tests may also be done to determine why you are having shortness of breath. Tests may include:  · Chest X-rays.  · Lung function tests.  · Blood tests.  · An electrocardiogram (ECG).  · An ambulatory electrocardiogram. An ambulatory ECG records your heartbeat patterns over a 24-hour period.  · Exercise testing.  · A transthoracic echocardiogram (TTE). During echocardiography, sound waves are used to evaluate how blood flows through your heart.  · A transesophageal echocardiogram (EVERT).  · Imaging scans.  Your health care provider may not be able to find a cause for your shortness of breath after your exam. In this case, it is important to have a follow-up exam with your health care provider as directed.   TREATMENT   Treatment for shortness of breath depends on the cause of your symptoms and can vary  greatly.  HOME CARE INSTRUCTIONS   · Do not smoke. Smoking is a common cause of shortness of breath. If you smoke, ask for help to quit.  · Avoid being around chemicals or things that may bother your breathing, such as paint fumes and dust.  · Rest as needed. Slowly resume your usual activities.  · If medicines were prescribed, take them as directed for the full length of time directed. This includes oxygen and any inhaled medicines.  · Keep all follow-up appointments as directed by your health care provider.  SEEK MEDICAL CARE IF:   · Your condition does not improve in the time expected.  · You have a hard time doing your normal activities even with rest.  · You have any new symptoms.  SEEK IMMEDIATE MEDICAL CARE IF:   · Your shortness of breath gets worse.  · You feel light-headed, faint, or develop a cough not controlled with medicines.  · You start coughing up blood.  · You have pain with breathing.  · You have chest pain or pain in your arms, shoulders, or abdomen.  · You have a fever.  · You are unable to walk up stairs or exercise the way you normally do.  MAKE SURE YOU:  · Understand these instructions.  · Will watch your condition.  · Will get help right away if you are not doing well or get worse.     This information is not intended to replace advice given to you by your health care provider. Make sure you discuss any questions you have with your health care provider.     Document Released: 09/12/2002 Document Revised: 12/23/2014 Document Reviewed: 03/04/2013  [employer] Interactive Patient Education ©2016 [employer]

## 2017-05-05 NOTE — ED PROVIDER NOTES
ED Provider Note    Scribed for Yon Barrett M.D. by Yon Barrett. 5/5/2017,  12:17 AM.    CHIEF COMPLAINT  Chief Complaint   Patient presents with   • Shortness of Breath     Today. Per pt, pt is SOB with rest.       HPI  Cyndy Petit is a 71 y.o. female who presents to the Emergency Department for shortness of breath, starting today. She was ambulatory to triage with a steady gait, using her walker. She reports that she was discharged 2 days ago, the 3rd, after right knee surgery. She reports that the last time she had knee surgery she got pneumonia. She is fully alert and oriented, speaks in full complete sentences without obvious increased work of breathing. She cannot identify any obvious exacerbating or relieving symptoms or her subjective shortness of breath. She denies fevers or chills, chest pain, nausea or vomiting. She has normal oxygen saturation at rest. She reports a history of asthma, has been compliant with her medications, but has not had any relief. She reports an associated nonproductive cough. She does have swelling associated with her recent knee surgery, both above and below. Her calf is nontender. She's not been experiencing claudication symptoms, but has not been ambulating very much. She reports her shortness of breath is mild at rest, moderate with exertion, relatively constant. Her orthopedic surgeon was Dr. Rutledge.      REVIEW OF SYSTEMS  See HPI for further details. All other systems are negative.     PAST MEDICAL HISTORY   has a past medical history of GERD (gastroesophageal reflux disease); Asthma; Hypertension; Depression; Snoring; Sleep apnea; Pain; Sorethroat (2/09/2016); GERD (gastroesophageal reflux disease); Obstructive sleep apnea; DJD (degenerative joint disease); Carpal tunnel syndrome; Obesity; Arthritis; Thrush; Cold (4-8-2017); Urinary incontinence; Heart burn; and Hiatus hernia syndrome.    SOCIAL HISTORY  Social History     Social History Main Topics    • Smoking status: Former Smoker -- 1.00 packs/day for 6 years     Types: Cigarettes     Start date: 01/01/1972     Quit date: 01/01/1978   • Smokeless tobacco: Not on file      Comment: 1978   • Alcohol Use: No   • Drug Use: No   • Sexual Activity: Not on file     History   Drug Use No       SURGICAL HISTORY   has past surgical history that includes foraminotomy (11/26/08); hardware removal neuro (11/26/08); other neurological surg (2/2009); cholecystectomy (2010); lumbar laminectomy diskectomy (5/30/2013); lumbar fusion posterior (9/13/2013); lumbar laminectomy diskectomy (9/13/2013); carpal tunnel release (Right, 2000); lumbar fusion posterior (2005); shoulder arthrotomy (Right, 2000); knee arthroscopy (Right, 2008); knee arthroplasty total (Left, 3/7/2016); and knee arthroplasty total (Right, 5/1/2017).    CURRENT MEDICATIONS  Home Medications     Reviewed by Sakina Michaud R.N. (Registered Nurse) on 05/05/17 at 0000  Med List Status: Not Addressed    Medication Last Dose Status    albuterol (PROAIR HFA) 108 (90 BASE) MCG/ACT AERS 5/4/2017 Active    albuterol (PROVENTIL) 2.5mg/3ml Nebu Soln solution for nebulization 5/4/2017 Active    aspirin  MG Tablet Delayed Response 5/4/2017 Active    diphenhydrAMINE (BENADRYL) 50 MG Cap 5/3/2017 Active    Docusate Calcium (STOOL SOFTENER PO) 5/4/2017 Active    escitalopram (LEXAPRO) 20 MG tablet 5/4/2017 Active    fluticasone (FLONASE) 50 MCG/ACT nasal spray 5/4/2017 Active    fluticasone-salmeterol (ADVAIR) 500-50 MCG/DOSE AEPB 5/4/2017 Active    gabapentin (NEURONTIN) 300 MG CAPS 5/4/2017 Active    hydrocodone-acetaminophen (NORCO) 5-325 MG Tab per tablet 5/4/2017 Active    losartan (COZAAR) 100 MG TABS 5/4/2017 Active    meloxicam (MOBIC) 15 MG tablet 5/4/2017 Active    montelukast (SINGULAIR) 10 MG TABS 5/4/2017 Active    nystatin (MYCOSTATIN) 761073 UNIT/ML SUSP 5/4/2017 Active    nystatin/triamcinolone (MYCOLOG) 192451-8.1 UNIT/GM-% Cream 5/4/2017 Active     "omeprazole (PRILOSEC) 20 MG CPDR 5/4/2017 Active    tramadol (ULTRAM) 50 MG Tab not taking Active    trazodone (DESYREL) 50 MG TABS 5/3/2017 Active                ALLERGIES  Allergies   Allergen Reactions   • Allergy      Unknown antibiotic   • Cefepime Hives   • Cephalosporins Hives   • Clarithromycin Hives   • Cleocin [Clindamycin] Itching   • Meropenem    • Morphine Itching   • Sulfamethoxazole-Trimethoprim Itching       PHYSICAL EXAM  VITAL SIGNS: /61 mmHg  Pulse 89  Temp(Src) 36.7 °C (98 °F)  Resp 16  Ht 1.575 m (5' 2\")  Wt 112.9 kg (248 lb 14.4 oz)  BMI 45.51 kg/m2  SpO2 94%  Pulse ox interpretation: I interpret this pulse ox as normal.  Constitutional: Alert in no apparent distress.  HENT: No signs of trauma, Bilateral external ears normal, Nose normal.   Eyes: Pupils are equal and reactive, Conjunctiva normal, Non-icteric.   Neck: Normal range of motion, No tenderness, Supple, No stridor.   Lymphatic: No lymphadenopathy noted.   Cardiovascular: Regular mildly tachycardic rate and rhythm, no murmurs.   Thorax & Lungs: Normal breath sounds, No respiratory distress, No wheezing, No chest tenderness.   Abdomen: Morbidly obese, Bowel sounds normal, Soft, No tenderness, No masses, No pulsatile masses. No peritoneal signs.  Skin: Warm, Dry, No erythema, No rash.   Extremities: Anterior right knee shows a knee replacement surgical scar, she has mild associated postoperative tenderness, significant edema of the extremity distal to the surgical site, mild edema proximally.  Neurologic: Alert , Normal motor function, Normal sensory function, No focal deficits noted.   Psychiatric: Affect normal, Judgment normal, Mood normal.     DIAGNOSTIC STUDIES / PROCEDURES    EKG  Interpreted by me    SINUS RHYTHM rate of 91  BASELINE WANDER IN LEAD(S) II,III,aVF,V1  Compared to ECG 04/17/2017 12:01:36  No significant changes    LABS  Labs Reviewed   CBC WITH DIFFERENTIAL - Abnormal; Notable for the following:     RBC " 3.70 (*)     Hemoglobin 11.1 (*)     Hematocrit 33.4 (*)     MCHC 33.2 (*)     Neutrophils-Polys 72.10 (*)     Lymphocytes 14.00 (*)     Monos (Absolute) 1.02 (*)     All other components within normal limits   BASIC METABOLIC PANEL - Abnormal; Notable for the following:     Sodium 133 (*)     Glucose 108 (*)     All other components within normal limits   ESTIMATED GFR     All labs reviewed by me.    RADIOLOGY  CT-CTA CHEST PULMONARY ARTERY W/ RECONS   Final Result      1.  No evidence of pulmonary emboli.   2.  No significant incidental findings.                 The radiologist's interpretation of all radiological studies have been reviewed by me.    COURSE & MEDICAL DECISION MAKING  Nursing notes, VS, PMSFHx reviewed in chart.     12:17 AM Patient seen and examined at bedside. Differential diagnosis includes but is not limited to asthma, pneumonia, COPD, pulmonary embolism. Given that the patient has been less appetite and usual, is several days after a high risk knee surgery, I think pulmonary embolism needs to be excluded. Her d-dimer is likely to be high given her recent surgery, so is not useful in this case, thus I think a CT pulmonary angiogram is necessary. She reports her symptoms have been refractory to her home asthma medications, and though she's not wheezing, I see no harm in trying a breathing treatment, which I ordered.     1:58 AM I returned to the bedside to discuss with patient that her CT pulmonary angiogram is completely normal, does not show any evidence of clot or infection. She has been resting comfortable without supplemental oxygen with normal vital signs. She has not had a couple episodes of cough that have been productive of yellow phlegm. Think her shortness of breath is secondary to some bronchitis, possibly related to her history of asthma. There is no indication this is a bacterial infection, however. She'll be discharged in stable condition with prescription for Robitussin-DM.      The patient will return for new or worsening symptoms and is stable at the time of discharge.    The patient is referred to a primary physician for blood pressure management, diabetic screening, and for all other preventative health concerns.    DISPOSITION:  Patient will be discharged home in stable condition.    FOLLOW UP:  No follow-up provider specified.    OUTPATIENT MEDICATIONS:  Discharge Medication List as of 5/5/2017  2:26 AM              FINAL IMPRESSION  1. Shortness of breath

## 2017-05-05 NOTE — ED NOTES
"Cyndy Annemarie Petit  71 y.o. female  Chief Complaint   Patient presents with   • Shortness of Breath     Today. Per pt, pt is SOB with rest.     Pt amb to triage with steady gait with the use of her walker. Per pt, pt was released yesterday 5/3 s/p right knee surgery.   Pt states, \"Last time I got surgery on my left knee I got pneumonia.\" Pt is alert and oriented and speaking in full sentences, follows commands and responds appropriately to questions  Pt placed in senior lounge. Pt educated on triage process. Pt encouraged to alert staff for any changes.    "

## 2017-06-15 ENCOUNTER — OFFICE VISIT (OUTPATIENT)
Dept: PULMONOLOGY | Facility: HOSPICE | Age: 72
End: 2017-06-15
Payer: MEDICARE

## 2017-06-15 VITALS
HEIGHT: 62 IN | RESPIRATION RATE: 15 BRPM | OXYGEN SATURATION: 96 % | DIASTOLIC BLOOD PRESSURE: 60 MMHG | SYSTOLIC BLOOD PRESSURE: 140 MMHG | HEART RATE: 84 BPM

## 2017-06-15 DIAGNOSIS — G47.33 OSA (OBSTRUCTIVE SLEEP APNEA): ICD-10-CM

## 2017-06-15 DIAGNOSIS — J45.40 MODERATE PERSISTENT ASTHMA WITHOUT COMPLICATION: ICD-10-CM

## 2017-06-15 DIAGNOSIS — I10 ESSENTIAL HYPERTENSION: ICD-10-CM

## 2017-06-15 DIAGNOSIS — J30.2 SEASONAL ALLERGIC RHINITIS, UNSPECIFIED ALLERGIC RHINITIS TRIGGER: ICD-10-CM

## 2017-06-15 PROCEDURE — 99214 OFFICE O/P EST MOD 30 MIN: CPT | Performed by: NURSE PRACTITIONER

## 2017-06-15 NOTE — PATIENT INSTRUCTIONS
1. Continue CPAP nightly, decrease pressure to 14 cm H2O.  2. Order for new mask, mask fitting and supplies to preferred healthcare  3. Follow up in 2 months with compliance download, sooner if needed  4. Follow with PCP regularly  5. Continue Advair 500/50 twice a day and albuterol as needed

## 2017-06-15 NOTE — MR AVS SNAPSHOT
"        Cyndy Petit   6/15/2017 11:20 AM   Office Visit   MRN: [id number]    Department:  [facility]   Dept Phone:  [phone]    Description:  Female : 1945   Provider:  JACK Gilbert           Reason for Visit     Follow-Up 2M and DL      Allergies as of 6/15/2017     Allergen Noted Reactions    Allergy 2013       Unknown antibiotic    Cefepime 2010   Hives    Cephalosporins 2010   Hives    Clarithromycin 2010   Hives    Cleocin [Clindamycin] 2016   Itching    Meropenem 2013       Morphine 2013   Itching    Sulfamethoxazole-Trimethoprim 2013   Itching      Vital Signs     Blood Pressure Pulse Respirations Height Oxygen Saturation Smoking Status    140/60 mmHg 84 15 1.575 m (5' 2\") 96% Former Smoker      Basic Information     Date Of Birth Sex Race Ethnicity Preferred Language    1945 Female White Non- English      Your appointments     Aug 21, 2017 10:20 AM   Established Patient Pul with JACK Gilbert   [facility] (--)    [address]  [address]  [facility] [id number] [phone]              Problem List              ICD-10-CM Priority Class Noted - Resolved    Back pain M54.9   2013 - Present    Hyponatremia E87.1 Low  2013 - Present    GERD (gastroesophageal reflux disease) K21.9 Low  2013 - Present    HTN (hypertension) I10 Medium  2013 - Present    S/P laminectomy Z98.890 High  2013 - Present    Lumbosacral radiculopathy at S1 M54.17 High  2013 - Present    Hyperglycemia R73.9 Medium  2013 - Present    Primary osteoarthritis of left knee M17.12   3/7/2016 - Present    Hypokalemia E87.6   3/10/2016 - Present    H/O knee surgery Z98.890   3/10/2016 - Present    Pain and swelling of left knee M25.562, M25.462   3/10/2016 - Present    Moderate persistent asthma without complication J45.40   2016 - Present    BEATRIZ (obstructive " sleep apnea) G47.33   12/6/2016 - Present    Seasonal allergies J30.2   12/6/2016 - Present      Health Maintenance        Date Due Completion Dates    IMM DTaP/Tdap/Td Vaccine (1 - Tdap) 11/25/1964 ---    PAP SMEAR 11/25/1966 ---    COLONOSCOPY 11/25/1995 ---    IMM ZOSTER VACCINE 11/25/2005 ---    IMM PNEUMOCOCCAL 65+ (ADULT) LOW/MEDIUM RISK SERIES (2 of 2 - PPSV23) 10/1/2016 10/1/2015, 1/1/2009    MAMMOGRAM 3/15/2018 3/15/2017, 12/10/2015, 8/18/2014, 8/14/2013, 7/30/2012, 7/21/2011, 7/20/2010, 7/20/2010, 2/11/2008, 2/11/2008, 1/19/2007, 1/18/2006    BONE DENSITY 3/15/2022 3/15/2017, 8/18/2014, 8/26/2010, 1/19/2007            Current Immunizations     13-VALENT PCV PREVNAR 10/1/2015    Influenza TIV (IM) 10/1/2016, 10/1/2015, 9/11/2013  4:37 AM    Pneumococcal polysaccharide vaccine (PPSV-23) 1/1/2009      Below and/or attached are the medications your provider expects you to take. Review all of your home medications and newly ordered medications with your provider and/or pharmacist. Follow medication instructions as directed by your provider and/or pharmacist. Please keep your medication list with you and share with your provider. Update the information when medications are discontinued, doses are changed, or new medications (including over-the-counter products) are added; and carry medication information at all times in the event of emergency situations     Allergies:  ALLERGY - (reactions not documented)     CEFEPIME - Hives     CEPHALOSPORINS - Hives     CLARITHROMYCIN - Hives     CLEOCIN - Itching     MEROPENEM - (reactions not documented)     MORPHINE - Itching     SULFAMETHOXAZOLE-TRIMETHOPRIM - Itching               Medications  Valid as of: Kelly 15, 2017 - 11:50 AM    Generic Name Brand Name Tablet Size Instructions for use    Albuterol Sulfate (Aero Soln) albuterol 108 (90 BASE) MCG/ACT Inhale 2 Puffs by mouth every 6 hours as needed. Shortness of breath  Indications: Asthma        Albuterol Sulfate  (Nebu Soln) PROVENTIL 2.5mg/3ml         Dextromethorphan-Guaifenesin (TABLET SR 12 HR) MUCINEX DM  MG Take 1 Tab by mouth every 6 hours as needed.        DiphenhydrAMINE HCl (Cap) BENADRYL 50 MG Take 50 mg by mouth at bedtime as needed.        Docusate Calcium   Take  by mouth.        Escitalopram Oxalate (Tab) LEXAPRO 20 MG         Fluticasone Propionate (Suspension) FLONASE 50 MCG/ACT Spray 1 Spray in nose every morning. Each Nostril        Fluticasone-Salmeterol (AEROSOL POWDER, BREATH ACTIVATED) ADVAIR 500-50 MCG/DOSE Inhale 1 Puff by mouth every 12 hours.        Gabapentin (Cap) NEURONTIN 300 MG Take 1 Cap by mouth 3 times a day.        Hydrocodone-Acetaminophen (Tab) NORCO 5-325 MG Take 1-2 Tabs by mouth every 6 hours as needed (As needed for breakthrough pain. Max 8 tabs daily. Wean off pain medications as tolerated. Do not drive while taking. No ETOH.).        Losartan Potassium (Tab) COZAAR 100 MG Take 100 mg by mouth every day.        Meloxicam (Tab) MOBIC 15 MG Take 1 Tab by mouth every day.        Montelukast Sodium (Tab) SINGULAIR 10 MG Take 10 mg by mouth every morning.        Nystatin (Suspension) MYCOSTATIN 370262 UNIT/ML Take 100,000 Units by mouth 5 Times a Day.        Nystatin-Triamcinolone (Cream) MYCOLOG 730873-1.1 UNIT/GM-% Apply  to affected area(s) 4 times a day.        Omeprazole (CAPSULE DELAYED RELEASE) PRILOSEC 20 MG Take 20 mg by mouth 2 Times a Day.        TraMADol HCl (Tab) ULTRAM 50 MG Take 1-2 Tabs by mouth every 6 hours as needed for Mild Pain.        TraZODone HCl (Tab) DESYREL 50 MG Take 50 mg by mouth every evening.        .                 Medicines prescribed today were sent to:     [facility] [address] [place] [address] - [address] [address]    Phone: [phone] Fax: [phone]    Open 24 Hours?: No      Medication refill instructions:       If your prescription bottle indicates you have medication refills left, it is not  necessary to call your provider’s office. Please contact your pharmacy and they will refill your medication.    If your prescription bottle indicates you do not have any refills left, you may request refills at any time through one of the following ways: The online [employer] system (except Urgent Care), by calling your provider’s office, or by asking your pharmacy to contact your provider’s office with a refill request. Medication refills are processed only during regular business hours and may not be available until the next business day. Your provider may request additional information or to have a follow-up visit with you prior to refilling your medication.   *Please Note: Medication refills are assigned a new Rx number when refilled electronically. Your pharmacy may indicate that no refills were authorized even though a new prescription for the same medication is available at the pharmacy. Please request the medicine by name with the pharmacy before contacting your provider for a refill.           [employer] Access Code: Activation code not generated  Current [employer] Status: Active

## 2017-06-15 NOTE — PROGRESS NOTES
"Chief Complaint   Patient presents with   • Follow-Up     2M and DL         HPI: This patient is a 71 y.o. female, who presents for follow-up of BEATRIZ. Polysomnogram indicates AHI 12.4 and minimum saturation 84%. She underwent recent titration study due to elevated AHI. On CPAP 16 cm she achieved 2 minutes of REM sleep, AHI was reduced to 3.2, and basal saturation was 93%. Her pressure was increased at her last visit. She reports today she is having a difficult time tolerating pressure. Compliance report over the past 30 days shows 100% use, but only 2 hours of usage per night, she has a significant large air leak, AHI has been reduced to 3.5.    Patient has a history of asthma, PFTs from 6/16/15 indicate FEV1 2.4 L, 109% predicted, FEV1/FVC 76%, FEF 25-75% 99% predicted, and DLCO 150% predicted. The patient is compliant with Advair 500/50 µg twice daily, and albuterol HFA as needed. She has significant seasonal allergies, well controlled with Singulair and Flonase. Breathing is stable. She denies respiratory complaints. She has had a right knee replacement since her last visit it is healing well. No other changes to her health.    Past Medical History   Diagnosis Date   • GERD (gastroesophageal reflux disease)    • Asthma    • Hypertension    • Depression    • Snoring    • Sleep apnea      CPAP   • Pain      back, knees   • Sorethroat 2/09/2016   • GERD (gastroesophageal reflux disease)    • Obstructive sleep apnea    • DJD (degenerative joint disease)    • Carpal tunnel syndrome    • Obesity    • Arthritis    • Thrush      from \"Advair\", takes nystatin   • Cold 4-8-2017     cold; taking antibiotics  4-   • Urinary incontinence    • Heart burn    • Hiatus hernia syndrome        Social History   Substance Use Topics   • Smoking status: Former Smoker -- 1.00 packs/day for 6 years     Types: Cigarettes     Start date: 01/01/1972     Quit date: 01/01/1978   • Smokeless tobacco: None      Comment: 1978   • Alcohol " Use: No       Family History   Problem Relation Age of Onset   • Heart Disease     • Hypertension     • Stroke     • Lung Disease     • Heart Disease Father    • Hypertension Mother    • Cancer Mother    • Heart Disease Mother        Current medications as of today   Current Outpatient Prescriptions   Medication Sig Dispense Refill   • Dextromethorphan-Guaifenesin (MUCINEX DM)  MG TABLET SR 12 HR Take 1 Tab by mouth every 6 hours as needed. 28 Tab 0   • meloxicam (MOBIC) 15 MG tablet Take 1 Tab by mouth every day. 30 Tab 0   • Docusate Calcium (STOOL SOFTENER PO) Take  by mouth.     • diphenhydrAMINE (BENADRYL) 50 MG Cap Take 50 mg by mouth at bedtime as needed.     • nystatin (MYCOSTATIN) 048641 UNIT/ML SUSP Take 100,000 Units by mouth 5 Times a Day.     • omeprazole (PRILOSEC) 20 MG CPDR Take 20 mg by mouth 2 Times a Day.     • hydrocodone-acetaminophen (NORCO) 5-325 MG Tab per tablet Take 1-2 Tabs by mouth every 6 hours as needed (As needed for breakthrough pain. Max 8 tabs daily. Wean off pain medications as tolerated. Do not drive while taking. No ETOH.). 60 Tab 0   • tramadol (ULTRAM) 50 MG Tab Take 1-2 Tabs by mouth every 6 hours as needed for Mild Pain. 50 Tab 0   • nystatin/triamcinolone (MYCOLOG) 934619-0.1 UNIT/GM-% Cream Apply  to affected area(s) 4 times a day.     • escitalopram (LEXAPRO) 20 MG tablet      • albuterol (PROVENTIL) 2.5mg/3ml Nebu Soln solution for nebulization      • gabapentin (NEURONTIN) 300 MG CAPS Take 1 Cap by mouth 3 times a day. 90 Cap 0   • fluticasone-salmeterol (ADVAIR) 500-50 MCG/DOSE AEPB Inhale 1 Puff by mouth every 12 hours.     • albuterol (PROAIR HFA) 108 (90 BASE) MCG/ACT AERS Inhale 2 Puffs by mouth every 6 hours as needed. Shortness of breath  Indications: Asthma     • losartan (COZAAR) 100 MG TABS Take 100 mg by mouth every day.     • montelukast (SINGULAIR) 10 MG TABS Take 10 mg by mouth every morning.     • trazodone (DESYREL) 50 MG TABS Take 50 mg by mouth  "every evening.     • fluticasone (FLONASE) 50 MCG/ACT nasal spray Spray 1 Spray in nose every morning. Each Nostril       No current facility-administered medications for this visit.       Allergies: Allergy; Cefepime; Cephalosporins; Clarithromycin; Cleocin; Meropenem; Morphine; and Sulfamethoxazole-trimethoprim    Blood pressure 140/60, pulse 84, resp. rate 15, height 1.575 m (5' 2\"), SpO2 96 %.      ROS:   Constitutional: Denies fevers, chills, night sweats, weight loss. Positive fatigue.  HEENT: Denies earache, difficulty hearing, tinnitus, nasal congestion, hoarseness  Cardiovascular: Denies chest pain, tightness, palpitations, orthopnea or edema  Respiratory: See HPI  Sleep: Frequent nocturnal awakening, daytime hypersomnolence  GI: Denies heartburn, dysphagia, nausea, abdominal pain, diarrhea or constipation  : Denies frequent urination, hematuria, discharge or painful urination  Musculoskeletal: Positive chronic back pain  Neurological: Denies weakness or headaches  Skin: No rashes    Physical exam:   Appearance: Obese, in no acute distress  HEENT: Normocephalic, atraumatic, white sclera, PERRLA, oropharynx clear  Respiratory: no intercostal retractions or accessory muscle use   Lungs auscultation: Clear to auscultation bilaterally  Cardiovascular: Regular rate rhythm no murmurs, rubs or gallops  Gait: Normal  Digits: No clubbing, cyanosis  Motor: No focal deficits  Orientation: Oriented to time, person and place    Diagnosis:  1. Essential hypertension     2. Moderate persistent asthma without complication     3. BEATRIZ (obstructive sleep apnea)  DME MASK AND SUPPLIES    DME OTHER   4. Seasonal allergic rhinitis, unspecified allergic rhinitis trigger         Plan:  1. Continue CPAP nightly, decrease pressure to 14 cm H2O.  2. Order for new mask, mask fitting and supplies to preferred healthcare  3. Follow up in 2 months with compliance download, sooner if needed  4. Follow with PCP regularly  5. Continue " Advair 500/50 twice a day and albuterol as needed

## 2017-07-31 ENCOUNTER — PATIENT OUTREACH (OUTPATIENT)
Dept: HEALTH INFORMATION MANAGEMENT | Facility: OTHER | Age: 72
End: 2017-07-31

## 2017-08-01 NOTE — PROGRESS NOTES
Cyndy Petit was discharged from [facility] on 5/3 with a low lace score of 9 with a diagnoses of Total knee Arthroplasty. [facility]’s patient advocate successfully engaged with Mrs. Petit several times after her discharge home and ensured that all her medications were picked up upon discharge and confirmed that she attended follow up appointments with her Surgeon Dr. Rutledge on 5/10 appointment was completed. Patient was also discharged with orders for DME per patient she had all DME already from previous surgery.

## 2017-08-05 ENCOUNTER — HOSPITAL ENCOUNTER (OUTPATIENT)
Dept: RADIOLOGY | Facility: MEDICAL CENTER | Age: 72
End: 2017-08-05
Attending: ORTHOPAEDIC SURGERY
Payer: MEDICARE

## 2017-08-05 DIAGNOSIS — M54.16 LUMBAR RADICULOPATHY: ICD-10-CM

## 2017-08-08 ENCOUNTER — HOSPITAL ENCOUNTER (OUTPATIENT)
Dept: RADIOLOGY | Facility: MEDICAL CENTER | Age: 72
End: 2017-08-08
Attending: ORTHOPAEDIC SURGERY
Payer: MEDICARE

## 2017-08-08 PROCEDURE — 72148 MRI LUMBAR SPINE W/O DYE: CPT

## 2017-08-21 ENCOUNTER — OFFICE VISIT (OUTPATIENT)
Dept: PULMONOLOGY | Facility: HOSPICE | Age: 72
End: 2017-08-21
Payer: MEDICARE

## 2017-08-21 VITALS
DIASTOLIC BLOOD PRESSURE: 70 MMHG | HEART RATE: 74 BPM | SYSTOLIC BLOOD PRESSURE: 130 MMHG | BODY MASS INDEX: 43.43 KG/M2 | RESPIRATION RATE: 15 BRPM | HEIGHT: 62 IN | WEIGHT: 236 LBS | OXYGEN SATURATION: 93 %

## 2017-08-21 DIAGNOSIS — G47.33 OSA (OBSTRUCTIVE SLEEP APNEA): ICD-10-CM

## 2017-08-21 DIAGNOSIS — J45.40 MODERATE PERSISTENT ASTHMA WITHOUT COMPLICATION: ICD-10-CM

## 2017-08-21 DIAGNOSIS — I10 ESSENTIAL HYPERTENSION: ICD-10-CM

## 2017-08-21 PROCEDURE — 99213 OFFICE O/P EST LOW 20 MIN: CPT | Performed by: NURSE PRACTITIONER

## 2017-08-21 RX ORDER — DULOXETIN HYDROCHLORIDE 60 MG/1
60 CAPSULE, DELAYED RELEASE ORAL DAILY
COMMUNITY

## 2017-08-21 NOTE — MR AVS SNAPSHOT
"Kerriesage Sharpe Marisabel   2017 10:20 AM   Office Visit   MRN: [id number]    Department:  [facility]   Dept Phone:  [phone]    Description:  Female : 1945   Provider:  JACK Gilbert           Reason for Visit     Follow-Up 2 Month / Chip DL      Allergies as of 2017     Allergen Noted Reactions    Allergy 2013       Unknown antibiotic    Cefepime 2010   Hives    Cephalosporins 2010   Hives    Clarithromycin 2010   Hives    Cleocin [Clindamycin] 2016   Itching    Meropenem 2013       Morphine 2013   Itching    Sulfamethoxazole-Trimethoprim 2013   Itching      You were diagnosed with     Essential hypertension   [8636827]       BEATRIZ (obstructive sleep apnea)   [590130]       Moderate persistent asthma without complication   [365167]         Vital Signs     Blood Pressure Pulse Respirations Height Weight Body Mass Index    130/70 mmHg 74 15 1.575 m (5' 2.01\") 107.049 kg (236 lb) 43.15 kg/m2    Oxygen Saturation Smoking Status                93% Former Smoker          Basic Information     Date Of Birth Sex Race Ethnicity Preferred Language    1945 Female White Non- English      Problem List              ICD-10-CM Priority Class Noted - Resolved    Back pain M54.9   2013 - Present    Hyponatremia E87.1 Low  2013 - Present    GERD (gastroesophageal reflux disease) K21.9 Low  2013 - Present    HTN (hypertension) I10 Medium  2013 - Present    S/P laminectomy Z98.890 High  2013 - Present    Lumbosacral radiculopathy at S1 M54.17 High  2013 - Present    Hyperglycemia R73.9 Medium  2013 - Present    Primary osteoarthritis of left knee M17.12   3/7/2016 - Present    Hypokalemia E87.6   3/10/2016 - Present    H/O knee surgery Z98.890   3/10/2016 - Present    Pain and swelling of left knee M25.562, M25.462   3/10/2016 - Present    Moderate persistent asthma without complication J45.40  "  12/6/2016 - Present    BEATRIZ (obstructive sleep apnea) G47.33   12/6/2016 - Present    Seasonal allergies J30.2   12/6/2016 - Present      Health Maintenance        Date Due Completion Dates    IMM DTaP/Tdap/Td Vaccine (1 - Tdap) 11/25/1964 ---    PAP SMEAR 11/25/1966 ---    COLONOSCOPY 11/25/1995 ---    IMM ZOSTER VACCINE 11/25/2005 ---    IMM PNEUMOCOCCAL 65+ (ADULT) LOW/MEDIUM RISK SERIES (2 of 2 - PPSV23) 10/1/2016 10/1/2015, 1/1/2009    IMM INFLUENZA (1) 9/1/2017 10/1/2016, 10/1/2015, 9/11/2013    MAMMOGRAM 3/15/2018 3/15/2017, 12/10/2015, 8/18/2014, 8/14/2013, 7/30/2012, 7/21/2011, 7/20/2010, 7/20/2010, 2/11/2008, 2/11/2008, 1/19/2007, 1/18/2006    BONE DENSITY 3/15/2022 3/15/2017, 8/18/2014, 8/26/2010, 1/19/2007            Current Immunizations     13-VALENT PCV PREVNAR 10/1/2015    Influenza TIV (IM) 10/1/2016, 10/1/2015, 9/11/2013  4:37 AM    Pneumococcal polysaccharide vaccine (PPSV-23) 1/1/2009      Below and/or attached are the medications your provider expects you to take. Review all of your home medications and newly ordered medications with your provider and/or pharmacist. Follow medication instructions as directed by your provider and/or pharmacist. Please keep your medication list with you and share with your provider. Update the information when medications are discontinued, doses are changed, or new medications (including over-the-counter products) are added; and carry medication information at all times in the event of emergency situations     Allergies:  ALLERGY - (reactions not documented)     CEFEPIME - Hives     CEPHALOSPORINS - Hives     CLARITHROMYCIN - Hives     CLEOCIN - Itching     MEROPENEM - (reactions not documented)     MORPHINE - Itching     SULFAMETHOXAZOLE-TRIMETHOPRIM - Itching               Medications  Valid as of: August 21, 2017 - 10:48 AM    Generic Name Brand Name Tablet Size Instructions for use    Albuterol Sulfate (Aero Soln) albuterol 108 (90 Base) MCG/ACT Inhale 2 Puffs  by mouth every 6 hours as needed. Shortness of breath  Indications: Asthma        Albuterol Sulfate (Nebu Soln) PROVENTIL 2.5mg/3ml         Dextromethorphan-Guaifenesin (TABLET SR 12 HR) MUCINEX DM  MG Take 1 Tab by mouth every 6 hours as needed.        DiphenhydrAMINE HCl (Cap) BENADRYL 50 MG Take 50 mg by mouth at bedtime as needed.        Docusate Calcium   Take  by mouth.        DULoxetine HCl (Cap DR Particles) CYMBALTA 60 MG Take 60 mg by mouth every day.        Fluticasone Propionate (Suspension) FLONASE 50 MCG/ACT Spray 1 Spray in nose every morning. Each Nostril        Fluticasone-Salmeterol (AEROSOL POWDER, BREATH ACTIVATED) ADVAIR 500-50 MCG/DOSE Inhale 1 Puff by mouth every 12 hours.        Gabapentin (Cap) NEURONTIN 300 MG Take 1 Cap by mouth 3 times a day.        Hydrocodone-Acetaminophen (Tab) NORCO 5-325 MG Take 1-2 Tabs by mouth every 6 hours as needed (As needed for breakthrough pain. Max 8 tabs daily. Wean off pain medications as tolerated. Do not drive while taking. No ETOH.).        Losartan Potassium (Tab) COZAAR 100 MG Take 100 mg by mouth every day.        Meloxicam (Tab) MOBIC 15 MG Take 1 Tab by mouth every day.        Montelukast Sodium (Tab) SINGULAIR 10 MG Take 10 mg by mouth every morning.        Nystatin (Suspension) MYCOSTATIN 261254 UNIT/ML Take 100,000 Units by mouth 5 Times a Day.        Nystatin-Triamcinolone (Cream) MYCOLOG 888496-4.1 UNIT/GM-% Apply  to affected area(s) 4 times a day.        Omeprazole (CAPSULE DELAYED RELEASE) PRILOSEC 20 MG Take 20 mg by mouth 2 Times a Day.        TraZODone HCl (Tab) DESYREL 50 MG Take 50 mg by mouth every evening.        .                 Medicines prescribed today were sent to:     [facility] [address] [place] [address] [place] - [address] [address]    Phone: [phone] Fax: [phone]    Open 24 Hours?: No      Medication refill instructions:       If your prescription bottle indicates you have  medication refills left, it is not necessary to call your provider’s office. Please contact your pharmacy and they will refill your medication.    If your prescription bottle indicates you do not have any refills left, you may request refills at any time through one of the following ways: The online [employer] system (except Urgent Care), by calling your provider’s office, or by asking your pharmacy to contact your provider’s office with a refill request. Medication refills are processed only during regular business hours and may not be available until the next business day. Your provider may request additional information or to have a follow-up visit with you prior to refilling your medication.   *Please Note: Medication refills are assigned a new Rx number when refilled electronically. Your pharmacy may indicate that no refills were authorized even though a new prescription for the same medication is available at the pharmacy. Please request the medicine by name with the pharmacy before contacting your provider for a refill.           [employer] Access Code: Activation code not generated  Current [employer] Status: Active

## 2017-08-21 NOTE — PROGRESS NOTES
"Chief Complaint   Patient presents with   • Follow-Up     2 Month / Chip DL         HPI: This patient is a 71 y.o. female, who presents for two-month follow-up BEATRIZ.     Polysomnogram indicates AHI 12.4 and minimum saturation 84%. She underwent recent titration study due to elevated AHI. On CPAP 16 cm she achieved 2 minutes of REM sleep, AHI was reduced to 3.2, and basal saturation was 93%. Her pressure was increased after her titration, but decreased to 14 cm H2O after her last visit in June due to tolerance. Compliance report today 100% compliance of the past 30 days, average use of 6 hours 12 minutes per night, AHI of 2.5. Her mass still leaks approximately 2 hours out of 8. This will occasionally disturb her sleep. Overall she feels she benefits from therapy. Denies hypersomnolence or a.m. headaches.    Patient has a history of asthma, PFTs from 6/16/15 indicate FEV1 2.4 L, 109% predicted, FEV1/FVC 76%, FEF 25-75% 99% predicted, and DLCO 150% predicted. The patient is compliant with Advair 500/50 µg twice daily, and albuterol HFA as needed. She has significant seasonal allergies, well controlled with Singulair and Flonase. She took any antibiotic for sinusitis a couple of weeks ago. She feels her symptoms are returning. She seen ENT in the past and was told she may need surgery. I encouraged her to follow-up with ENT regarding this.    Past Medical History   Diagnosis Date   • GERD (gastroesophageal reflux disease)    • Asthma    • Hypertension    • Depression    • Snoring    • Sleep apnea      CPAP   • Pain      back, knees   • Sorethroat 2/09/2016   • GERD (gastroesophageal reflux disease)    • Obstructive sleep apnea    • DJD (degenerative joint disease)    • Carpal tunnel syndrome    • Obesity    • Arthritis    • Thrush      from \"Advair\", takes nystatin   • Cold 4-8-2017     cold; taking antibiotics  4-   • Urinary incontinence    • Heart burn    • Hiatus hernia syndrome        Social History "   Substance Use Topics   • Smoking status: Former Smoker -- 1.00 packs/day for 6 years     Types: Cigarettes     Start date: 01/01/1972     Quit date: 01/01/1978   • Smokeless tobacco: None      Comment: 1978   • Alcohol Use: No       Family History   Problem Relation Age of Onset   • Heart Disease     • Hypertension     • Stroke     • Lung Disease     • Heart Disease Father    • Hypertension Mother    • Cancer Mother    • Heart Disease Mother        Current medications as of today   Current Outpatient Prescriptions   Medication Sig Dispense Refill   • duloxetine (CYMBALTA) 60 MG Cap DR Particles delayed-release capsule Take 60 mg by mouth every day.     • hydrocodone-acetaminophen (NORCO) 5-325 MG Tab per tablet Take 1-2 Tabs by mouth every 6 hours as needed (As needed for breakthrough pain. Max 8 tabs daily. Wean off pain medications as tolerated. Do not drive while taking. No ETOH.). 60 Tab 0   • nystatin/triamcinolone (MYCOLOG) 079447-2.1 UNIT/GM-% Cream Apply  to affected area(s) 4 times a day.     • albuterol (PROVENTIL) 2.5mg/3ml Nebu Soln solution for nebulization      • gabapentin (NEURONTIN) 300 MG CAPS Take 1 Cap by mouth 3 times a day. 90 Cap 0   • fluticasone-salmeterol (ADVAIR) 500-50 MCG/DOSE AEPB Inhale 1 Puff by mouth every 12 hours.     • albuterol (PROAIR HFA) 108 (90 BASE) MCG/ACT AERS Inhale 2 Puffs by mouth every 6 hours as needed. Shortness of breath  Indications: Asthma     • losartan (COZAAR) 100 MG TABS Take 100 mg by mouth every day.     • nystatin (MYCOSTATIN) 259957 UNIT/ML SUSP Take 100,000 Units by mouth 5 Times a Day.     • montelukast (SINGULAIR) 10 MG TABS Take 10 mg by mouth every morning.     • trazodone (DESYREL) 50 MG TABS Take 50 mg by mouth every evening.     • fluticasone (FLONASE) 50 MCG/ACT nasal spray Spray 1 Spray in nose every morning. Each Nostril     • omeprazole (PRILOSEC) 20 MG CPDR Take 20 mg by mouth 2 Times a Day.     • Dextromethorphan-Guaifenesin (MUCINEX DM)  " MG TABLET SR 12 HR Take 1 Tab by mouth every 6 hours as needed. 28 Tab 0   • meloxicam (MOBIC) 15 MG tablet Take 1 Tab by mouth every day. 30 Tab 0   • Docusate Calcium (STOOL SOFTENER PO) Take  by mouth.     • diphenhydrAMINE (BENADRYL) 50 MG Cap Take 50 mg by mouth at bedtime as needed.       No current facility-administered medications for this visit.       Allergies: Allergy; Cefepime; Cephalosporins; Clarithromycin; Cleocin; Meropenem; Morphine; and Sulfamethoxazole-trimethoprim    Blood pressure 130/70, pulse 74, resp. rate 15, height 1.575 m (5' 2.01\"), weight 107.049 kg (236 lb), SpO2 93 %.      ROS:   Constitutional: Denies fevers, chills, night sweats, weight loss or fatigue  Eyes: Denies pain, discharge/drainage  ENT: See history of present illness  Allergic: Denies Allergic rhinitis or hayfever  Respiratory: See HPI  Cardiovascular: Denies chest pain, tightness, palpitations, orthopnea or edema  Sleep: Denies snoring, resuscitative gasps, frequent nocturnal awakenings, insomnia  GI/: Denies heartburn, nausea, vomiting, urinary incontinence, hematuria  Musculoskeletal: Denies back pain, painful joints, sore muscles  Neurological: Denies vertigo or headaches  Skin: Denies rashes, lesions  Psychiatric: Denies depression or anxiety      Physical exam:   Constitutional: Obese, in no acute distress  Eyes: PERRLA  Mouth/Throat: Oropharynx is moist, clear, no lesions  Neck: supple, no masses  Respiratory: no intercostal retractions or accessory muscle use   Lungs auscultation: Clear to auscultation bilaterally  Cardiovascular: Regular rate rhythm no murmurs, rubs or gallops  Skin: No rashes or lesions  Neuro: No focal deficit, cranial nerves grossly intact  Psychiatric: Oriented to time, person and place.     Diagnosis:  1. Essential hypertension     2. BEATRIZ (obstructive sleep apnea)     3. Moderate persistent asthma without complication         Plan:    1. Continue CPAP 14 cm H2O nightly  2. Clean mask " and tubing weekly  3. Follow up with DME for new mask  4. Influenza vaccine recommended in October  5. Follow-up in 6-12 months, sooner if needed

## 2017-08-21 NOTE — PATIENT INSTRUCTIONS
1. Continue CPAP nightly  2. Clean mask and tubing weekly  3. Follow up with DME for new mask  4. Influenza vaccine recommended in October  5. Follow-up in 6-12 months, sooner if needed

## 2017-10-05 ENCOUNTER — HOSPITAL ENCOUNTER (OUTPATIENT)
Dept: RADIOLOGY | Facility: MEDICAL CENTER | Age: 72
End: 2017-10-05
Attending: OTOLARYNGOLOGY
Payer: MEDICARE

## 2017-10-05 DIAGNOSIS — J32.2 CHRONIC ETHMOIDAL SINUSITIS: ICD-10-CM

## 2017-10-05 DIAGNOSIS — J32.0 CHRONIC MAXILLARY SINUSITIS: ICD-10-CM

## 2017-10-05 PROCEDURE — 70486 CT MAXILLOFACIAL W/O DYE: CPT

## 2017-10-19 NOTE — ADDENDUM NOTE
Encounter addended by: Melina Ramirez on: 10/19/2017 10:30 AM<BR>    Actions taken: Flowsheet accepted

## 2017-11-07 ENCOUNTER — HOSPITAL ENCOUNTER (OUTPATIENT)
Dept: RADIOLOGY | Facility: MEDICAL CENTER | Age: 72
End: 2017-11-07
Attending: FAMILY MEDICINE
Payer: MEDICARE

## 2017-11-07 DIAGNOSIS — T78.49XA: ICD-10-CM

## 2017-11-07 PROCEDURE — 71020 DX-CHEST-2 VIEWS: CPT

## 2017-11-13 ENCOUNTER — HOSPITAL ENCOUNTER (OUTPATIENT)
Dept: RADIOLOGY | Facility: REHABILITATION | Age: 72
End: 2017-11-13
Attending: ANESTHESIOLOGY
Payer: MEDICARE

## 2017-11-13 ENCOUNTER — HOSPITAL ENCOUNTER (OUTPATIENT)
Dept: PAIN MANAGEMENT | Facility: REHABILITATION | Age: 72
End: 2017-11-13
Attending: ANESTHESIOLOGY
Payer: MEDICARE

## 2017-11-13 VITALS
SYSTOLIC BLOOD PRESSURE: 169 MMHG | DIASTOLIC BLOOD PRESSURE: 80 MMHG | BODY MASS INDEX: 43.36 KG/M2 | OXYGEN SATURATION: 98 % | HEART RATE: 76 BPM | HEIGHT: 63 IN | TEMPERATURE: 97.4 F | RESPIRATION RATE: 16 BRPM | WEIGHT: 244.71 LBS

## 2017-11-13 PROCEDURE — 62323 NJX INTERLAMINAR LMBR/SAC: CPT

## 2017-11-13 PROCEDURE — 700117 HCHG RX CONTRAST REV CODE 255

## 2017-11-13 PROCEDURE — 64483 NJX AA&/STRD TFRM EPI L/S 1: CPT

## 2017-11-13 PROCEDURE — 99152 MOD SED SAME PHYS/QHP 5/>YRS: CPT

## 2017-11-13 PROCEDURE — 64484 NJX AA&/STRD TFRM EPI L/S EA: CPT

## 2017-11-13 PROCEDURE — 700111 HCHG RX REV CODE 636 W/ 250 OVERRIDE (IP)

## 2017-11-13 RX ORDER — DEXAMETHASONE SODIUM PHOSPHATE 10 MG/ML
INJECTION, SOLUTION INTRAMUSCULAR; INTRAVENOUS
Status: COMPLETED
Start: 2017-11-13 | End: 2017-11-13

## 2017-11-13 RX ORDER — IBUPROFEN 800 MG/1
800 TABLET ORAL EVERY 8 HOURS PRN
COMMUNITY
End: 2017-11-14

## 2017-11-13 RX ORDER — MIDAZOLAM HYDROCHLORIDE 1 MG/ML
INJECTION INTRAMUSCULAR; INTRAVENOUS
Status: COMPLETED
Start: 2017-11-13 | End: 2017-11-13

## 2017-11-13 RX ORDER — BUPIVACAINE HYDROCHLORIDE 2.5 MG/ML
INJECTION, SOLUTION EPIDURAL; INFILTRATION; INTRACAUDAL
Status: COMPLETED
Start: 2017-11-13 | End: 2017-11-13

## 2017-11-13 RX ADMIN — MIDAZOLAM HYDROCHLORIDE 1 MG: 1 INJECTION, SOLUTION INTRAMUSCULAR; INTRAVENOUS at 10:27

## 2017-11-13 RX ADMIN — FENTANYL CITRATE 50 MCG: 50 INJECTION, SOLUTION INTRAMUSCULAR; INTRAVENOUS at 10:26

## 2017-11-13 RX ADMIN — BUPIVACAINE HYDROCHLORIDE 3 ML: 2.5 INJECTION, SOLUTION EPIDURAL; INFILTRATION; INTRACAUDAL; PERINEURAL at 10:30

## 2017-11-13 RX ADMIN — IOHEXOL 5 ML: 240 INJECTION, SOLUTION INTRATHECAL; INTRAVASCULAR; INTRAVENOUS; ORAL at 10:29

## 2017-11-13 RX ADMIN — DEXAMETHASONE SODIUM PHOSPHATE 10 MG: 10 INJECTION, SOLUTION INTRAMUSCULAR; INTRAVENOUS at 10:29

## 2017-11-13 ASSESSMENT — PAIN SCALES - GENERAL
PAINLEVEL_OUTOF10: 0
PAINLEVEL_OUTOF10: 2

## 2017-11-13 NOTE — PROGRESS NOTES
Current meds. See medication reconciliation form. Reviewed with pt. Pt denies taking ASA,other blood thinners or anti-inflammatories. Pt has a ride post-procedure (, Wish is ). Printed and verbal discharge instructions given to pt who verbalized understanding.

## 2017-11-14 ENCOUNTER — OFFICE VISIT (OUTPATIENT)
Dept: URGENT CARE | Facility: PHYSICIAN GROUP | Age: 72
End: 2017-11-14
Payer: MEDICARE

## 2017-11-14 ENCOUNTER — APPOINTMENT (OUTPATIENT)
Dept: RADIOLOGY | Facility: MEDICAL CENTER | Age: 72
End: 2017-11-14
Attending: EMERGENCY MEDICINE
Payer: MEDICARE

## 2017-11-14 ENCOUNTER — HOSPITAL ENCOUNTER (OUTPATIENT)
Facility: MEDICAL CENTER | Age: 72
End: 2017-11-15
Attending: EMERGENCY MEDICINE | Admitting: HOSPITALIST
Payer: MEDICARE

## 2017-11-14 ENCOUNTER — RESOLUTE PROFESSIONAL BILLING HOSPITAL PROF FEE (OUTPATIENT)
Dept: HOSPITALIST | Facility: MEDICAL CENTER | Age: 72
End: 2017-11-14
Payer: MEDICARE

## 2017-11-14 VITALS
WEIGHT: 250 LBS | RESPIRATION RATE: 16 BRPM | DIASTOLIC BLOOD PRESSURE: 106 MMHG | BODY MASS INDEX: 46.01 KG/M2 | OXYGEN SATURATION: 95 % | TEMPERATURE: 98.8 F | HEIGHT: 62 IN | HEART RATE: 86 BPM | SYSTOLIC BLOOD PRESSURE: 180 MMHG

## 2017-11-14 DIAGNOSIS — I10 ESSENTIAL HYPERTENSION: ICD-10-CM

## 2017-11-14 DIAGNOSIS — R51.9 ACUTE NONINTRACTABLE HEADACHE, UNSPECIFIED HEADACHE TYPE: ICD-10-CM

## 2017-11-14 PROBLEM — R79.89 ELEVATED TROPONIN: Status: ACTIVE | Noted: 2017-11-14

## 2017-11-14 PROBLEM — I16.0 HYPERTENSIVE URGENCY: Status: ACTIVE | Noted: 2017-11-14

## 2017-11-14 LAB
ALBUMIN SERPL BCP-MCNC: 3.4 G/DL (ref 3.2–4.9)
ALBUMIN/GLOB SERPL: 1.1 G/DL
ALP SERPL-CCNC: 80 U/L (ref 30–99)
ALT SERPL-CCNC: 19 U/L (ref 2–50)
ANION GAP SERPL CALC-SCNC: 8 MMOL/L (ref 0–11.9)
APTT PPP: 26.6 SEC (ref 24.7–36)
AST SERPL-CCNC: 24 U/L (ref 12–45)
BASOPHILS # BLD AUTO: 0.3 % (ref 0–1.8)
BASOPHILS # BLD: 0.04 K/UL (ref 0–0.12)
BILIRUB SERPL-MCNC: 0.7 MG/DL (ref 0.1–1.5)
BUN SERPL-MCNC: 14 MG/DL (ref 8–22)
CALCIUM SERPL-MCNC: 8.4 MG/DL (ref 8.4–10.2)
CHLORIDE SERPL-SCNC: 104 MMOL/L (ref 96–112)
CO2 SERPL-SCNC: 22 MMOL/L (ref 20–33)
CREAT SERPL-MCNC: 1.01 MG/DL (ref 0.5–1.4)
EKG IMPRESSION: NORMAL
EOSINOPHIL # BLD AUTO: 0.07 K/UL (ref 0–0.51)
EOSINOPHIL NFR BLD: 0.6 % (ref 0–6.9)
ERYTHROCYTE [DISTWIDTH] IN BLOOD BY AUTOMATED COUNT: 46.3 FL (ref 35.9–50)
GFR SERPL CREATININE-BSD FRML MDRD: 54 ML/MIN/1.73 M 2
GLOBULIN SER CALC-MCNC: 3 G/DL (ref 1.9–3.5)
GLUCOSE SERPL-MCNC: 110 MG/DL (ref 65–99)
HCT VFR BLD AUTO: 39.8 % (ref 37–47)
HGB BLD-MCNC: 13.5 G/DL (ref 12–16)
IMM GRANULOCYTES # BLD AUTO: 0.05 K/UL (ref 0–0.11)
IMM GRANULOCYTES NFR BLD AUTO: 0.4 % (ref 0–0.9)
INR PPP: 1.02 (ref 0.87–1.13)
LYMPHOCYTES # BLD AUTO: 2.54 K/UL (ref 1–4.8)
LYMPHOCYTES NFR BLD: 21.2 % (ref 22–41)
MCH RBC QN AUTO: 30.8 PG (ref 27–33)
MCHC RBC AUTO-ENTMCNC: 33.9 G/DL (ref 33.6–35)
MCV RBC AUTO: 90.7 FL (ref 81.4–97.8)
MONOCYTES # BLD AUTO: 0.89 K/UL (ref 0–0.85)
MONOCYTES NFR BLD AUTO: 7.4 % (ref 0–13.4)
NEUTROPHILS # BLD AUTO: 8.41 K/UL (ref 2–7.15)
NEUTROPHILS NFR BLD: 70.1 % (ref 44–72)
NRBC # BLD AUTO: 0 K/UL
NRBC BLD AUTO-RTO: 0 /100 WBC
PLATELET # BLD AUTO: 246 K/UL (ref 164–446)
PMV BLD AUTO: 9.3 FL (ref 9–12.9)
POTASSIUM SERPL-SCNC: 3.5 MMOL/L (ref 3.6–5.5)
PROT SERPL-MCNC: 6.4 G/DL (ref 6–8.2)
PROTHROMBIN TIME: 13.2 SEC (ref 12–14.6)
RBC # BLD AUTO: 4.39 M/UL (ref 4.2–5.4)
SODIUM SERPL-SCNC: 134 MMOL/L (ref 135–145)
TROPONIN I SERPL-MCNC: 0.06 NG/ML (ref 0–0.04)
TSH SERPL DL<=0.005 MIU/L-ACNC: 0.58 UIU/ML (ref 0.35–5.5)
WBC # BLD AUTO: 12 K/UL (ref 4.8–10.8)

## 2017-11-14 PROCEDURE — 99215 OFFICE O/P EST HI 40 MIN: CPT | Performed by: PHYSICIAN ASSISTANT

## 2017-11-14 PROCEDURE — 80053 COMPREHEN METABOLIC PANEL: CPT

## 2017-11-14 PROCEDURE — 36415 COLL VENOUS BLD VENIPUNCTURE: CPT

## 2017-11-14 PROCEDURE — 71010 DX-CHEST-PORTABLE (1 VIEW): CPT

## 2017-11-14 PROCEDURE — 700111 HCHG RX REV CODE 636 W/ 250 OVERRIDE (IP): Performed by: EMERGENCY MEDICINE

## 2017-11-14 PROCEDURE — A9270 NON-COVERED ITEM OR SERVICE: HCPCS

## 2017-11-14 PROCEDURE — 85025 COMPLETE CBC W/AUTO DIFF WBC: CPT

## 2017-11-14 PROCEDURE — 93005 ELECTROCARDIOGRAM TRACING: CPT

## 2017-11-14 PROCEDURE — 85730 THROMBOPLASTIN TIME PARTIAL: CPT

## 2017-11-14 PROCEDURE — 85610 PROTHROMBIN TIME: CPT

## 2017-11-14 PROCEDURE — 84443 ASSAY THYROID STIM HORMONE: CPT

## 2017-11-14 PROCEDURE — 83735 ASSAY OF MAGNESIUM: CPT

## 2017-11-14 PROCEDURE — 99220 PR INITIAL OBSERVATION CARE,LEVL III: CPT | Performed by: HOSPITALIST

## 2017-11-14 PROCEDURE — 700102 HCHG RX REV CODE 250 W/ 637 OVERRIDE(OP)

## 2017-11-14 PROCEDURE — 70450 CT HEAD/BRAIN W/O DYE: CPT

## 2017-11-14 PROCEDURE — 99285 EMERGENCY DEPT VISIT HI MDM: CPT

## 2017-11-14 PROCEDURE — 94760 N-INVAS EAR/PLS OXIMETRY 1: CPT

## 2017-11-14 PROCEDURE — G0378 HOSPITAL OBSERVATION PER HR: HCPCS

## 2017-11-14 PROCEDURE — 84484 ASSAY OF TROPONIN QUANT: CPT

## 2017-11-14 PROCEDURE — A9270 NON-COVERED ITEM OR SERVICE: HCPCS | Performed by: HOSPITALIST

## 2017-11-14 PROCEDURE — 700102 HCHG RX REV CODE 250 W/ 637 OVERRIDE(OP): Performed by: HOSPITALIST

## 2017-11-14 PROCEDURE — 93005 ELECTROCARDIOGRAM TRACING: CPT | Performed by: EMERGENCY MEDICINE

## 2017-11-14 PROCEDURE — 96374 THER/PROPH/DIAG INJ IV PUSH: CPT

## 2017-11-14 RX ORDER — ENALAPRILAT 1.25 MG/ML
1.25 INJECTION INTRAVENOUS EVERY 6 HOURS PRN
Status: DISCONTINUED | OUTPATIENT
Start: 2017-11-14 | End: 2017-11-15 | Stop reason: HOSPADM

## 2017-11-14 RX ORDER — FLUTICASONE PROPIONATE 50 MCG
1 SPRAY, SUSPENSION (ML) NASAL EVERY MORNING
Status: DISCONTINUED | OUTPATIENT
Start: 2017-11-15 | End: 2017-11-15 | Stop reason: HOSPADM

## 2017-11-14 RX ORDER — BUDESONIDE AND FORMOTEROL FUMARATE DIHYDRATE 160; 4.5 UG/1; UG/1
2 AEROSOL RESPIRATORY (INHALATION) 2 TIMES DAILY
Status: DISCONTINUED | OUTPATIENT
Start: 2017-11-14 | End: 2017-11-15 | Stop reason: HOSPADM

## 2017-11-14 RX ORDER — ACETAMINOPHEN 325 MG/1
650 TABLET ORAL EVERY 6 HOURS PRN
Status: DISCONTINUED | OUTPATIENT
Start: 2017-11-14 | End: 2017-11-15 | Stop reason: HOSPADM

## 2017-11-14 RX ORDER — OMEPRAZOLE 20 MG/1
20 CAPSULE, DELAYED RELEASE ORAL DAILY
Status: DISCONTINUED | OUTPATIENT
Start: 2017-11-15 | End: 2017-11-15 | Stop reason: HOSPADM

## 2017-11-14 RX ORDER — HYDROCODONE BITARTRATE AND ACETAMINOPHEN 5; 325 MG/1; MG/1
1-2 TABLET ORAL EVERY 6 HOURS PRN
Status: DISCONTINUED | OUTPATIENT
Start: 2017-11-14 | End: 2017-11-15 | Stop reason: HOSPADM

## 2017-11-14 RX ORDER — DULOXETIN HYDROCHLORIDE 30 MG/1
60 CAPSULE, DELAYED RELEASE ORAL DAILY
Status: DISCONTINUED | OUTPATIENT
Start: 2017-11-15 | End: 2017-11-15 | Stop reason: HOSPADM

## 2017-11-14 RX ORDER — GUAIFENESIN 600 MG/1
600 TABLET, EXTENDED RELEASE ORAL EVERY 12 HOURS
COMMUNITY
End: 2018-08-27

## 2017-11-14 RX ORDER — BISACODYL 10 MG
10 SUPPOSITORY, RECTAL RECTAL
Status: DISCONTINUED | OUTPATIENT
Start: 2017-11-14 | End: 2017-11-15 | Stop reason: HOSPADM

## 2017-11-14 RX ORDER — LABETALOL HYDROCHLORIDE 5 MG/ML
10 INJECTION, SOLUTION INTRAVENOUS EVERY 4 HOURS PRN
Status: DISCONTINUED | OUTPATIENT
Start: 2017-11-14 | End: 2017-11-15 | Stop reason: HOSPADM

## 2017-11-14 RX ORDER — AMLODIPINE BESYLATE 5 MG/1
5 TABLET ORAL
Status: DISCONTINUED | OUTPATIENT
Start: 2017-11-14 | End: 2017-11-15 | Stop reason: HOSPADM

## 2017-11-14 RX ORDER — TRAZODONE HYDROCHLORIDE 50 MG/1
50 TABLET ORAL NIGHTLY
Status: DISCONTINUED | OUTPATIENT
Start: 2017-11-14 | End: 2017-11-15 | Stop reason: HOSPADM

## 2017-11-14 RX ORDER — MONTELUKAST SODIUM 10 MG/1
10 TABLET ORAL EVERY MORNING
Status: DISCONTINUED | OUTPATIENT
Start: 2017-11-15 | End: 2017-11-15 | Stop reason: HOSPADM

## 2017-11-14 RX ORDER — ONDANSETRON 2 MG/ML
4 INJECTION INTRAMUSCULAR; INTRAVENOUS EVERY 4 HOURS PRN
Status: DISCONTINUED | OUTPATIENT
Start: 2017-11-14 | End: 2017-11-15 | Stop reason: HOSPADM

## 2017-11-14 RX ORDER — HYDRALAZINE HYDROCHLORIDE 20 MG/ML
10 INJECTION INTRAMUSCULAR; INTRAVENOUS ONCE
Status: COMPLETED | OUTPATIENT
Start: 2017-11-14 | End: 2017-11-14

## 2017-11-14 RX ORDER — HYDROCODONE BITARTRATE AND ACETAMINOPHEN 5; 325 MG/1; MG/1
1-2 TABLET ORAL EVERY 6 HOURS PRN
COMMUNITY
End: 2023-04-04

## 2017-11-14 RX ORDER — AMOXICILLIN 250 MG
2 CAPSULE ORAL 2 TIMES DAILY
Status: DISCONTINUED | OUTPATIENT
Start: 2017-11-14 | End: 2017-11-15 | Stop reason: HOSPADM

## 2017-11-14 RX ORDER — LOSARTAN POTASSIUM 25 MG/1
100 TABLET ORAL DAILY
Status: DISCONTINUED | OUTPATIENT
Start: 2017-11-15 | End: 2017-11-15 | Stop reason: HOSPADM

## 2017-11-14 RX ORDER — DOCUSATE SODIUM 100 MG/1
100 CAPSULE, LIQUID FILLED ORAL 2 TIMES DAILY
COMMUNITY
End: 2019-11-07

## 2017-11-14 RX ORDER — POLYETHYLENE GLYCOL 3350 17 G/17G
1 POWDER, FOR SOLUTION ORAL
Status: DISCONTINUED | OUTPATIENT
Start: 2017-11-14 | End: 2017-11-15 | Stop reason: HOSPADM

## 2017-11-14 RX ORDER — POTASSIUM CHLORIDE 20 MEQ/1
40 TABLET, EXTENDED RELEASE ORAL DAILY
Status: DISCONTINUED | OUTPATIENT
Start: 2017-11-14 | End: 2017-11-15 | Stop reason: HOSPADM

## 2017-11-14 RX ORDER — LORAZEPAM 1 MG/1
0.5 TABLET ORAL EVERY 6 HOURS PRN
Status: DISCONTINUED | OUTPATIENT
Start: 2017-11-14 | End: 2017-11-15 | Stop reason: HOSPADM

## 2017-11-14 RX ORDER — ONDANSETRON 4 MG/1
4 TABLET, ORALLY DISINTEGRATING ORAL EVERY 4 HOURS PRN
Status: DISCONTINUED | OUTPATIENT
Start: 2017-11-14 | End: 2017-11-15 | Stop reason: HOSPADM

## 2017-11-14 RX ORDER — LORAZEPAM 2 MG/ML
0.5 INJECTION INTRAMUSCULAR EVERY 6 HOURS PRN
Status: DISCONTINUED | OUTPATIENT
Start: 2017-11-14 | End: 2017-11-15 | Stop reason: HOSPADM

## 2017-11-14 RX ORDER — ZOLPIDEM TARTRATE 5 MG/1
5 TABLET ORAL
Status: DISCONTINUED | OUTPATIENT
Start: 2017-11-14 | End: 2017-11-15 | Stop reason: HOSPADM

## 2017-11-14 RX ORDER — ALBUTEROL SULFATE 90 UG/1
2 AEROSOL, METERED RESPIRATORY (INHALATION)
Status: DISCONTINUED | OUTPATIENT
Start: 2017-11-14 | End: 2017-11-15 | Stop reason: HOSPADM

## 2017-11-14 RX ORDER — GABAPENTIN 300 MG/1
300 CAPSULE ORAL 3 TIMES DAILY
Status: DISCONTINUED | OUTPATIENT
Start: 2017-11-14 | End: 2017-11-15 | Stop reason: HOSPADM

## 2017-11-14 RX ADMIN — POTASSIUM CHLORIDE 40 MEQ: 1500 TABLET, EXTENDED RELEASE ORAL at 22:22

## 2017-11-14 RX ADMIN — TRAZODONE HYDROCHLORIDE 50 MG: 50 TABLET ORAL at 22:22

## 2017-11-14 RX ADMIN — BUDESONIDE AND FORMOTEROL FUMARATE DIHYDRATE 2 PUFF: 160; 4.5 AEROSOL RESPIRATORY (INHALATION) at 22:45

## 2017-11-14 RX ADMIN — AMLODIPINE BESYLATE 5 MG: 5 TABLET ORAL at 22:22

## 2017-11-14 RX ADMIN — DOCUSATE SODIUM AND SENNOSIDES 1 TABLET: 8.6; 5 TABLET, FILM COATED ORAL at 22:22

## 2017-11-14 RX ADMIN — HYDRALAZINE HYDROCHLORIDE 10 MG: 20 INJECTION INTRAMUSCULAR; INTRAVENOUS at 17:44

## 2017-11-14 ASSESSMENT — ENCOUNTER SYMPTOMS
DIZZINESS: 0
SWEATS: 0
FEVER: 0
SPEECH CHANGE: 0
BLURRED VISION: 0
SHORTNESS OF BREATH: 0
CHILLS: 0
EYE PAIN: 0
DEPRESSION: 0
ABDOMINAL PAIN: 0
COUGH: 0
NERVOUS/ANXIOUS: 0
NAUSEA: 0
SHORTNESS OF BREATH: 0
HYPERTENSION: 1
HEADACHES: 0
PALPITATIONS: 0
NECK PAIN: 0
BACK PAIN: 0
PALPITATIONS: 0
ORTHOPNEA: 0
STRIDOR: 0
HEADACHES: 1
DOUBLE VISION: 0
BLURRED VISION: 1
VOMITING: 0

## 2017-11-14 ASSESSMENT — LIFESTYLE VARIABLES
DO YOU DRINK ALCOHOL: NO
EVER_SMOKED: YES

## 2017-11-14 ASSESSMENT — PAIN SCALES - GENERAL
PAINLEVEL_OUTOF10: 0
PAINLEVEL_OUTOF10: 4

## 2017-11-14 NOTE — PROGRESS NOTES
Subjective:      Cyndy Petit is a 71 y.o. female who presents with Hypertension (BP has been high, Pt recieved injections)            Hypertension   This is a chronic problem. The current episode started in the past 7 days. The problem has been gradually worsening since onset. The problem is uncontrolled. Associated symptoms include blurred vision and headaches. Pertinent negatives include no anxiety, chest pain, malaise/fatigue, neck pain, orthopnea, palpitations, peripheral edema, shortness of breath or sweats. There are no associated agents to hypertension. Risk factors for coronary artery disease include obesity. Past treatments include alpha 1 blockers. The current treatment provides mild improvement. There are no compliance problems.    Patient presents with elevated blood pressure several days. She does seen yesterday by her pain management doctor and had a epidural injection. She was given Decadron. She had elevated blood pressure before her procedure, she was given a sedative and her blood pressure decreased. She then purchased a blood pressure machine last night and her blood pressures continued to run high in the 190/110 range. She does have hypertension takes losartan 100 mg daily. She did take an extra losartan today with no relief. Patient does report a headache and some nausea.      PMH:  has a past medical history of Arthritis; Asthma; Carpal tunnel syndrome; Cold (4-8-2017); Depression; DJD (degenerative joint disease); GERD (gastroesophageal reflux disease); GERD (gastroesophageal reflux disease); Heart burn; Hiatus hernia syndrome; Hypertension; Obesity; Obstructive sleep apnea; Pain; Sleep apnea; Snoring; Sorethroat (2/09/2016); Thrush; and Urinary incontinence.  MEDS:   Current Outpatient Prescriptions:   •  duloxetine (CYMBALTA) 60 MG Cap DR Particles delayed-release capsule, Take 60 mg by mouth every day., Disp: , Rfl:   •  Dextromethorphan-Guaifenesin (MUCINEX DM)  MG TABLET SR  12 HR, Take 1 Tab by mouth every 6 hours as needed., Disp: 28 Tab, Rfl: 0  •  albuterol (PROVENTIL) 2.5mg/3ml Nebu Soln solution for nebulization, , Disp: , Rfl:   •  gabapentin (NEURONTIN) 300 MG CAPS, Take 1 Cap by mouth 3 times a day., Disp: 90 Cap, Rfl: 0  •  fluticasone-salmeterol (ADVAIR) 500-50 MCG/DOSE AEPB, Inhale 1 Puff by mouth every 12 hours., Disp: , Rfl:   •  albuterol (PROAIR HFA) 108 (90 BASE) MCG/ACT AERS, Inhale 2 Puffs by mouth every 6 hours as needed. Shortness of breath  Indications: Asthma, Disp: , Rfl:   •  losartan (COZAAR) 100 MG TABS, Take 100 mg by mouth every day., Disp: , Rfl:   •  nystatin (MYCOSTATIN) 776422 UNIT/ML SUSP, Take 100,000 Units by mouth 5 Times a Day., Disp: , Rfl:   •  montelukast (SINGULAIR) 10 MG TABS, Take 10 mg by mouth every morning., Disp: , Rfl:   •  trazodone (DESYREL) 50 MG TABS, Take 50 mg by mouth every evening., Disp: , Rfl:   •  fluticasone (FLONASE) 50 MCG/ACT nasal spray, Spray 1 Spray in nose every morning. Each Nostril, Disp: , Rfl:   •  omeprazole (PRILOSEC) 20 MG CPDR, Take 20 mg by mouth 2 Times a Day., Disp: , Rfl:   •  ibuprofen (MOTRIN) 800 MG Tab, Take 800 mg by mouth every 8 hours as needed., Disp: , Rfl:   •  hydrocodone-acetaminophen (NORCO) 5-325 MG Tab per tablet, Take 1-2 Tabs by mouth every 6 hours as needed (As needed for breakthrough pain. Max 8 tabs daily. Wean off pain medications as tolerated. Do not drive while taking. No ETOH.)., Disp: 60 Tab, Rfl: 0  •  nystatin/triamcinolone (MYCOLOG) 416246-4.1 UNIT/GM-% Cream, Apply  to affected area(s) 4 times a day., Disp: , Rfl:   •  Docusate Calcium (STOOL SOFTENER PO), Take  by mouth., Disp: , Rfl:   •  diphenhydrAMINE (BENADRYL) 50 MG Cap, Take 50 mg by mouth at bedtime as needed., Disp: , Rfl:   ALLERGIES:   Allergies   Allergen Reactions   • Allergy      Unknown antibiotic   • Cefepime Hives   • Cephalosporins Hives   • Clarithromycin Hives   • Cleocin [Clindamycin] Itching   • Meropenem     • Morphine Itching   • Sulfamethoxazole-Trimethoprim Itching     SURGHX:   Past Surgical History:   Procedure Laterality Date   • KNEE ARTHROPLASTY TOTAL Right 5/1/2017    Procedure: KNEE ARTHROPLASTY TOTAL;  Surgeon: Jesús Rutledge M.D.;  Location: SURGERY [facility];  Service:    • KNEE ARTHROPLASTY TOTAL Left 3/7/2016    Procedure: KNEE ARTHROPLASTY TOTAL;  Surgeon: Jesús Rutledge M.D.;  Location: [facility];  Service:    • LUMBAR FUSION POSTERIOR  9/13/2013    Performed by Kaushal Ayala M.D. at SURGERY [facility]   • LUMBAR LAMINECTOMY DISKECTOMY  9/13/2013    Performed by Kaushal Ayala M.D. at SURGERY [facility]   • LUMBAR LAMINECTOMY DISKECTOMY  5/30/2013    Performed by Kaushal Ayala M.D. at SURGERY [facility]   • CHOLECYSTECTOMY  2010    laparoscopic   • OTHER NEUROLOGICAL SURG  2/2009    I&D of brain from sinus infection   • FORAMINOTOMY  11/26/08    Performed by [facility] at SURGERY [facility]   • HARDWARE REMOVAL NEURO  11/26/08    Performed by [facility] at SURGERY [facility]   • KNEE ARTHROSCOPY Right 2008   • LUMBAR FUSION POSTERIOR  2005   • CARPAL TUNNEL RELEASE Right 2000   • SHOULDER ARTHROTOMY Right 2000     SOCHX:  reports that she quit smoking about 39 years ago. Her smoking use included Cigarettes. She started smoking about 45 years ago. She has a 6.00 pack-year smoking history. She has never used smokeless tobacco. She reports that she does not drink alcohol or use drugs.  FH: family history includes Cancer in her mother; Heart Disease in her father and mother; Hypertension in her mother.      Review of Systems   Constitutional: Negative for malaise/fatigue.   Eyes: Positive for blurred vision.   Respiratory: Negative for shortness of breath.    Cardiovascular: Negative for chest pain, palpitations and orthopnea.   Musculoskeletal: Negative for neck pain.   Neurological: Positive for headaches.       Medications, Allergies,  "and current problem list reviewed today in Epic     Objective:     BP (!) 180/106   Pulse 86   Temp 37.1 °C (98.8 °F)   Resp 16   Ht 1.575 m (5' 2.01\")   Wt 113.4 kg (250 lb)   SpO2 95%   BMI 45.71 kg/m²      Physical Exam   Constitutional: She is oriented to person, place, and time. She appears well-developed and well-nourished. No distress.   HENT:   Head: Normocephalic and atraumatic.   Right Ear: External ear normal.   Left Ear: External ear normal.   Eyes: Conjunctivae and EOM are normal. Pupils are equal, round, and reactive to light. Right eye exhibits no discharge. Left eye exhibits no discharge.   Neck: Normal range of motion. Neck supple.   Cardiovascular: Normal rate, regular rhythm and normal heart sounds.    Pulmonary/Chest: Effort normal and breath sounds normal. No respiratory distress. She has no wheezes.   Abdominal: Soft. She exhibits no distension. There is no tenderness.   Neurological: She is alert and oriented to person, place, and time. No cranial nerve deficit.   Skin: Skin is warm and dry. She is not diaphoretic.   Psychiatric: She has a normal mood and affect. Her behavior is normal. Judgment and thought content normal.   Nursing note and vitals reviewed.         Right arm: 180/100  Left arm: 185/105     Assessment/Plan:     1. Essential hypertension     2. Acute nonintractable headache, unspecified headache type       Spoke to Dr. Altaf Harp, her pain management specialist. He states that he may be having a mild rebound hypertension secondary to Decadron. He does note that she had elevated blood pressure prior to her epidural. He thinks it may be uncontrolled hypertension.  I then placed a call to her primary care provider Dr. Sultana Browne. She instructed me to send the patient to the ER for blood pressure control.  Patient was instructed to go to the ER now. She will take herself by POV to the [facility].    Please note that this dictation was created using voice " recognition software. I have made every reasonable attempt to correct obvious errors, but I expect that there are errors of grammar and possibly content that I did not discover before finalizing the note.

## 2017-11-15 VITALS
TEMPERATURE: 98.2 F | RESPIRATION RATE: 20 BRPM | HEART RATE: 71 BPM | BODY MASS INDEX: 45.6 KG/M2 | DIASTOLIC BLOOD PRESSURE: 48 MMHG | HEIGHT: 62 IN | SYSTOLIC BLOOD PRESSURE: 116 MMHG | OXYGEN SATURATION: 93 % | WEIGHT: 247.8 LBS

## 2017-11-15 LAB
ANION GAP SERPL CALC-SCNC: 7 MMOL/L (ref 0–11.9)
BASOPHILS # BLD AUTO: 0.7 % (ref 0–1.8)
BASOPHILS # BLD: 0.07 K/UL (ref 0–0.12)
BUN SERPL-MCNC: 12 MG/DL (ref 8–22)
CALCIUM SERPL-MCNC: 8.2 MG/DL (ref 8.4–10.2)
CHLORIDE SERPL-SCNC: 106 MMOL/L (ref 96–112)
CO2 SERPL-SCNC: 23 MMOL/L (ref 20–33)
CREAT SERPL-MCNC: 0.98 MG/DL (ref 0.5–1.4)
EOSINOPHIL # BLD AUTO: 0.13 K/UL (ref 0–0.51)
EOSINOPHIL NFR BLD: 1.4 % (ref 0–6.9)
ERYTHROCYTE [DISTWIDTH] IN BLOOD BY AUTOMATED COUNT: 47.6 FL (ref 35.9–50)
GFR SERPL CREATININE-BSD FRML MDRD: 56 ML/MIN/1.73 M 2
GLUCOSE SERPL-MCNC: 104 MG/DL (ref 65–99)
HCT VFR BLD AUTO: 40 % (ref 37–47)
HGB BLD-MCNC: 13.3 G/DL (ref 12–16)
IMM GRANULOCYTES # BLD AUTO: 0.03 K/UL (ref 0–0.11)
IMM GRANULOCYTES NFR BLD AUTO: 0.3 % (ref 0–0.9)
LYMPHOCYTES # BLD AUTO: 2.76 K/UL (ref 1–4.8)
LYMPHOCYTES NFR BLD: 29 % (ref 22–41)
MAGNESIUM SERPL-MCNC: 2 MG/DL (ref 1.5–2.5)
MCH RBC QN AUTO: 30.4 PG (ref 27–33)
MCHC RBC AUTO-ENTMCNC: 33.3 G/DL (ref 33.6–35)
MCV RBC AUTO: 91.5 FL (ref 81.4–97.8)
MONOCYTES # BLD AUTO: 0.78 K/UL (ref 0–0.85)
MONOCYTES NFR BLD AUTO: 8.2 % (ref 0–13.4)
NEUTROPHILS # BLD AUTO: 5.75 K/UL (ref 2–7.15)
NEUTROPHILS NFR BLD: 60.4 % (ref 44–72)
NRBC # BLD AUTO: 0 K/UL
NRBC BLD AUTO-RTO: 0 /100 WBC
PLATELET # BLD AUTO: 216 K/UL (ref 164–446)
PMV BLD AUTO: 9.6 FL (ref 9–12.9)
POTASSIUM SERPL-SCNC: 3.5 MMOL/L (ref 3.6–5.5)
RBC # BLD AUTO: 4.37 M/UL (ref 4.2–5.4)
SODIUM SERPL-SCNC: 136 MMOL/L (ref 135–145)
TROPONIN I SERPL-MCNC: <0.02 NG/ML (ref 0–0.04)
WBC # BLD AUTO: 9.5 K/UL (ref 4.8–10.8)

## 2017-11-15 PROCEDURE — G0378 HOSPITAL OBSERVATION PER HR: HCPCS

## 2017-11-15 PROCEDURE — 99217 PR OBSERVATION CARE DISCHARGE: CPT | Performed by: FAMILY MEDICINE

## 2017-11-15 PROCEDURE — 85025 COMPLETE CBC W/AUTO DIFF WBC: CPT

## 2017-11-15 PROCEDURE — 94660 CPAP INITIATION&MGMT: CPT

## 2017-11-15 PROCEDURE — 94760 N-INVAS EAR/PLS OXIMETRY 1: CPT

## 2017-11-15 PROCEDURE — 700102 HCHG RX REV CODE 250 W/ 637 OVERRIDE(OP): Performed by: HOSPITALIST

## 2017-11-15 PROCEDURE — 80048 BASIC METABOLIC PNL TOTAL CA: CPT

## 2017-11-15 PROCEDURE — A9270 NON-COVERED ITEM OR SERVICE: HCPCS | Performed by: HOSPITALIST

## 2017-11-15 RX ORDER — AMLODIPINE BESYLATE 5 MG/1
5 TABLET ORAL DAILY
Qty: 15 TAB | Refills: 0 | Status: SHIPPED | OUTPATIENT
Start: 2017-11-16 | End: 2017-12-01

## 2017-11-15 RX ADMIN — LOSARTAN POTASSIUM 100 MG: 25 TABLET ORAL at 08:13

## 2017-11-15 RX ADMIN — POTASSIUM CHLORIDE 40 MEQ: 1500 TABLET, EXTENDED RELEASE ORAL at 08:13

## 2017-11-15 RX ADMIN — AMLODIPINE BESYLATE 5 MG: 5 TABLET ORAL at 08:13

## 2017-11-15 RX ADMIN — MONTELUKAST SODIUM 10 MG: 10 TABLET, FILM COATED ORAL at 08:14

## 2017-11-15 RX ADMIN — OMEPRAZOLE 20 MG: 20 CAPSULE, DELAYED RELEASE ORAL at 08:13

## 2017-11-15 RX ADMIN — DULOXETINE HYDROCHLORIDE 60 MG: 30 CAPSULE, DELAYED RELEASE ORAL at 08:14

## 2017-11-15 RX ADMIN — DOCUSATE SODIUM AND SENNOSIDES 2 TABLET: 8.6; 5 TABLET, FILM COATED ORAL at 08:13

## 2017-11-15 RX ADMIN — GABAPENTIN 300 MG: 300 CAPSULE ORAL at 08:13

## 2017-11-15 RX ADMIN — HYDROCODONE BITARTRATE AND ACETAMINOPHEN 1 TABLET: 5; 325 TABLET ORAL at 08:19

## 2017-11-15 RX ADMIN — HYDROCODONE BITARTRATE AND ACETAMINOPHEN 1 TABLET: 5; 325 TABLET ORAL at 00:19

## 2017-11-15 RX ADMIN — FLUTICASONE PROPIONATE 50 MCG: 50 SPRAY, METERED NASAL at 08:14

## 2017-11-15 RX ADMIN — BUDESONIDE AND FORMOTEROL FUMARATE DIHYDRATE 2 PUFF: 160; 4.5 AEROSOL RESPIRATORY (INHALATION) at 08:14

## 2017-11-15 ASSESSMENT — PAIN SCALES - GENERAL
PAINLEVEL_OUTOF10: 2
PAINLEVEL_OUTOF10: 5
PAINLEVEL_OUTOF10: 0
PAINLEVEL_OUTOF10: 2

## 2017-11-15 ASSESSMENT — LIFESTYLE VARIABLES: EVER_SMOKED: YES

## 2017-11-15 NOTE — PROGRESS NOTES
Report received from RENNY Yeboah, assisted to bathroom, uses cane, denies any pain,POC discussed.

## 2017-11-15 NOTE — ED NOTES
Pt prepared for transport to [facility]. Pt in stable condition and VSS upon transport.

## 2017-11-15 NOTE — DIETARY
NUTRITION SERVICES: BMI - Pt with BMI >40 (45.69). Weight loss counseling not appropriate in acute care setting. RECOMMEND - Referral to outpatient nutrition services for weight management after D/C.

## 2017-11-15 NOTE — ED NOTES
"While transporting the pt to the floor she admits to taking her medication. Pt states that she took oxycodone and gabapentin but when she pointed it out to me on her medication list, it showed Norco 5/325 1 tab and Gabapentin 300mg. Pt told that she wasn't supposed to take her own medication as I had told her previously, and she states \"I know but I couldn't wait any longer. You told me the doctor would be in to see me but he never came in so I took my medication.\" RN asked pt for the bottles and she states \"I'm not giving you those. I won't give up my bottles to you or another nurse.\"    Receiving nurse notified.   "

## 2017-11-15 NOTE — ED PROVIDER NOTES
ED Provider Note    CHIEF COMPLAINT  Chief Complaint   Patient presents with   • Blood Pressure Problem   • Sent from Urgent Care       HPI  Cyndy Petit is a 71 y.o. female who presents For elevated blood pressure that has been more noticeable the last 2 days. The patient has a history of chronic hypertension and takes losartan daily. She has not had her dose changed in several years. She went to get an epidural injection in her neck yesterday and her blood pressure was elevated. They gave her some medication to bring her blood pressure down and then continued with the procedure. Today, her blood pressure has crept up even higher. She states that last night she had a headache with some mild vision changes and some palpitations. She does not have any of these symptoms currently. She denies any falls or trauma. She denies any unilateral weakness or numbness. She denies any difficulty with speech. She states that she has gained weight over the last several years. she denies any chest pains or shortness of breath.    REVIEW OF SYSTEMS     HEENT:  No ear pain, congestion or sore throat   EYES: no discharge redness or vision changes  CARDIAC: no chest pain, positive palpitations    PULMONARY: no dyspnea, cough or congestion   GI: no vomiting diarrhea or abdominal pain   : no dysuria, back pain or hematuria   Neuro: no weakness, numbness aphasia. Positive headache  Musculoskeletal: no swelling deformity or pain no joint swelling  Endocrine: no fevers, sweating, weight loss   SKIN: no rash, erythema or contusions     See history of present illness all other systems are negative     PAST MEDICAL HISTORY  Past Medical History:   Diagnosis Date   • Arthritis    • Asthma    • Carpal tunnel syndrome    • Cold 4-8-2017    cold; taking antibiotics  4-   • Depression    • DJD (degenerative joint disease)    • GERD (gastroesophageal reflux disease)    • GERD (gastroesophageal reflux disease)    • Heart burn    •  "Hiatus hernia syndrome    • Hypertension    • Obesity    • Obstructive sleep apnea    • Pain     back, knees   • Sleep apnea     CPAP   • Snoring    • Sorethroat 2/09/2016   • Thrush     from \"Advair\", takes nystatin   • Urinary incontinence        FAMILY HISTORY  Family History   Problem Relation Age of Onset   • Heart Disease Father    • Hypertension Mother    • Cancer Mother    • Heart Disease Mother    • Heart Disease     • Hypertension     • Stroke     • Lung Disease         SOCIAL HISTORY  Social History     Social History   • Marital status:      Spouse name: N/A   • Number of children: N/A   • Years of education: N/A     Social History Main Topics   • Smoking status: Former Smoker     Packs/day: 1.00     Years: 6.00     Types: Cigarettes     Start date: 1/1/1972     Quit date: 1/1/1978   • Smokeless tobacco: Never Used      Comment: 1978   • Alcohol use No   • Drug use: No   • Sexual activity: Not on file     Other Topics Concern   • Not on file     Social History Narrative    ** Merged History Encounter **            SURGICAL HISTORY  Past Surgical History:   Procedure Laterality Date   • KNEE ARTHROPLASTY TOTAL Right 5/1/2017    Procedure: KNEE ARTHROPLASTY TOTAL;  Surgeon: Jesús Rutledge M.D.;  Location: [facility];  Service:    • KNEE ARTHROPLASTY TOTAL Left 3/7/2016    Procedure: KNEE ARTHROPLASTY TOTAL;  Surgeon: Jesús Rutledge M.D.;  Location: [facility];  Service:    • LUMBAR FUSION POSTERIOR  9/13/2013    Performed by Kaushal Ayala M.D. at [facility]   • LUMBAR LAMINECTOMY DISKECTOMY  9/13/2013    Performed by Kaushal Ayala M.D. at [facility]   • LUMBAR LAMINECTOMY DISKECTOMY  5/30/2013    Performed by Kaushal Ayala M.D. at [facility]   • CHOLECYSTECTOMY  2010    laparoscopic   • OTHER NEUROLOGICAL SURG  2/2009    I&D of brain from sinus infection   • FORAMINOTOMY  11/26/08    Performed by [facility] at SURGERY " "[facility]   • HARDWARE REMOVAL NEURO  11/26/08    Performed by [facility] at SURGERY [facility]   • KNEE ARTHROSCOPY Right 2008   • LUMBAR FUSION POSTERIOR  2005   • CARPAL TUNNEL RELEASE Right 2000   • SHOULDER ARTHROTOMY Right 2000       CURRENT MEDICATIONS  Home Medications     Reviewed by Hafsa Cosby (Pharmacy Tech) on 11/14/17 at 1703  Med List Status: Complete   Medication Last Dose Status   albuterol (PROAIR HFA) 108 (90 BASE) MCG/ACT AERS 11/14/2017 Active   diphenhydrAMINE (BENADRYL) 50 MG Cap 11/14/2017 Active   docusate sodium (COLACE) 100 MG Cap 11/14/2017 Active   duloxetine (CYMBALTA) 60 MG Cap DR Particles delayed-release capsule 11/14/2017 Active   fluticasone (FLONASE) 50 MCG/ACT nasal spray 11/14/2017 Active   fluticasone-salmeterol (ADVAIR) 500-50 MCG/DOSE AEPB 11/14/2017 Active   gabapentin (NEURONTIN) 300 MG CAPS 11/14/2017 Active   guaifenesin LA (MUCINEX) 600 MG TABLET SR 12 HR 11/14/2017 Active   hydrocodone-acetaminophen (NORCO) 5-325 MG Tab per tablet 11/14/2017 Active   losartan (COZAAR) 100 MG TABS 11/14/2017 Active   montelukast (SINGULAIR) 10 MG TABS 11/14/2017 Active   nystatin (MYCOSTATIN) 304576 UNIT/ML SUSP 11/14/2017 Active   nystatin/triamcinolone (MYCOLOG) 190967-3.1 UNIT/GM-% Cream > 1 week Active   omeprazole (PRILOSEC) 20 MG CPDR 11/14/2017 Active   trazodone (DESYREL) 50 MG TABS 11/13/2017 Active                ALLERGIES  Allergies   Allergen Reactions   • Allergy      Unknown antibiotic   • Cefepime Hives   • Cephalosporins Hives   • Clarithromycin Hives   • Cleocin [Clindamycin] Itching   • Meropenem Hives   • Morphine Itching   • Sulfamethoxazole-Trimethoprim Itching       PHYSICAL EXAM  VITAL SIGNS: BP (!) 195/107   Pulse 75   Temp 37.2 °C (99 °F)   Resp 18   Ht 1.575 m (5' 2\")   Wt 113.3 kg (249 lb 12.5 oz)   SpO2 94%   BMI 45.69 kg/m²   Constitutional: Well developed, Well nourished, No acute distress, Non-toxic appearance.   HEENT: " Normocephalic, Atraumatic,  external ears normal, pharynx pink,  Mucous  Membranes moist, No rhinorrhea or mucosal edema  Eyes: PERRL, EOMI, Conjunctiva normal, No discharge.   Neck: Normal range of motion, No tenderness, Supple, No stridor.   Lymphatic: No lymphadenopathy    Cardiovascular: Regular Rate and Rhythm, No murmurs,  rubs, or gallops.   Thorax & Lungs: Lungs clear to auscultation bilaterally, No respiratory distress, No wheezes, rhales or rhonchi, No chest wall tenderness.   Abdomen: Obese Bowel sounds normal, Soft, non tender, non distended,  No pulsatile masses., no rebound guarding or peritoneal signs.   Skin: Warm, Dry, No erythema, No rash,   Back:  No CVA tenderness,  No spinal tenderness, bony crepitance step offs or instability.   Extremities: Equal, intact distal pulses, No cyanosis, clubbing or edema,  No tenderness.   Musculoskeletal: Good range of motion in all major joints. No tenderness to palpation or major deformities noted.   Neurologic: Alert & oriented x 3, Cranial nerves II-XII intact, Equal strength and sensation upper and lower extremities bilaterally,  No focal deficits noted.   Psychiatric: Affect normal, Judgment normal, Mood normal. No suicidal or homicidal ideation    EKG  EKG Interpretation    See interpretation below      RADIOLOGY/PROCEDURES  CT-HEAD W/O   Final Result      1.  There is no acute intracranial hemorrhage or territorial infarct.   2.  There are postoperative changes of bilateral temporal craniotomy and partial mastoidectomy.   3.  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   4.  There is mild chronic sinus disease.      DX-CHEST-PORTABLE (1 VIEW)   Final Result      1.  There is no acute cardiopulmonary process.            COURSE & MEDICAL DECISION MAKING  Pertinent Labs & Imaging studies reviewed. (See chart for details)  Differential diagnosis: Hypertensive urgency, CVA, TIA    Results for orders placed or performed during the hospital encounter  of 11/14/17   CBC WITH DIFFERENTIAL   Result Value Ref Range    WBC 12.0 (H) 4.8 - 10.8 K/uL    RBC 4.39 4.20 - 5.40 M/uL    Hemoglobin 13.5 12.0 - 16.0 g/dL    Hematocrit 39.8 37.0 - 47.0 %    MCV 90.7 81.4 - 97.8 fL    MCH 30.8 27.0 - 33.0 pg    MCHC 33.9 33.6 - 35.0 g/dL    RDW 46.3 35.9 - 50.0 fL    Platelet Count 246 164 - 446 K/uL    MPV 9.3 9.0 - 12.9 fL    Neutrophils-Polys 70.10 44.00 - 72.00 %    Lymphocytes 21.20 (L) 22.00 - 41.00 %    Monocytes 7.40 0.00 - 13.40 %    Eosinophils 0.60 0.00 - 6.90 %    Basophils 0.30 0.00 - 1.80 %    Immature Granulocytes 0.40 0.00 - 0.90 %    Nucleated RBC 0.00 /100 WBC    Neutrophils (Absolute) 8.41 (H) 2.00 - 7.15 K/uL    Lymphs (Absolute) 2.54 1.00 - 4.80 K/uL    Monos (Absolute) 0.89 (H) 0.00 - 0.85 K/uL    Eos (Absolute) 0.07 0.00 - 0.51 K/uL    Baso (Absolute) 0.04 0.00 - 0.12 K/uL    Immature Granulocytes (abs) 0.05 0.00 - 0.11 K/uL    NRBC (Absolute) 0.00 K/uL   COMP METABOLIC PANEL   Result Value Ref Range    Sodium 134 (L) 135 - 145 mmol/L    Potassium 3.5 (L) 3.6 - 5.5 mmol/L    Chloride 104 96 - 112 mmol/L    Co2 22 20 - 33 mmol/L    Anion Gap 8.0 0.0 - 11.9    Glucose 110 (H) 65 - 99 mg/dL    Bun 14 8 - 22 mg/dL    Creatinine 1.01 0.50 - 1.40 mg/dL    Calcium 8.4 8.4 - 10.2 mg/dL    AST(SGOT) 24 12 - 45 U/L    ALT(SGPT) 19 2 - 50 U/L    Alkaline Phosphatase 80 30 - 99 U/L    Total Bilirubin 0.7 0.1 - 1.5 mg/dL    Albumin 3.4 3.2 - 4.9 g/dL    Total Protein 6.4 6.0 - 8.2 g/dL    Globulin 3.0 1.9 - 3.5 g/dL    A-G Ratio 1.1 g/dL   TROPONIN   Result Value Ref Range    Troponin I 0.06 (H) 0.00 - 0.04 ng/mL   PROTHROMBIN TIME   Result Value Ref Range    PT 13.2 12.0 - 14.6 sec    INR 1.02 0.87 - 1.13   APTT   Result Value Ref Range    APTT 26.6 24.7 - 36.0 sec   ESTIMATED GFR   Result Value Ref Range    GFR If African American >60 >60 mL/min/1.73 m 2    GFR If Non African American 54 (A) >60 mL/min/1.73 m 2   EKG (ER)   Result Value Ref Range    Report        [facility] Emergency Dept.    Test Date:  2017  Pt Name:    ANGELA SHAIKH              Department: [facility]  MRN:        [id number]                      Room:       -ROOM 8  Gender:     F                            Technician: [id number]  :        1945                   Requested By:ER TRIAGE PROTOCOL  Order #:    [id number]                    Reading MD: MARSHALL ROJAS MD    Measurements  Intervals                                Axis  Rate:       76                           P:          41  MI:         172                          QRS:        20  QRSD:       82                           T:          14  QT:         396  QTc:        446    Interpretive Statements  SINUS RHYTHM  Compared to ECG 2017 22:56:27  No significant changes    Electronically Signed On 2017 18:33:38 PST by MARSHALL ROJAS MD          5:27 PM An IV was started and labs were drawn. I gave the patient 10 milligrams of Apresoline for her elevated blood pressure.    6:32 PM The patient's repeat blood pressure is 167/64. After the hydralazine. She is still symptom free.Her troponin is slightly elevated. I spoke with the hospitalist and we will watch her overnight and do serial troponins and give her medications to control her blood pressure. Patient understands her need to stay at this time and is willing to stay.  6:52 PM I spoke with Dr. Serrano, who accepts her for admission      FINAL IMPRESSION  1. Essential hypertension           PLAN/DISPOSITION  Admitted in guarded condition          Electronically signed by: Marshall Rojas, 2017 5:08 PM

## 2017-11-15 NOTE — ED NOTES
"Pt c/o high blood pressure x 1 week. Pt states she got \"pain shots\" in her back yesterday and blood pressure has been higher than normal. Pt called PCP and was referred to urgent care. Pt states urgent care called pt's PCP and was told to send pt to ED.   "

## 2017-11-15 NOTE — RESPIRATORY CARE
Patient was placed on CPAP of [id number] w/ RA. Patient is conscious enough to take mask off if they need to.

## 2017-11-15 NOTE — DISCHARGE SUMMARY
CHIEF COMPLAINT ON ADMISSION  Chief Complaint   Patient presents with   • Blood Pressure Problem   • Sent from Urgent Care       CODE STATUS  Full Code    HPI & HOSPITAL COURSE  This is a 71 y.o. female here with Elevated blood pressure and troponin leak. Blood pressure was better controlled with added blood pressure medicine. Serial troponin trended down to normal. Patient never complained of chest pain upon admission or while her hospital stay. Patient may speed recovery. She was advised to monitor her blood pressure closely at home and follow up with her PCP to adjust her medications if needed. She was discharged home in stable condition.    Therefore, she is discharged in good and stable condition with close outpatient follow-up.    SPECIFIC OUTPATIENT FOLLOW-UP  Follow-up PCP in 1 week.  Monitor blood pressure closely at home.      DISCHARGE PROBLEM LIST  Active Problems:    Back pain POA: Yes    BEATRIZ (obstructive sleep apnea) POA: Yes      Overview: AHI 12.4, minimum saturation 84%, on CPAP 16 cm.    Elevated troponin POA: Unknown    Hypertensive urgency POA: Unknown  Resolved Problems:    * No resolved hospital problems. *      FOLLOW UP  No future appointments.  No follow-up provider specified.    MEDICATIONS ON DISCHARGE   Cyndy Petit   Home Medication Instructions QUOC:[id number]    Printed on:11/15/17 1113   Medication Information                      albuterol (PROAIR HFA) 108 (90 BASE) MCG/ACT AERS  Inhale 2 Puffs by mouth every 6 hours as needed for Shortness of Breath. Shortness of breath             amlodipine (NORVASC) 5 MG Tab  Take 1 Tab by mouth every day for 15 days.             diphenhydrAMINE (BENADRYL) 50 MG Cap  Take 50 mg by mouth every bedtime.             docusate sodium (COLACE) 100 MG Cap  Take 100 mg by mouth 2 times a day.             duloxetine (CYMBALTA) 60 MG Cap DR Particles delayed-release capsule  Take 60 mg by mouth every day.             fluticasone (FLONASE) 50 MCG/ACT  nasal spray  Spray 1 Spray in nose every morning. Each Nostril             fluticasone-salmeterol (ADVAIR) 500-50 MCG/DOSE AEPB  Inhale 1 Puff by mouth every 12 hours.             gabapentin (NEURONTIN) 300 MG CAPS  Take 1 Cap by mouth 3 times a day.             guaifenesin LA (MUCINEX) 600 MG TABLET SR 12 HR  Take 600 mg by mouth every 12 hours.             hydrocodone-acetaminophen (NORCO) 5-325 MG Tab per tablet  Take 1-2 Tabs by mouth 3 times a day.             losartan (COZAAR) 100 MG TABS  Take 100 mg by mouth every day.             montelukast (SINGULAIR) 10 MG TABS  Take 10 mg by mouth every morning.             nystatin (MYCOSTATIN) 846424 UNIT/ML SUSP  Take 100,000 Units by mouth 5 Times a Day.             nystatin/triamcinolone (MYCOLOG) 267634-3.1 UNIT/GM-% Cream  Apply 1 Application to affected area(s) as needed (For vaginal).             omeprazole (PRILOSEC) 20 MG CPDR  Take 20 mg by mouth every day.             trazodone (DESYREL) 50 MG TABS  Take 50 mg by mouth every evening.                 DIET  Orders Placed This Encounter   Procedures   • Diet Order     Standing Status:   Standing     Number of Occurrences:   1     Order Specific Question:   Diet:     Answer:   Cardiac [6]       ACTIVITY  As tolerated.  Weight bearing as tolerated      CONSULTATIONS  None    PROCEDURES  None    LABORATORY  Lab Results   Component Value Date/Time    SODIUM 136 11/15/2017 04:46 AM    POTASSIUM 3.5 (L) 11/15/2017 04:46 AM    CHLORIDE 106 11/15/2017 04:46 AM    CO2 23 11/15/2017 04:46 AM    GLUCOSE 104 (H) 11/15/2017 04:46 AM    BUN 12 11/15/2017 04:46 AM    CREATININE 0.98 11/15/2017 04:46 AM    CREATININE 0.8 02/09/2009 03:17 AM        Lab Results   Component Value Date/Time    WBC 9.5 11/15/2017 04:46 AM    HEMOGLOBIN 13.3 11/15/2017 04:46 AM    HEMATOCRIT 40.0 11/15/2017 04:46 AM    PLATELETCT 216 11/15/2017 04:46 AM        Total time of the discharge process exceeds 36 minutes

## 2017-11-15 NOTE — ED NOTES
"Med rec updated and complete  Allergies reviewed  Pt states \"I was felling so bad, that I took my LOSARTAN 100MG at 0430 and 1200 today (11/14/2017)\".  \"I always take a BENADRYL 50MG at HS, but today (11/14/2017) to took another one @ 1400\".  Pt states \"No antibiotics in the last 30 days\".  Pt states \"No vitamins\".    "

## 2017-11-15 NOTE — H&P
Hospital Medicine History and Physical    Date of Service  11/14/2017    Chief Complaint  Chief Complaint   Patient presents with   • Blood Pressure Problem   • Sent from Urgent Care       History of Presenting Illness  71 y.o. female who presented 11/14/2017 with High blood pressure and mildly elevated troponin. Patient was had chronic back pain and had a recent epidural but Dr. Harp. Before the procedure he was noted she had high blood pressure and was given Ativan to help lower her blood pressure. That was one day ago. Patient states she's been feeling some pulsing and her head with concerns of some pain behind her eyes but no headache. She bought a home blood pressure machine and found her blood pressures extremely elevated 190/110. She went to an urgent care it was also higher than that they're. They sent her here to [facility]. Here is noted that she had elevated blood pressure she is giving supplemental blood pressures help bring it down. As she is only on losartan I felt to be best his we start her on a 2nd oral medication here and monitor her troponin. She denies any chest pain, shortness of breath, no headache. She did state some visual discomfort but no blurred vision. She denies any neurologic/muscular deficits.    Primary Care Physician  Sultana Browne M.D.    Code Status  Full code    Review of Systems  Review of Systems   Constitutional: Negative for chills and fever.   Eyes: Negative for blurred vision, double vision and pain.   Respiratory: Negative for cough, shortness of breath and stridor.    Cardiovascular: Negative for chest pain, palpitations and leg swelling.   Gastrointestinal: Negative for abdominal pain, nausea and vomiting.   Genitourinary: Negative for dysuria and hematuria.   Musculoskeletal: Negative for back pain and joint pain.   Skin: Negative for rash.   Neurological: Negative for dizziness, speech change and headaches.   Psychiatric/Behavioral: Negative for depression. The  "patient is not nervous/anxious.           Past Medical History  Past Medical History:   Diagnosis Date   • Cold 4-8-2017    cold; taking antibiotics  4-   • Sorethroat 2/09/2016   • Arthritis    • Asthma    • Carpal tunnel syndrome    • Depression    • DJD (degenerative joint disease)    • GERD (gastroesophageal reflux disease)    • GERD (gastroesophageal reflux disease)    • Heart burn    • Hiatus hernia syndrome    • Hypertension    • Obesity    • Obstructive sleep apnea    • Pain     back, knees   • Sleep apnea     CPAP   • Snoring    • Thrush     from \"Advair\", takes nystatin   • Urinary incontinence        Surgical History  Past Surgical History:   Procedure Laterality Date   • KNEE ARTHROPLASTY TOTAL Right 5/1/2017    Procedure: KNEE ARTHROPLASTY TOTAL;  Surgeon: Jesús Rutledge M.D.;  Location: SURGERY [facility];  Service:    • KNEE ARTHROPLASTY TOTAL Left 3/7/2016    Procedure: KNEE ARTHROPLASTY TOTAL;  Surgeon: Jesús Rutledge M.D.;  Location: SURGERY [facility];  Service:    • LUMBAR FUSION POSTERIOR  9/13/2013    Performed by Kaushal Ayala M.D. at SURGERY [facility]   • LUMBAR LAMINECTOMY DISKECTOMY  9/13/2013    Performed by Kaushal Ayala M.D. at SURGERY [facility]   • LUMBAR LAMINECTOMY DISKECTOMY  5/30/2013    Performed by Kaushal Ayala M.D. at SURGERY [facility]   • CHOLECYSTECTOMY  2010    laparoscopic   • OTHER NEUROLOGICAL SURG  2/2009    I&D of brain from sinus infection   • FORAMINOTOMY  11/26/08    Performed by [facility] at SURGERY [facility]   • HARDWARE REMOVAL NEURO  11/26/08    Performed by [facility] at SURGERY [facility]   • KNEE ARTHROSCOPY Right 2008   • LUMBAR FUSION POSTERIOR  2005   • CARPAL TUNNEL RELEASE Right 2000   • SHOULDER ARTHROTOMY Right 2000       Medications  No current facility-administered medications on file prior to encounter.      Current Outpatient Prescriptions on File Prior to Encounter   Medication " Sig Dispense Refill   • duloxetine (CYMBALTA) 60 MG Cap DR Particles delayed-release capsule Take 60 mg by mouth every day.     • nystatin/triamcinolone (MYCOLOG) 833736-2.1 UNIT/GM-% Cream Apply 1 Application to affected area(s) as needed (For vaginal).     • diphenhydrAMINE (BENADRYL) 50 MG Cap Take 50 mg by mouth every bedtime.     • gabapentin (NEURONTIN) 300 MG CAPS Take 1 Cap by mouth 3 times a day. 90 Cap 0   • fluticasone-salmeterol (ADVAIR) 500-50 MCG/DOSE AEPB Inhale 1 Puff by mouth every 12 hours.     • albuterol (PROAIR HFA) 108 (90 BASE) MCG/ACT AERS Inhale 2 Puffs by mouth every 6 hours as needed for Shortness of Breath. Shortness of breath     • losartan (COZAAR) 100 MG TABS Take 100 mg by mouth every day.     • nystatin (MYCOSTATIN) 128375 UNIT/ML SUSP Take 100,000 Units by mouth 5 Times a Day.     • montelukast (SINGULAIR) 10 MG TABS Take 10 mg by mouth every morning.     • trazodone (DESYREL) 50 MG TABS Take 50 mg by mouth every evening.     • fluticasone (FLONASE) 50 MCG/ACT nasal spray Spray 1 Spray in nose every morning. Each Nostril     • omeprazole (PRILOSEC) 20 MG CPDR Take 20 mg by mouth every day.         Family History  Family History   Problem Relation Age of Onset   • Heart Disease Father    • Hypertension Mother    • Cancer Mother    • Heart Disease Mother    • Heart Disease     • Hypertension     • Stroke     • Lung Disease         Social History  Social History   Substance Use Topics   • Smoking status: Former Smoker     Packs/day: 1.00     Years: 6.00     Types: Cigarettes     Start date: 1/1/1972     Quit date: 1/1/1978   • Smokeless tobacco: Never Used      Comment: 1978   • Alcohol use No       Allergies  Allergies   Allergen Reactions   • Allergy      Unknown antibiotic   • Cefepime Hives   • Cephalosporins Hives   • Clarithromycin Hives   • Cleocin [Clindamycin] Itching   • Meropenem Hives   • Morphine Itching   • Sulfamethoxazole-Trimethoprim Itching        Physical Exam   Laboratory   Hemodynamics  Temp (24hrs), Av.8 °C (98.3 °F), Min:36.4 °C (97.5 °F), Max:37.2 °C (99 °F)   Temperature: 36.4 °C (97.5 °F)  Pulse  Av.9  Min: 70  Max: 83 Heart Rate (Monitored): 73  Blood Pressure : (!) 163/86, NIBP: 148/64      Respiratory      Respiration: 16, Pulse Oximetry: 98 %             Physical Exam   Constitutional: She is oriented to person, place, and time. No distress.   Morbidly obese   HENT:   Head: Normocephalic and atraumatic.   Nose: Nose normal.   Eyes: Conjunctivae and EOM are normal. Pupils are equal, round, and reactive to light. Right eye exhibits no discharge. Left eye exhibits no discharge.   Neck: No tracheal deviation present.   Cardiovascular: Normal rate, regular rhythm, normal heart sounds and intact distal pulses.    No murmur heard.  Pulses:       Radial pulses are 2+ on the right side, and 2+ on the left side.   Pulmonary/Chest: No stridor.   Abdominal: Soft. Bowel sounds are normal. She exhibits no distension. There is no tenderness.   Musculoskeletal: She exhibits no edema.   Lymphadenopathy:     She has no cervical adenopathy.   Neurological: She is alert and oriented to person, place, and time. No cranial nerve deficit.   Skin: Skin is warm. She is not diaphoretic.   Psychiatric: She has a normal mood and affect. Her behavior is normal. Thought content normal.   Vitals reviewed.      Recent Labs      17   1727   WBC  12.0*   RBC  4.39   HEMOGLOBIN  13.5   HEMATOCRIT  39.8   MCV  90.7   MCH  30.8   MCHC  33.9   RDW  46.3   PLATELETCT  246   MPV  9.3     Recent Labs      17   1727   SODIUM  134*   POTASSIUM  3.5*   CHLORIDE  104   CO2  22   GLUCOSE  110*   BUN  14   CREATININE  1.01   CALCIUM  8.4     Recent Labs      17   1727   APTT  26.6   INR  1.02               Imaging  CT head noncontrast : No acute intracranial hemorrhage nor territorial infarct. 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 There is mild chronic sinus disease      Assessment/Plan  Chest x-ray shows no acute cardiopulmonary process    I anticipate this patient is appropriate for observation status at this time.    Hypertensive urgency   Assessment & Plan    Continue outpatient losartan  Add amlodipine  Monitor vitals  As needed IV blood pressure medications  Check TSH  Patient has history of sleep apnea needs ongoing weight loss          Elevated troponin   Assessment & Plan    Indeterminate level. She is asymptomatic. We'll follow serial troponins.  We'll defer myocardial stress testing for outpatient        BEATRIZ (obstructive sleep apnea)- (present on admission)   Assessment & Plan    Uses CPAP with facemask. States her settings are \"14\"        Back pain- (present on admission)   Assessment & Plan    Chronic  Pain management            VTE prophylaxisNone she is ambulatory .    EKG heart rate of 76 normal sinus rhythm or split T waves and only 1 anterior lead        "

## 2017-11-15 NOTE — CARE PLAN
Problem: Safety  Goal: Will remain free from injury  Outcome: PROGRESSING AS EXPECTED    Intervention: Provide assistance with mobility  Encourage to call for any assistance needed, call light within reach, uses cane for ambulation.      Problem: Pain Management  Goal: Pain level will decrease to patient's comfort goal  Outcome: PROGRESSING AS EXPECTED    Intervention: Educate and implement non-pharmacologic comfort measures. Examples: relaxation, distration, play therapy, activity therapy, massage, etc.  Pain assessment q 2 hours medicated as needed, comfort measures provided.

## 2017-11-15 NOTE — CARE PLAN
Problem: Safety  Goal: Will remain free from falls  Outcome: PROGRESSING AS EXPECTED  Pt educated regarding the use of call light when needing assistance. Pt demonstrated and verbalized the proper use of a call light and to call for assistance. Fall precautions in place, treaded slippers on, personal belongings/phone in reach. Pt has a steady gate ambulating with her cane and is encouraged to call if assistance is needed. Bed alarm is set.       Problem: Knowledge Deficit  Goal: Knowledge of disease process/condition, treatment plan, diagnostic tests, and medications will improve  Outcome: PROGRESSING AS EXPECTED  Pt educated regarding her new BP medication (amlodipine), by RN as well as by MD. [facility] handout provided.

## 2017-11-15 NOTE — ASSESSMENT & PLAN NOTE
Continue outpatient losartan  Add amlodipine  Monitor vitals  As needed IV blood pressure medications  Check TSH  Patient has history of sleep apnea needs ongoing weight loss

## 2017-11-15 NOTE — PROGRESS NOTES
Discharge home today, discharge instructions and prescription given, explained and understood, accompanied with  via wheelchair.

## 2017-11-15 NOTE — ASSESSMENT & PLAN NOTE
Indeterminate level. She is asymptomatic. We'll follow serial troponins.  We'll defer myocardial stress testing for outpatient

## 2017-11-15 NOTE — DISCHARGE INSTRUCTIONS
Discharge Instructions    Discharged to home by car with relative. Discharged via wheelchair, hospital escort: Yes.  Special equipment needed: Cane    Be sure to schedule a follow-up appointment with your primary care doctor or any specialists as instructed.     Discharge Plan:   Diet Plan: Discussed  Activity Level: Discussed  Confirmed Follow up Appointment: Patient to Call and Schedule Appointment  Confirmed Symptoms Management: Discussed  Medication Reconciliation Updated: Yes  Influenza Vaccine Indication: Not indicated: Previously immunized this influenza season and > 8 years of age    I understand that a diet low in cholesterol, fat, and sodium is recommended for good health. Unless I have been given specific instructions below for another diet, I accept this instruction as my diet prescription.   Other diet: as tolerated    Special Instructions: Amlodipine tablets  What is this medicine?  AMLODIPINE (am SIERRA mikey pemitra) is a calcium-channel blocker. It affects the amount of calcium found in your heart and muscle cells. This relaxes your blood vessels, which can reduce the amount of work the heart has to do. This medicine is used to lower high blood pressure. It is also used to prevent chest pain.  This medicine may be used for other purposes; ask your health care provider or pharmacist if you have questions.  COMMON BRAND NAME(S): Norvasc  What should I tell my health care provider before I take this medicine?  They need to know if you have any of these conditions:  -heart problems like heart failure or aortic stenosis  -liver disease  -an unusual or allergic reaction to amlodipine, other medicines, foods, dyes, or preservatives  -pregnant or trying to get pregnant  -breast-feeding  How should I use this medicine?  Take this medicine by mouth with a glass of water. Follow the directions on the prescription label. Take your medicine at regular intervals. Do not take more medicine than directed.  Talk to your  pediatrician regarding the use of this medicine in children. Special care may be needed. This medicine has been used in children as young as 6.  Persons over 65 years old may have a stronger reaction to this medicine and need smaller doses.  Overdosage: If you think you have taken too much of this medicine contact a poison control center or emergency room at once.  NOTE: This medicine is only for you. Do not share this medicine with others.  What if I miss a dose?  If you miss a dose, take it as soon as you can. If it is almost time for your next dose, take only that dose. Do not take double or extra doses.  What may interact with this medicine?  -herbal or dietary supplements  -local or general anesthetics  -medicines for high blood pressure  -medicines for prostate problems  -rifampin  This list may not describe all possible interactions. Give your health care provider a list of all the medicines, herbs, non-prescription drugs, or dietary supplements you use. Also tell them if you smoke, drink alcohol, or use illegal drugs. Some items may interact with your medicine.  What should I watch for while using this medicine?  Visit your doctor or health care professional for regular check ups. Check your blood pressure and pulse rate regularly. Ask your health care professional what your blood pressure and pulse rate should be, and when you should contact him or her.  This medicine may make you feel confused, dizzy or lightheaded. Do not drive, use machinery, or do anything that needs mental alertness until you know how this medicine affects you. To reduce the risk of dizzy or fainting spells, do not sit or stand up quickly, especially if you are an older patient. Avoid alcoholic drinks; they can make you more dizzy.  Do not suddenly stop taking amlodipine. Ask your doctor or health care professional how you can gradually reduce the dose.  What side effects may I notice from receiving this medicine?  Side effects that  you should report to your doctor or health care professional as soon as possible:  -allergic reactions like skin rash, itching or hives, swelling of the face, lips, or tongue  -breathing problems  -changes in vision or hearing  -chest pain  -fast, irregular heartbeat  -swelling of legs or ankles  Side effects that usually do not require medical attention (report to your doctor or health care professional if they continue or are bothersome):  -dry mouth  -facial flushing  -nausea, vomiting  -stomach gas, pain  -tired, weak  -trouble sleeping  This list may not describe all possible side effects. Call your doctor for medical advice about side effects. You may report side effects to FDA at 0-616-GHU-9943.  Where should I keep my medicine?  Keep out of the reach of children.  Store at room temperature between 59 and 86 degrees F (15 and 30 degrees C). Protect from light. Keep container tightly closed. Throw away any unused medicine after the expiration date.  NOTE: This sheet is a summary. It may not cover all possible information. If you have questions about this medicine, talk to your doctor, pharmacist, or health care provider.  © 2014, Elsevier/[employer] Standard. (11/15/2013 11:40:58 AM)  Managing Your High Blood Pressure  Blood pressure is a measurement of how forceful your blood is pressing against the walls of the arteries. Arteries are muscular tubes within the circulatory system. Blood pressure does not stay the same. Blood pressure rises when you are active, excited, or nervous; and it lowers during sleep and relaxation. If the numbers measuring your blood pressure stay above normal most of the time, you are at risk for health problems. High blood pressure (hypertension) is a long-term (chronic) condition in which blood pressure is elevated.  A blood pressure reading is recorded as two numbers, such as 120 over 80 (or 120/80). The first, higher number is called the systolic pressure. It is a measure of the  pressure in your arteries as the heart beats. The second, lower number is called the diastolic pressure. It is a measure of the pressure in your arteries as the heart relaxes between beats.   Keeping your blood pressure in a normal range is important to your overall health and prevention of health problems, such as heart disease and stroke. When your blood pressure is uncontrolled, your heart has to work harder than normal. High blood pressure is a very common condition in adults because blood pressure tends to rise with age. Men and women are equally likely to have hypertension but at different times in life. Before age 45, men are more likely to have hypertension. After 65 years of age, women are more likely to have it. Hypertension is especially common in  Americans. This condition often has no signs or symptoms. The cause of the condition is usually not known. Your caregiver can help you come up with a plan to keep your blood pressure in a normal, healthy range.  BLOOD PRESSURE STAGES  Blood pressure is classified into four stages: normal, prehypertension, stage 1, and stage 2. Your blood pressure reading will be used to determine what type of treatment, if any, is necessary. Appropriate treatment options are tied to these four stages:   Normal  · Systolic pressure (mm Hg): below 120.  · Diastolic pressure (mm Hg): below 80.  Prehypertension  · Systolic pressure (mm Hg): 120 to 139.  · Diastolic pressure (mm Hg): 80 to 89.  Stage 1  · Systolic pressure (mm Hg): 140 to 159.  · Diastolic pressure (mm Hg): 90 to 99.  Stage 2  · Systolic pressure (mm Hg): 160 or above.  · Diastolic pressure (mm Hg): 100 or above.  RISKS RELATED TO HIGH BLOOD PRESSURE  Managing your blood pressure is an important responsibility. Uncontrolled high blood pressure can lead to:  · A heart attack.  · A stroke.  · A weakened blood vessel (aneurysm).  · Heart failure.  · Kidney damage.  · Eye damage.  · Metabolic syndrome.  · Memory  and concentration problems.  HOW TO MANAGE YOUR BLOOD PRESSURE  Blood pressure can be managed effectively with lifestyle changes and medicines (if needed). Your caregiver will help you come up with a plan to bring your blood pressure within a normal range. Your plan should include the following:  Education  · Read all information provided by your caregivers about how to control blood pressure.  · Educate yourself on the latest guidelines and treatment recommendations. New research is always being done to further define the risks and treatments for high blood pressure.  Lifestyle changes  · Control your weight.  · Avoid smoking.  · Stay physically active.  · Reduce the amount of salt in your diet.  · Reduce stress.  · Control any chronic conditions, such as high cholesterol or diabetes.  · Reduce your alcohol intake.  Medicines  · Several medicines (antihypertensive medicines) are available, if needed, to bring blood pressure within a normal range.  Communication  · Review all the medicines you take with your caregiver because there may be side effects or interactions.  · Talk with your caregiver about your diet, exercise habits, and other lifestyle factors that may be contributing to high blood pressure.  · See your caregiver regularly. Your caregiver can help you create and adjust your plan for managing high blood pressure.  RECOMMENDATIONS FOR TREATMENT AND FOLLOW-UP   The following recommendations are based on current guidelines for managing high blood pressure in nonpregnant adults. Use these recommendations to identify the proper follow-up period or treatment option based on your blood pressure reading. You can discuss these options with your caregiver.  · Systolic pressure of 120 to 139 or diastolic pressure of 80 to 89: Follow up with your caregiver as directed.  · Systolic pressure of 140 to 160 or diastolic pressure of 90 to 100: Follow up with your caregiver within 2 months.  · Systolic pressure above 160  or diastolic pressure above 100: Follow up with your caregiver within 1 month.  · Systolic pressure above 180 or diastolic pressure above 110: Consider antihypertensive therapy; follow up with your caregiver within 1 week.  · Systolic pressure above 200 or diastolic pressure above 120: Begin antihypertensive therapy; follow up with your caregiver within 1 week.     This information is not intended to replace advice given to you by your health care provider. Make sure you discuss any questions you have with your health care provider.     Document Released: 09/11/2013 Document Reviewed: 09/11/2013  [employer] Interactive Patient Education ©2016 [employer] Inc.    Hypertension  Hypertension, commonly called high blood pressure, is when the force of blood pumping through your arteries is too strong. Your arteries are the blood vessels that carry blood from your heart throughout your body. A blood pressure reading consists of a higher number over a lower number, such as 110/72. The higher number (systolic) is the pressure inside your arteries when your heart pumps. The lower number (diastolic) is the pressure inside your arteries when your heart relaxes. Ideally you want your blood pressure below 120/80.  Hypertension forces your heart to work harder to pump blood. Your arteries may become narrow or stiff. Having untreated or uncontrolled hypertension can cause heart attack, stroke, kidney disease, and other problems.  RISK FACTORS  Some risk factors for high blood pressure are controllable. Others are not.   Risk factors you cannot control include:   · Race. You may be at higher risk if you are .  · Age. Risk increases with age.  · Gender. Men are at higher risk than women before age 45 years. After age 65, women are at higher risk than men.  Risk factors you can control include:  · Not getting enough exercise or physical activity.  · Being overweight.  · Getting too much fat, sugar, calories, or salt in  your diet.  · Drinking too much alcohol.  SIGNS AND SYMPTOMS  Hypertension does not usually cause signs or symptoms. Extremely high blood pressure (hypertensive crisis) may cause headache, anxiety, shortness of breath, and nosebleed.  DIAGNOSIS  To check if you have hypertension, your health care provider will measure your blood pressure while you are seated, with your arm held at the level of your heart. It should be measured at least twice using the same arm. Certain conditions can cause a difference in blood pressure between your right and left arms. A blood pressure reading that is higher than normal on one occasion does not mean that you need treatment. If it is not clear whether you have high blood pressure, you may be asked to return on a different day to have your blood pressure checked again. Or, you may be asked to monitor your blood pressure at home for 1 or more weeks.  TREATMENT  Treating high blood pressure includes making lifestyle changes and possibly taking medicine. Living a healthy lifestyle can help lower high blood pressure. You may need to change some of your habits.  Lifestyle changes may include:  · Following the DASH diet. This diet is high in fruits, vegetables, and whole grains. It is low in salt, red meat, and added sugars.  · Keep your sodium intake below 2,300 mg per day.  · Getting at least 30-45 minutes of aerobic exercise at least 4 times per week.  · Losing weight if necessary.  · Not smoking.  · Limiting alcoholic beverages.  · Learning ways to reduce stress.  Your health care provider may prescribe medicine if lifestyle changes are not enough to get your blood pressure under control, and if one of the following is true:  · You are 18-59 years of age and your systolic blood pressure is above 140.  · You are 60 years of age or older, and your systolic blood pressure is above 150.  · Your diastolic blood pressure is above 90.  · You have diabetes, and your systolic blood pressure is  over 140 or your diastolic blood pressure is over 90.  · You have kidney disease and your blood pressure is above 140/90.  · You have heart disease and your blood pressure is above 140/90.  Your personal target blood pressure may vary depending on your medical conditions, your age, and other factors.  HOME CARE INSTRUCTIONS  · Have your blood pressure rechecked as directed by your health care provider.    · Take medicines only as directed by your health care provider. Follow the directions carefully. Blood pressure medicines must be taken as prescribed. The medicine does not work as well when you skip doses. Skipping doses also puts you at risk for problems.  · Do not smoke.    · Monitor your blood pressure at home as directed by your health care provider.   SEEK MEDICAL CARE IF:   · You think you are having a reaction to medicines taken.  · You have recurrent headaches or feel dizzy.  · You have swelling in your ankles.  · You have trouble with your vision.  SEEK IMMEDIATE MEDICAL CARE IF:  · You develop a severe headache or confusion.  · You have unusual weakness, numbness, or feel faint.  · You have severe chest or abdominal pain.  · You vomit repeatedly.  · You have trouble breathing.  MAKE SURE YOU:   · Understand these instructions.  · Will watch your condition.  · Will get help right away if you are not doing well or get worse.     This information is not intended to replace advice given to you by your health care provider. Make sure you discuss any questions you have with your health care provider.     Document Released: 12/18/2006 Document Revised: 05/03/2016 Document Reviewed: 10/10/2014  Elsevier Interactive Patient Education ©2016 [employer] Inc.      · Is patient discharged on Warfarin / Coumadin?   No     · Is patient Post Blood Transfusion?  No    Depression / Suicide Risk    As you are discharged from this [facility] facility, it is important to learn how to keep safe from harming  yourself.    Recognize the warning signs:  · Abrupt changes in personality, positive or negative- including increase in energy   · Giving away possessions  · Change in eating patterns- significant weight changes-  positive or negative  · Change in sleeping patterns- unable to sleep or sleeping all the time   · Unwillingness or inability to communicate  · Depression  · Unusual sadness, discouragement and loneliness  · Talk of wanting to die  · Neglect of personal appearance   · Rebelliousness- reckless behavior  · Withdrawal from people/activities they love  · Confusion- inability to concentrate     If you or a loved one observes any of these behaviors or has concerns about self-harm, here's what you can do:  · Talk about it- your feelings and reasons for harming yourself  · Remove any means that you might use to hurt yourself (examples: pills, rope, extension cords, firearm)  · Get professional help from the community (Mental Health, Substance Abuse, psychological counseling)  · Do not be alone:Call your Safe Contact- someone whom you trust who will be there for you.  · Call your local CRISIS HOTLINE 954-9038 or 190-846-1667  · Call your local Children's Mobile Crisis Response Team [place] (861) 680-7545 or www.[employer]  · Call the toll free National Suicide Prevention Hotlines   · National Suicide Prevention Lifeline 952-397-NCZW (3876)  · National Hope Line Network 800-SUICIDE (299-3325)

## 2017-11-15 NOTE — PROGRESS NOTES
Pt transferred from ED to room 311-2. Able to ambulate from gurney to bed with use of cane and SBA. Dr. Serrano speaking with pt immediatly. BP is trending down, but remains elevated (163/86)-- amlodipine given. Admit profile and med rec updated. Pt denies current pain-- RT at BS to assist placing pt on CPAP.  Now in bed, CLIP, fall precautions in place (bed alarm is set), will CTM.

## 2017-11-15 NOTE — ED NOTES
Pt updated on POC and bed assignment. Family at the bedside. Pt requesting to take medication. Pt instructed not to take any medication and to ask the hospitalist when he comes in. No other needs at this time.      Rm 311-2 is currently dirty. Pt will be transferred when notified that room is clean.

## 2017-11-15 NOTE — PROGRESS NOTES
Pt assisted to reposition her CPAP, denies other needs. Call light is in reach and BA is set.     Telemetry Report: pt has been SR.  HR: 60s-70s  Measurements: (18/08/40)  Ectopy: rare PVC    Measurements and updates per Nimisha SLATER.

## 2017-11-15 NOTE — ED NOTES
"Pt worried that BP is high. Pt instructed to relax and not to talk during bp check. BP significantly lower than last reading. Pt states she still has a headache and she feels like her \"eyes are bugging out of her head\"  "

## 2017-11-16 ENCOUNTER — PATIENT OUTREACH (OUTPATIENT)
Dept: HEALTH INFORMATION MANAGEMENT | Facility: OTHER | Age: 72
End: 2017-11-16

## 2017-11-17 ENCOUNTER — OFFICE VISIT (OUTPATIENT)
Dept: MEDICAL GROUP | Facility: CLINIC | Age: 72
End: 2017-11-17
Payer: MEDICARE

## 2017-11-17 VITALS
HEIGHT: 62 IN | TEMPERATURE: 97.5 F | OXYGEN SATURATION: 96 % | WEIGHT: 246 LBS | BODY MASS INDEX: 45.27 KG/M2 | RESPIRATION RATE: 16 BRPM | DIASTOLIC BLOOD PRESSURE: 70 MMHG | HEART RATE: 90 BPM | SYSTOLIC BLOOD PRESSURE: 140 MMHG

## 2017-11-17 DIAGNOSIS — J32.0 CHRONIC MAXILLARY SINUSITIS: ICD-10-CM

## 2017-11-17 DIAGNOSIS — I16.0 HYPERTENSIVE URGENCY: ICD-10-CM

## 2017-11-17 DIAGNOSIS — Z09 HOSPITAL DISCHARGE FOLLOW-UP: ICD-10-CM

## 2017-11-17 PROCEDURE — 99496 TRANSJ CARE MGMT HIGH F2F 7D: CPT | Performed by: NURSE PRACTITIONER

## 2017-11-17 RX ORDER — LEVOFLOXACIN 500 MG/1
500 TABLET, FILM COATED ORAL DAILY
Qty: 10 TAB | Refills: 0 | Status: SHIPPED | OUTPATIENT
Start: 2017-11-17 | End: 2017-11-27

## 2017-11-17 ASSESSMENT — PATIENT HEALTH QUESTIONNAIRE - PHQ9: CLINICAL INTERPRETATION OF PHQ2 SCORE: 0

## 2017-11-17 ASSESSMENT — ENCOUNTER SYMPTOMS
SPUTUM PRODUCTION: 0
NAUSEA: 0
FALLS: 0
CHILLS: 0
NERVOUS/ANXIOUS: 0
HEARTBURN: 0
COUGH: 0
FOCAL WEAKNESS: 0
DIZZINESS: 0
PALPITATIONS: 0
MYALGIAS: 0
SINUS PAIN: 0
WHEEZING: 0
WEAKNESS: 0
SHORTNESS OF BREATH: 0
HEADACHES: 0
BLURRED VISION: 0
VOMITING: 0
ABDOMINAL PAIN: 0
DEPRESSION: 0
FEVER: 0

## 2017-11-17 NOTE — PATIENT INSTRUCTIONS
If you need further assistance, or have any questions; concerns or lingering symptoms before seeing your Primary Care Provider or specialist.     Do not hesitate to contact us.     Please contact us at the Post-Hospital Follow Up Program at [phone].   Our offices hours are Monday-Friday 8 am-5 pm.

## 2017-11-17 NOTE — PROGRESS NOTES
POST DISCHARGE CALL MADE BY Yuliya Christianson.   Discharge Date:11/15/2017   Date of Outreach Call: 11/16/2017  9:48 AM  Now that you're home, how are you doing? *  Do you have questions about your medications? No    Did you fill your medications? Yes    Do you have a follow-up appointment scheduled?Yes    Discharging Department: [facility]    Number of Attempts: 1  Current or previous attempts completed within two business days of discharge? Yes  Provided education regarding treatment plan, medication, self-management, ADLs? Yes  Comment:Encouraged patient to continue monitoring blood  pressure readings and bring log to appointment.  Has patient completed Advance Directive? If yes, advise them to bring to appointment. No  Care Manager phone number provided? Yes  Is there anything else I can help you with? No

## 2017-11-17 NOTE — PROGRESS NOTES
"Subjective:     Cyndy Petit is a 71 y.o. female who presents for Hospital Follow-up.  Chart reviewed. Discharge summary available for review: Yes   Date of discharge 11/15/2017.  48- hour post discharge RN call completed on 11/16/2017 and documented in the medical record by Yuliya Christianson. .    HPI: Recently hospitalized for elevated blood pressure (> 180 sbp) and troponin.    Hypertensive urgency  Resolved.   Since returning home, patient reports she is feeling better. She was actually having epidural the day before she went to the hospital. She is not sure if that is related but other than that she does not recall any recent change. However, before she went to urgent care on 11/14 she has already noticed her blood pressure gradually went up in the past 7 days. She is thought to have rebound hypertension secondary to Decadron. She was sent to hospital and was discharged with norvasc 5 mg daily addition to her home BP med, losartan 100 mg daily. Pt is actually also having chronic sinus pressure and not sure if her \"no feeling well\" is related to sinus infection. She has several sinus infection s/p surgery and scheduled to have another one in December.      The patient denied weakness; no difficulty taking care of self at home.  Patient reports taking medications as instructed.    PCP appointment next Monday.         Allergies:   Allergy; Cefepime; Cephalosporins; Clarithromycin; Cleocin [clindamycin]; Meropenem; Morphine; and Sulfamethoxazole-trimethoprim    Social History:  Social History   Substance Use Topics   • Smoking status: Former Smoker     Packs/day: 1.00     Years: 6.00     Types: Cigarettes     Start date: 1/1/1972     Quit date: 1/1/1978   • Smokeless tobacco: Never Used      Comment: 1978   • Alcohol use No        ROS:  Review of Systems   Constitutional: Positive for malaise/fatigue. Negative for chills and fever.   HENT: Negative for hearing loss, sinus pain (no pain but pressure to forehead " "and she is not sure if that is related to CPAP she wears or not) and tinnitus.    Eyes: Negative for blurred vision.   Respiratory: Negative for cough, sputum production, shortness of breath and wheezing.    Cardiovascular: Negative for chest pain, palpitations and leg swelling.   Gastrointestinal: Negative for abdominal pain, heartburn, nausea and vomiting.   Genitourinary: Negative for dysuria, frequency and urgency.   Musculoskeletal: Negative for falls and myalgias.   Skin: Negative for rash.   Neurological: Negative for dizziness, focal weakness, weakness and headaches.   Psychiatric/Behavioral: Negative for depression. The patient is not nervous/anxious.         Objective:     Blood pressure 140/70, pulse 90, temperature 36.4 °C (97.5 °F), resp. rate 16, height 1.575 m (5' 2\"), weight 111.6 kg (246 lb), SpO2 96 %.     Physical Exam:  Physical Exam   Constitutional: She is oriented to person, place, and time and well-developed, well-nourished, and in no distress.   HENT:   Head: Normocephalic and atraumatic.   Nose: Right sinus exhibits no maxillary sinus tenderness and no frontal sinus tenderness. Left sinus exhibits no maxillary sinus tenderness and no frontal sinus tenderness ( but feels soem pressure to frontal area).   Mouth/Throat: No oropharyngeal exudate, posterior oropharyngeal edema, posterior oropharyngeal erythema or tonsillar abscesses.   Eyes: Conjunctivae are normal.   Neck: Neck supple. No JVD present. No thyromegaly present.   Cardiovascular: Normal rate and regular rhythm.    No murmur heard.  Pulmonary/Chest: Effort normal and breath sounds normal. No respiratory distress. She has no wheezes.   Abdominal: Soft. Bowel sounds are normal. She exhibits no distension. There is no tenderness.   Musculoskeletal: Normal range of motion. She exhibits no edema.   Neurological: She is alert and oriented to person, place, and time.   Skin: Skin is warm. No erythema.   Nursing note and vitals " reviewed.    11/14 ct of head  1.  There is no acute intracranial hemorrhage or territorial infarct.  2.  There are postoperative changes of bilateral temporal craniotomy and partial mastoidectomy.  3.  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  4.  There is mild chronic sinus disease, maxillary    Assessment and Plan:     1. Hospital discharge follow-up  Hospitalization and results reviewed with patient. High risk conditions requiring teaching or care coordination were identified and addressed.The patient demonstrate understanding of admission and underlying conditions. The patient understands discharge instructions and when to seek medical attention. Medications reviewed including instructions regarding high risk medications, dosing and side effects.    The patient is able to safely adhere to ADL/IADL, treatment and medication regimen, self-manage of high-risk conditions? Yes   The patient requires physical therapy/home health/DME referral? No   The patient requires referral to care coordination/behavioral health/social work?  No   Patient requires referral for pharmacy consult? No   Advance directive/POLST on file?  No   Required counseled on advance directive?  No     2. Chronic maxillary sinusitis  Back up abx sent to pharmacy in case worsening symptoms overweekend. If pt is feeling better, no need to take it. Follow up with ENT.   - levofloxacin (LEVAQUIN) 500 MG tablet; Take 1 Tab by mouth every day for 10 days.  Dispense: 10 Tab; Refill: 0    3. Hypertensive urgency  Resolved. Continue on losartan and norvasc for now. BP controlled better but pt to start doing BP log until seeing PCP next Monday. If BP drops too much, hold BP medication if SBP < 100.       Medication Reconciliation  Medication list at end of encounter:   Current Outpatient Prescriptions   Medication Sig Dispense Refill   • levofloxacin (LEVAQUIN) 500 MG tablet Take 1 Tab by mouth every day for 10 days. 10 Tab 0   • amlodipine  (NORVASC) 5 MG Tab Take 1 Tab by mouth every day for 15 days. 15 Tab 0   • guaifenesin LA (MUCINEX) 600 MG TABLET SR 12 HR Take 600 mg by mouth every 12 hours.     • docusate sodium (COLACE) 100 MG Cap Take 100 mg by mouth 2 times a day.     • hydrocodone-acetaminophen (NORCO) 5-325 MG Tab per tablet Take 1-2 Tabs by mouth 3 times a day.     • duloxetine (CYMBALTA) 60 MG Cap DR Particles delayed-release capsule Take 60 mg by mouth every day.     • nystatin/triamcinolone (MYCOLOG) 039045-3.1 UNIT/GM-% Cream Apply 1 Application to affected area(s) as needed (For vaginal).     • diphenhydrAMINE (BENADRYL) 50 MG Cap Take 50 mg by mouth every bedtime.     • gabapentin (NEURONTIN) 300 MG CAPS Take 1 Cap by mouth 3 times a day. 90 Cap 0   • fluticasone-salmeterol (ADVAIR) 500-50 MCG/DOSE AEPB Inhale 1 Puff by mouth every 12 hours.     • albuterol (PROAIR HFA) 108 (90 BASE) MCG/ACT AERS Inhale 2 Puffs by mouth every 6 hours as needed for Shortness of Breath. Shortness of breath     • losartan (COZAAR) 100 MG TABS Take 100 mg by mouth every day.     • nystatin (MYCOSTATIN) 612520 UNIT/ML SUSP Take 100,000 Units by mouth 5 Times a Day.     • montelukast (SINGULAIR) 10 MG TABS Take 10 mg by mouth every morning.     • trazodone (DESYREL) 50 MG TABS Take 50 mg by mouth every evening.     • fluticasone (FLONASE) 50 MCG/ACT nasal spray Spray 1 Spray in nose every morning. Each Nostril     • omeprazole (PRILOSEC) 20 MG CPDR Take 20 mg by mouth every day.       No current facility-administered medications for this visit.        Primary care follow-up:  New health conditions identified during hospitalization? Yes   Labs/pathology/imaging requires future PCP follow-up?  No   Changes to medications during hospitalization or today? Yes during hospitalization    Recommended followup: No Follow-up on file. with Sultana Browne M.D.       Patient Instruction  Patient offered educational material on discharge diagnosis and management  of symptoms/red flags. Patient instructed to keep follow-up appointments and to bring written questions and and actual medications to each office visit. Patient instructed to call PCP/specialist with any problems/questions/concerns. Patient verbalizes understanding and has no further questions at this time.    Face-to-face transitional care management services with moderate complexity medical decision making.

## 2017-11-17 NOTE — ASSESSMENT & PLAN NOTE
"Resolved.   Since returning home, patient reports she is feeling better. She was actually having epidural the day before she went to the hospital. She is not sure if that is related but other than that she does not recall any recent change. However, before she went to urgent care on 11/14 she has already noticed her blood pressure gradually went up in the past 7 days. She is thought to have rebound hypertension secondary to Decadron. She was sent to hospital and was discharged with norvasc 5 mg daily addition to her home BP med, losartan 100 mg daily. Pt is actually also having chronic sinus pressure and not sure if her \"no feeling well\" is related to sinus infection. She has several sinus infection s/p surgery and scheduled to have another one in December.      The patient denied weakness; no difficulty taking care of self at home.  Patient reports taking medications as instructed.    PCP appointment next Monday.   "

## 2017-11-30 ENCOUNTER — HOSPITAL ENCOUNTER (OUTPATIENT)
Dept: RADIOLOGY | Facility: MEDICAL CENTER | Age: 72
End: 2017-11-30
Attending: FAMILY MEDICINE
Payer: MEDICARE

## 2017-11-30 DIAGNOSIS — I10 ESSENTIAL HYPERTENSION, BENIGN: ICD-10-CM

## 2017-11-30 PROCEDURE — 700111 HCHG RX REV CODE 636 W/ 250 OVERRIDE (IP)

## 2017-11-30 PROCEDURE — A9502 TC99M TETROFOSMIN: HCPCS

## 2017-11-30 RX ORDER — REGADENOSON 0.08 MG/ML
INJECTION, SOLUTION INTRAVENOUS
Status: COMPLETED
Start: 2017-11-30 | End: 2017-11-30

## 2017-11-30 RX ADMIN — REGADENOSON 0.4 MG: 0.08 INJECTION, SOLUTION INTRAVENOUS at 09:44

## 2017-11-30 NOTE — PROGRESS NOTES
Nursing care plan includes knowledge deficit, potential for discomfort, potential for compromised cardiac output. POC includes teaching, comfort measures and reassurance, and access to code cart, cardiology stand by and availability of rapid response team. Pt verbalizes good understanding of benefits and risks of pharmacological cardiac stress test. Informed consent obtained. Lexiscan given, pt developed the following symptoms SOB, and flushing with nausea in recovery stage. VS stable, major symptoms resolved. To waiting room, caffeinated fluids and/or snacks given, awaiting second scan. Nursing goals met.

## 2018-02-14 ENCOUNTER — HOSPITAL ENCOUNTER (OUTPATIENT)
Dept: LAB | Facility: MEDICAL CENTER | Age: 73
End: 2018-02-14
Attending: FAMILY MEDICINE
Payer: MEDICARE

## 2018-02-14 LAB
ALBUMIN SERPL BCP-MCNC: 3.7 G/DL (ref 3.2–4.9)
ALBUMIN/GLOB SERPL: 1.2 G/DL
ALP SERPL-CCNC: 76 U/L (ref 30–99)
ALT SERPL-CCNC: 15 U/L (ref 2–50)
ANION GAP SERPL CALC-SCNC: 9 MMOL/L (ref 0–11.9)
APPEARANCE UR: CLEAR
AST SERPL-CCNC: 23 U/L (ref 12–45)
BACTERIA #/AREA URNS HPF: NEGATIVE /HPF
BASOPHILS # BLD AUTO: 0.9 % (ref 0–1.8)
BASOPHILS # BLD: 0.07 K/UL (ref 0–0.12)
BILIRUB SERPL-MCNC: 0.4 MG/DL (ref 0.1–1.5)
BILIRUB UR QL STRIP.AUTO: NEGATIVE
BUN SERPL-MCNC: 22 MG/DL (ref 8–22)
CALCIUM SERPL-MCNC: 8.8 MG/DL (ref 8.5–10.5)
CHLORIDE SERPL-SCNC: 103 MMOL/L (ref 96–112)
CO2 SERPL-SCNC: 25 MMOL/L (ref 20–33)
COLOR UR: YELLOW
CREAT SERPL-MCNC: 0.94 MG/DL (ref 0.5–1.4)
CULTURE IF INDICATED INDCX: YES UA CULTURE
EOSINOPHIL # BLD AUTO: 0.57 K/UL (ref 0–0.51)
EOSINOPHIL NFR BLD: 7.2 % (ref 0–6.9)
EPI CELLS #/AREA URNS HPF: ABNORMAL /HPF
ERYTHROCYTE [DISTWIDTH] IN BLOOD BY AUTOMATED COUNT: 50.3 FL (ref 35.9–50)
GLOBULIN SER CALC-MCNC: 3.2 G/DL (ref 1.9–3.5)
GLUCOSE SERPL-MCNC: 81 MG/DL (ref 65–99)
GLUCOSE UR STRIP.AUTO-MCNC: NEGATIVE MG/DL
HCT VFR BLD AUTO: 41.7 % (ref 37–47)
HGB BLD-MCNC: 13.3 G/DL (ref 12–16)
HYALINE CASTS #/AREA URNS LPF: ABNORMAL /LPF
IMM GRANULOCYTES # BLD AUTO: 0.02 K/UL (ref 0–0.11)
IMM GRANULOCYTES NFR BLD AUTO: 0.3 % (ref 0–0.9)
KETONES UR STRIP.AUTO-MCNC: NEGATIVE MG/DL
LEUKOCYTE ESTERASE UR QL STRIP.AUTO: ABNORMAL
LYMPHOCYTES # BLD AUTO: 1.93 K/UL (ref 1–4.8)
LYMPHOCYTES NFR BLD: 24.2 % (ref 22–41)
MCH RBC QN AUTO: 30.5 PG (ref 27–33)
MCHC RBC AUTO-ENTMCNC: 31.9 G/DL (ref 33.6–35)
MCV RBC AUTO: 95.6 FL (ref 81.4–97.8)
MICRO URNS: ABNORMAL
MONOCYTES # BLD AUTO: 0.7 K/UL (ref 0–0.85)
MONOCYTES NFR BLD AUTO: 8.8 % (ref 0–13.4)
NEUTROPHILS # BLD AUTO: 4.67 K/UL (ref 2–7.15)
NEUTROPHILS NFR BLD: 58.6 % (ref 44–72)
NITRITE UR QL STRIP.AUTO: NEGATIVE
NRBC # BLD AUTO: 0 K/UL
NRBC BLD-RTO: 0 /100 WBC
PH UR STRIP.AUTO: 6 [PH]
PLATELET # BLD AUTO: 245 K/UL (ref 164–446)
PMV BLD AUTO: 10.6 FL (ref 9–12.9)
POTASSIUM SERPL-SCNC: 4.1 MMOL/L (ref 3.6–5.5)
PROT SERPL-MCNC: 6.9 G/DL (ref 6–8.2)
PROT UR QL STRIP: NEGATIVE MG/DL
RBC # BLD AUTO: 4.36 M/UL (ref 4.2–5.4)
RBC # URNS HPF: ABNORMAL /HPF
RBC UR QL AUTO: NEGATIVE
SODIUM SERPL-SCNC: 137 MMOL/L (ref 135–145)
SP GR UR STRIP.AUTO: 1.02
TSH SERPL DL<=0.005 MIU/L-ACNC: 1.57 UIU/ML (ref 0.38–5.33)
UROBILINOGEN UR STRIP.AUTO-MCNC: 1 MG/DL
WBC # BLD AUTO: 8 K/UL (ref 4.8–10.8)
WBC #/AREA URNS HPF: ABNORMAL /HPF

## 2018-02-14 PROCEDURE — 80053 COMPREHEN METABOLIC PANEL: CPT

## 2018-02-14 PROCEDURE — 81001 URINALYSIS AUTO W/SCOPE: CPT

## 2018-02-14 PROCEDURE — 87086 URINE CULTURE/COLONY COUNT: CPT

## 2018-02-14 PROCEDURE — 85025 COMPLETE CBC W/AUTO DIFF WBC: CPT

## 2018-02-14 PROCEDURE — 36415 COLL VENOUS BLD VENIPUNCTURE: CPT

## 2018-02-14 PROCEDURE — 84443 ASSAY THYROID STIM HORMONE: CPT

## 2018-02-16 LAB
BACTERIA UR CULT: NORMAL
SIGNIFICANT IND 70042: NORMAL
SITE SITE: NORMAL
SOURCE SOURCE: NORMAL

## 2018-02-22 ENCOUNTER — HOSPITAL ENCOUNTER (OUTPATIENT)
Dept: LAB | Facility: MEDICAL CENTER | Age: 73
End: 2018-02-22
Attending: FAMILY MEDICINE
Payer: MEDICARE

## 2018-02-22 LAB
APPEARANCE UR: CLEAR
BACTERIA #/AREA URNS HPF: NEGATIVE /HPF
BILIRUB UR QL STRIP.AUTO: NEGATIVE
COLOR UR: ABNORMAL
CULTURE IF INDICATED INDCX: YES UA CULTURE
EPI CELLS #/AREA URNS HPF: ABNORMAL /HPF
GLUCOSE UR STRIP.AUTO-MCNC: NEGATIVE MG/DL
HYALINE CASTS #/AREA URNS LPF: ABNORMAL /LPF
KETONES UR STRIP.AUTO-MCNC: NEGATIVE MG/DL
LEUKOCYTE ESTERASE UR QL STRIP.AUTO: ABNORMAL
MICRO URNS: ABNORMAL
NITRITE UR QL STRIP.AUTO: NEGATIVE
PH UR STRIP.AUTO: 7 [PH]
PROT UR QL STRIP: NEGATIVE MG/DL
RBC # URNS HPF: ABNORMAL /HPF
RBC UR QL AUTO: NEGATIVE
SP GR UR STRIP.AUTO: 1.02
UROBILINOGEN UR STRIP.AUTO-MCNC: 1 MG/DL
WBC #/AREA URNS HPF: ABNORMAL /HPF

## 2018-02-22 PROCEDURE — 87086 URINE CULTURE/COLONY COUNT: CPT

## 2018-02-22 PROCEDURE — 81001 URINALYSIS AUTO W/SCOPE: CPT

## 2018-02-24 ENCOUNTER — OFFICE VISIT (OUTPATIENT)
Dept: URGENT CARE | Facility: PHYSICIAN GROUP | Age: 73
End: 2018-02-24
Payer: MEDICARE

## 2018-02-24 VITALS
HEIGHT: 62 IN | BODY MASS INDEX: 46.48 KG/M2 | OXYGEN SATURATION: 96 % | WEIGHT: 252.6 LBS | HEART RATE: 84 BPM | TEMPERATURE: 98.5 F | SYSTOLIC BLOOD PRESSURE: 150 MMHG | DIASTOLIC BLOOD PRESSURE: 90 MMHG

## 2018-02-24 DIAGNOSIS — H11.32 SUBCONJUNCTIVAL HEMORRHAGE OF LEFT EYE: ICD-10-CM

## 2018-02-24 LAB
BACTERIA UR CULT: NORMAL
SIGNIFICANT IND 70042: NORMAL
SITE SITE: NORMAL
SOURCE SOURCE: NORMAL

## 2018-02-24 PROCEDURE — 99214 OFFICE O/P EST MOD 30 MIN: CPT | Performed by: PHYSICIAN ASSISTANT

## 2018-02-24 RX ORDER — OFLOXACIN 3 MG/ML
1 SOLUTION/ DROPS OPHTHALMIC 4 TIMES DAILY
Qty: 1 BOTTLE | Refills: 0 | Status: SHIPPED | OUTPATIENT
Start: 2018-02-24 | End: 2018-03-03

## 2018-02-24 ASSESSMENT — ENCOUNTER SYMPTOMS
EYE REDNESS: 1
NEUROLOGICAL NEGATIVE: 1
PSYCHIATRIC NEGATIVE: 1
MUSCULOSKELETAL NEGATIVE: 1
GASTROINTESTINAL NEGATIVE: 1
RESPIRATORY NEGATIVE: 1
CARDIOVASCULAR NEGATIVE: 1
CONSTITUTIONAL NEGATIVE: 1

## 2018-02-25 NOTE — PATIENT INSTRUCTIONS
Subconjunctival Hemorrhage  A subconjunctival hemorrhage is a bright red patch covering a portion of the white of the eye. The white part of the eye is called the sclera, and it is covered by a thin membrane called the conjunctiva. This membrane is clear, except for tiny blood vessels that you can see with the naked eye. When your eye is irritated or inflamed and becomes red, it is because the vessels in the conjunctiva are swollen.  Sometimes, a blood vessel in the conjunctiva can break and bleed. When this occurs, the blood builds up between the conjunctiva and the sclera, and spreads out to create a red area. The red spot may be very small at first. It may then spread to cover a larger part of the surface of the eye, or even all of the visible white part of the eye.  In almost all cases, the blood will go away and the eye will become white again. Before completely dissolving, however, the red area may spread. It may also become brownish-yellow in color before going away. If a lot of blood collects under the conjunctiva, it may look like a bulge on the surface of the eye. This looks scary, but it will also eventually flatten out and go away. Subconjunctival hemorrhages do not cause pain, but if swollen, may cause a feeling of irritation. There is no effect on vision.   CAUSES   · The most common cause is mild trauma (rubbing the eye, irritation).  · Subconjunctival hemorrhages can happen because of coughing or straining (lifting heavy objects), vomiting, or sneezing.  · In some cases, your doctor may want to check your blood pressure. High blood pressure can also cause a subconjunctival hemorrhage.  · Severe trauma or blunt injuries.  · Diseases that affect blood clotting (hemophilia, leukemia).  · Abnormalities of blood vessels behind the eye (carotid cavernous sinus fistula).  · Tumors behind the eye.  · Certain drugs (aspirin, Coumadin, heparin).  · Recent eye surgery.  HOME CARE INSTRUCTIONS   · Do not worry  about the appearance of your eye. You may continue your usual activities.  · Often, follow-up is not necessary.  SEEK MEDICAL CARE IF:   · Your eye becomes painful.  · The bleeding does not disappear within 3 weeks.  · Bleeding occurs elsewhere, for example, under the skin, in the mouth, or in the other eye.  · You have recurring subconjunctival hemorrhages.  SEEK IMMEDIATE MEDICAL CARE IF:   · Your vision changes or you have difficulty seeing.  · You develop a severe headache, persistent vomiting, confusion, or abnormal drowsiness (lethargy).  · Your eye seems to bulge or protrude from the eye socket.  · You notice the sudden appearance of bruises or have spontaneous bleeding elsewhere on your body.     This information is not intended to replace advice given to you by your health care provider. Make sure you discuss any questions you have with your health care provider.     Document Released: 12/18/2006 Document Revised: 01/08/2016 Document Reviewed: 11/15/2010  Elsevier Interactive Patient Education ©2016 [employer]

## 2018-02-25 NOTE — PROGRESS NOTES
"Subjective:      Cyndy Petit is a 72 y.o. female who presents with Eye Problem (L eye Redness, lump on eye, itchy, x2 days)                   Chief Complaint   Patient presents with   • Eye Problem     L eye Redness, lump on eye, itchy, x2 days       HPI:  Cyndy Petit is a 72 y.o. female who presents with left eye irriation x 2 days.   Patient states that she was wearing her CPAP machine and believes there might a flowed onto her eye. Denies pain. Slight blurred vision but not more than normal. Patient is scheduled for bilateral cataract surgery within 2 weeks of each other. Has follow-up Tuesday with ophthalmologist for right cataract surgery. Slight irritation but no discharge. Patient denies HA, SOB, chest pain, palpitations, fever, chills, or n/v/d.      Past Medical History:   Diagnosis Date   • Arthritis    • Asthma    • Carpal tunnel syndrome    • Cold 4-8-2017    cold; taking antibiotics  4-   • Depression    • DJD (degenerative joint disease)    • GERD (gastroesophageal reflux disease)    • GERD (gastroesophageal reflux disease)    • Heart burn    • Hiatus hernia syndrome    • Hypertension    • Obesity    • Obstructive sleep apnea    • Pain     back, knees   • Sleep apnea     CPAP   • Snoring    • Sorethroat 2/09/2016   • Thrush     from \"Advair\", takes nystatin   • Urinary incontinence        Past Surgical History:   Procedure Laterality Date   • KNEE ARTHROPLASTY TOTAL Right 5/1/2017    Procedure: KNEE ARTHROPLASTY TOTAL;  Surgeon: Jesús Rutledge M.D.;  Location: [facility];  Service:    • KNEE ARTHROPLASTY TOTAL Left 3/7/2016    Procedure: KNEE ARTHROPLASTY TOTAL;  Surgeon: Jesús Rutledge M.D.;  Location: [facility];  Service:    • LUMBAR FUSION POSTERIOR  9/13/2013    Performed by Kaushal Ayala M.D. at [facility]   • LUMBAR LAMINECTOMY DISKECTOMY  9/13/2013    Performed by Kaushal Ayala M.D. at [facility]   • LUMBAR " LAMINECTOMY DISKECTOMY  5/30/2013    Performed by Kaushal Ayala M.D. at SURGERY [facility]   • CHOLECYSTECTOMY  2010    laparoscopic   • OTHER NEUROLOGICAL SURG  2/2009    I&D of brain from sinus infection   • FORAMINOTOMY  11/26/08    Performed by [facility] at SURGERY [facility]   • HARDWARE REMOVAL NEURO  11/26/08    Performed by [facility] at SURGERY [facility]   • KNEE ARTHROSCOPY Right 2008   • LUMBAR FUSION POSTERIOR  2005   • CARPAL TUNNEL RELEASE Right 2000   • SHOULDER ARTHROTOMY Right 2000       Family History   Problem Relation Age of Onset   • Heart Disease Father    • Arthritis Father    • Lung Disease Father      asthma   • Psychiatry Father      depression   • Alcohol/Drug Father    • Hypertension Mother    • Cancer Mother    • Heart Disease Mother    • Arthritis Mother    • Stroke Mother    • Hyperlipidemia     • Alcohol/Drug Brother    • Genetic Neg Hx    • Diabetes Neg Hx      No pertinent family history.    Social History     Social History   • Marital status:      Spouse name: N/A   • Number of children: N/A   • Years of education: N/A     Occupational History   • Not on file.     Social History Main Topics   • Smoking status: Former Smoker     Packs/day: 1.00     Years: 6.00     Types: Cigarettes     Start date: 1/1/1972     Quit date: 1/1/1978   • Smokeless tobacco: Never Used      Comment: 1978   • Alcohol use No   • Drug use: No   • Sexual activity: Not on file     Other Topics Concern   • Not on file     Social History Narrative    ** Merged History Encounter **              Current Outpatient Prescriptions:   •  guaiFENesin LA, 600 mg, Oral, Q12HRS, 2/24/2018 at 0430  •  docusate sodium, 100 mg, Oral, BID, 2/24/2018 at 0430  •  HYDROcodone-acetaminophen, 1-2 Tab, Oral, TID, 2/24/2018 at 1200  •  DULoxetine, 60 mg, Oral, DAILY, 2/24/2018 at 0430  •  nystatin/triamcinolone, 1 Application, Topical, PRN, 2/24/2018 at Unknown  •  diphenhydrAMINE, 50 mg, Oral,  "QHS, 2/24/2018 at 1400  •  gabapentin, 300 mg, Oral, TID, 2/24/2018 at 1200  •  fluticasone-salmeterol, 1 Puff, Inhalation, Q12HRS, 2/24/2018 at 0430  •  albuterol, 2 Puff, Inhalation, Q6HRS PRN, 2/24/2018 at 1600  •  losartan, 100 mg, Oral, DAILY, 2/24/2018 at 1200  •  nystatin, 100,000 Units, Oral, 5X/DAY, 2/24/2018 at 1400  •  montelukast, 10 mg, Oral, QAM, 2/24/2018 at 0430  •  traZODone, 50 mg, Oral, Nightly, 2/24/2018 at 2200  •  fluticasone, 1 Spray, Nasal, QAM, 2/24/2018 at 0430  •  omeprazole, 20 mg, Oral, DAILY, 2/24/2018 at 0430    Allergies   Allergen Reactions   • Allergy      Unknown antibiotic   • Cefepime Hives   • Cephalosporins Hives   • Clarithromycin Hives   • Cleocin [Clindamycin] Itching   • Meropenem Hives   • Morphine Itching   • Sulfamethoxazole-Trimethoprim Itching        Review of Systems   Constitutional: Negative.    HENT: Negative.    Eyes: Positive for redness.   Respiratory: Negative.    Cardiovascular: Negative.    Gastrointestinal: Negative.    Genitourinary: Negative.    Musculoskeletal: Negative.    Skin: Negative.    Neurological: Negative.    Endo/Heme/Allergies: Negative.    Psychiatric/Behavioral: Negative.           Objective:     /90   Pulse 84   Temp 36.9 °C (98.5 °F)   Ht 1.575 m (5' 2\")   Wt 114.6 kg (252 lb 9.6 oz)   SpO2 96%   BMI 46.20 kg/m²      Physical Exam   Eyes:               Nursing notes and vitals reviewed.    Constitutional:   Appropriately groomed, pleasant affect, well nourished, in NAD.    Head:   Normocephalic, atraumatic.    Eyes:   PERRLA, EOM's full, Left eye: sclera injected, hemorrhage present with blood vessel present at 10:00, conjunctiva erythematous, and medial canthus with out exudate.  No preauricular lymphadenopathy.      No foreign body identified.  No uptake with fluorescein dye.  Eye irrigated copiously with sterile saline.  Patient tolerated well.    Ears:  Hearing grossly intact to voice.    Nose:  Nares patent bilaterally.  " Nasal mucosa not edematous.      Throat:  Dentition wnl, mucosa moist without lesions.  Oropharynx not erythematous, with no enlargement of the palatine tonsils bilaterally with no exudates.    No post nasal drainage present.  Soft palate rises symmetrically bilaterally and uvula midline.      Neck: Neck supple, with mild anterior lymphadenopathy that is soft and mobile to palpation. Thyroid non-palpable without tenderness or nodules. No supraclavicular lymphadenopathy.    Lungs:  Respiratory effort not labored without accessory muscle use.     Musculoskeletal:  Gait non-antalgic with a narrow base.    Derm:  Skin without rashes or lesions with good turgor pressure.      Psychiatric:  Mood, affect, and judgement appropriate.         Assessment/Plan:     1. Subconjunctival hemorrhage of left eye  ofloxacin (OCUFLOX) 0.3 % Solution      Patient presents with left subconjunctival hemorrhage ×2 days. No blood thinners. On exam patient has no uptake with fluorescein dye. Patient tolerated well. No involvement of the cornea. Bulging blood vessel present medial aspect at about 10:00. Does appear to be below conjunctiva. No current bleeding or free blood. No blood in the anterior chamber. No pain. Did advise patient to present to the ER if symptoms change or worsen or she develops any change in vision to concern for glaucoma. Did discuss with this patient. Patient to follow up on Tuesday with ophthalmologist. Plan to treat this time with ofloxacin topical.    Patient was in agreement with this treatment plan and seemed to understand without barriers. All questions were encouraged and answered.  Reviewed signs and symptoms of when to seek emergency medical care.     Please note that this dictation was created using voice recognition software.  I have made every reasonable attempt to correct obvious errors, but I expect there are errors of ha and possibly content that I did not discover before finalizing the note.

## 2018-03-16 ENCOUNTER — HOSPITAL ENCOUNTER (OUTPATIENT)
Dept: RADIOLOGY | Facility: MEDICAL CENTER | Age: 73
End: 2018-03-16
Attending: FAMILY MEDICINE
Payer: MEDICARE

## 2018-03-16 DIAGNOSIS — Z12.31 SCREENING MAMMOGRAM, ENCOUNTER FOR: ICD-10-CM

## 2018-03-16 PROCEDURE — 77067 SCR MAMMO BI INCL CAD: CPT

## 2018-04-30 ENCOUNTER — DOCUMENTATION (OUTPATIENT)
Dept: BEHAVIORAL HEALTH | Facility: PHYSICIAN GROUP | Age: 73
End: 2018-04-30

## 2018-04-30 NOTE — PROGRESS NOTES
Patient scheduled as a new patient today. I briefly met with patient who has been referred by pain management for clearance prior to an upcoming procedure. I explained to the patient that surgical clearance is done by Dr. Bharti Alonzo, Ph.D in our clinic and her appointment was rescheduled with Dr. Alonzo for tomorrow.

## 2018-05-01 ENCOUNTER — OFFICE VISIT (OUTPATIENT)
Dept: BEHAVIORAL HEALTH | Facility: PHYSICIAN GROUP | Age: 73
End: 2018-05-01
Payer: MEDICARE

## 2018-05-01 DIAGNOSIS — G89.29 OTHER CHRONIC PAIN: ICD-10-CM

## 2018-05-01 DIAGNOSIS — Z00.8 EVALUATION BY PSYCHIATRIC SERVICE REQUIRED: ICD-10-CM

## 2018-05-01 PROCEDURE — 96101 PB PSYCHOLOGIC TESTING BY PSYCH/PHYS: CPT | Performed by: PSYCHOLOGIST

## 2018-05-01 PROCEDURE — 90791 PSYCH DIAGNOSTIC EVALUATION: CPT | Performed by: PSYCHOLOGIST
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
## 2018-05-18 ENCOUNTER — OFFICE VISIT (OUTPATIENT)
Dept: URGENT CARE | Facility: PHYSICIAN GROUP | Age: 73
End: 2018-05-18
Payer: MEDICARE

## 2018-05-18 VITALS
HEART RATE: 84 BPM | SYSTOLIC BLOOD PRESSURE: 132 MMHG | DIASTOLIC BLOOD PRESSURE: 80 MMHG | OXYGEN SATURATION: 94 % | TEMPERATURE: 98.9 F | BODY MASS INDEX: 47.11 KG/M2 | HEIGHT: 62 IN | WEIGHT: 256 LBS

## 2018-05-18 DIAGNOSIS — Z91.09 ENVIRONMENTAL ALLERGIES: ICD-10-CM

## 2018-05-18 DIAGNOSIS — R05.9 COUGH: ICD-10-CM

## 2018-05-18 DIAGNOSIS — J45.909 UNCOMPLICATED ASTHMA, UNSPECIFIED ASTHMA SEVERITY, UNSPECIFIED WHETHER PERSISTENT: ICD-10-CM

## 2018-05-18 PROCEDURE — 99213 OFFICE O/P EST LOW 20 MIN: CPT | Performed by: NURSE PRACTITIONER

## 2018-05-18 NOTE — PROGRESS NOTES
"Subjective:      Cyndy Petit is a 72 y.o. female who presents with Pharyngitis (cough, yellow mucus ongoing several days)      PCP: Sultana Browne M.D.         [facility] reviewed and updated as necessary in EPIC electronic record with patient today.  Medications including OTC medications reviewed with patient.         Allergies   Allergen Reactions   • Allergy      Unknown antibiotic   • Cefepime Hives   • Cephalosporins Hives   • Clarithromycin Hives   • Cleocin [Clindamycin] Itching   • Meropenem Hives   • Morphine Itching   • Sulfamethoxazole-Trimethoprim Itching         HPI this is a new problem. She has been working in her yard and pulling weeds. She believes she is allergic to some of the plants that are growing. Increased cough \" I think it's going into my lungs and I want to kick it in the butt\".  Report morning productive cough of small amounts of clear to yellow sputum. Improves after 'coughing it all out\". Reports that she always takes augmentin for her illness given to her by her PCP. Denies fever, chills, SOB, body aches, rhinitis, eye drainage, wheezing, sinus pain/ pressure or orthopnea. Wears CPAP at night with humidification. Taking OTC mucinex with good relief of her symptoms.  No other aggravating or alleviating factors.       ROS  See HPI      Objective:     /80   Pulse 84   Temp 37.2 °C (98.9 °F)   Ht 1.575 m (5' 2\")   Wt 116.1 kg (256 lb)   SpO2 94%   BMI 46.82 kg/m²      Physical Exam   Constitutional: She is oriented to person, place, and time. Vital signs are normal. She appears well-developed and well-nourished.  Non-toxic appearance. She does not have a sickly appearance. She does not appear ill. No distress.   HENT:   Head: Normocephalic.   Right Ear: Hearing, external ear and ear canal normal. Tympanic membrane is injected. Tympanic membrane is not erythematous. No middle ear effusion.   Left Ear: Hearing, external ear and ear canal normal. Tympanic membrane is " injected. Tympanic membrane is not erythematous.  No middle ear effusion.   Nose: Rhinorrhea present. No mucosal edema. Right sinus exhibits no maxillary sinus tenderness and no frontal sinus tenderness. Left sinus exhibits no maxillary sinus tenderness and no frontal sinus tenderness.   Mouth/Throat: Uvula is midline. Posterior oropharyngeal erythema present. No oropharyngeal exudate, posterior oropharyngeal edema or tonsillar abscesses.   Eyes: Pupils are equal, round, and reactive to light.   Neck: Trachea normal, normal range of motion and full passive range of motion without pain. Neck supple.   Cardiovascular: Normal rate and regular rhythm.  PMI is not displaced.    Pulmonary/Chest: Effort normal and breath sounds normal. No accessory muscle usage. No tachypnea. No respiratory distress. She has no decreased breath sounds. She has no wheezes. She has no rhonchi. She has no rales.   Speaking in full sentences. No distress.    Abdominal: Soft. Normal appearance. There is no tenderness.   Musculoskeletal: Normal range of motion.   Lymphadenopathy:     She has no cervical adenopathy.        Right: No supraclavicular adenopathy present.        Left: No supraclavicular adenopathy present.   Neurological: She is alert and oriented to person, place, and time. She has normal strength. Gait normal.   Skin: Skin is warm and dry.   Psychiatric: She has a normal mood and affect. Her speech is normal and behavior is normal.   Nursing note and vitals reviewed.              Assessment/Plan:       1. Cough     2. Environmental allergies     3. Uncomplicated asthma, unspecified asthma severity, unspecified whether persistent       Discussed that I felt this was viral in nature. Did not see any evidence of a bacterial process. Discussed natural progression and sx care.  Fu with PCP 7-10 days or if new or worsening symptoms.   Keep well hydrated.   OTC antihistamine of choice. Follow manufactures dosing and safety guidelines.    Resume all prior medications. Take as prescribed.   Consider wearing a mask when working in her yard. Avoid irritants.

## 2018-06-01 ENCOUNTER — HOSPITAL ENCOUNTER (OUTPATIENT)
Dept: RADIOLOGY | Facility: MEDICAL CENTER | Age: 73
End: 2018-06-01
Attending: FAMILY MEDICINE
Payer: MEDICARE

## 2018-06-01 DIAGNOSIS — M25.551 RIGHT HIP PAIN: ICD-10-CM

## 2018-06-01 PROCEDURE — 73502 X-RAY EXAM HIP UNI 2-3 VIEWS: CPT | Mod: RT

## 2018-08-27 ENCOUNTER — OFFICE VISIT (OUTPATIENT)
Dept: BEHAVIORAL HEALTH | Facility: PHYSICIAN GROUP | Age: 73
End: 2018-08-27
Payer: MEDICARE

## 2018-08-27 VITALS
DIASTOLIC BLOOD PRESSURE: 96 MMHG | HEART RATE: 79 BPM | WEIGHT: 256 LBS | SYSTOLIC BLOOD PRESSURE: 164 MMHG | BODY MASS INDEX: 47.11 KG/M2 | HEIGHT: 62 IN

## 2018-08-27 DIAGNOSIS — F51.04 CHRONIC INSOMNIA: ICD-10-CM

## 2018-08-27 DIAGNOSIS — F33.42 MDD (MAJOR DEPRESSIVE DISORDER), RECURRENT, IN FULL REMISSION (HCC): ICD-10-CM

## 2018-08-27 PROCEDURE — 99204 OFFICE O/P NEW MOD 45 MIN: CPT | Performed by: PSYCHIATRY & NEUROLOGY

## 2018-08-27 RX ORDER — FLUTICASONE PROPIONATE 50 MCG
1 SPRAY, SUSPENSION (ML) NASAL DAILY
COMMUNITY

## 2018-08-27 ASSESSMENT — PATIENT HEALTH QUESTIONNAIRE - PHQ9: CLINICAL INTERPRETATION OF PHQ2 SCORE: 0

## 2018-08-27 NOTE — PROGRESS NOTES
INITIAL PSYCHIATRY EVALUATION      Chief Complaint   Patient presents with   • Evaluation Of Medication Change         History Of Present Illness:  Cyndy Petit is a 72 y.o. old female with history of hypertension, low back pain, GERD, sleep apnea on CPAP, asthma, depressive disorder referred by Sultana Browne M.D for evaluation of depression.  She has struggled with depression on and off since 1985 which is when her son went to [facility] on some drug related charges.  She describes her depression at that time as feeling sad, crying spells, poor sleep, increased appetite, anhedonia, difficulty making decisions, suicidal thoughts.  She was started on Prozac which she took for several years and it helped her depression.  She tried a few other antidepressants with Prozac stopped being effective but they were ineffective.  She was started on Cymbalta over 5 years ago and she has noticed an improvement in her depression.  She also has low back pain and feels that Cymbalta helps with her low back pain as well.  She sees Dr. Altaf Hapr for her chronic low back pain and was recently prescribed Zoloft.  She is not sure why she was prescribed another antidepressant and she found that out when she went to  her medications at the pharmacy.  She never started it and wants to see if she needs to switch her antidepressants are not.  She has taken Zoloft in the past for a couple of years and it was ineffective.  She feels that Cymbalta helps her depression really good and she has not noticed any side effects and would like to continue on Cymbalta.  She does not have prolonged symptoms of depression anymore.  She does get sad related to psychosocial stressors especially her  and her kids.  She has really good support from her kids but struggles with low self-esteem and gets upset when they are mad at her.  She takes trazodone and Benadryl for sleep with good benefit.  Denies any current problems with her  "appetite.  Denies anhedonia, enjoys spending time with her family.  Denies any current psychosocial stressors.  She denies symptoms consistent with hypomania, cecilia or psychosis.  She states that she has struggled with some situational anxiety in the past but denies any current symptoms of anxiety.  She denies problems with anger or irritability.  She denies having active or passive thoughts of wanting to hurt herself or others    Current psychiatric medications - Cymbalta 60 mg (for 5+ years)    Past Psychiatric History:  Denies history of suicide attempt or prior inpatient psychiatry hospitalization.  Previous medication trials - Prozac (effective), Zoloft (ineffective), Xanax, Ativan    Past Medical/Surgical History:  Past Medical History:   Diagnosis Date   • Arthritis    • Asthma    • Carpal tunnel syndrome    • Cold 4-8-2017    cold; taking antibiotics  4-   • Depression    • DJD (degenerative joint disease)    • GERD (gastroesophageal reflux disease)    • GERD (gastroesophageal reflux disease)    • Heart burn    • Hiatus hernia syndrome    • Hypertension    • Obesity    • Obstructive sleep apnea    • Pain     back, knees   • Sleep apnea     CPAP   • Snoring    • Sorethroat 2/09/2016   • Thrush     from \"Advair\", takes nystatin   • Urinary incontinence      Past Surgical History:   Procedure Laterality Date   • KNEE ARTHROPLASTY TOTAL Right 5/1/2017    Procedure: KNEE ARTHROPLASTY TOTAL;  Surgeon: Jesús Rutledge M.D.;  Location: [facility];  Service:    • KNEE ARTHROPLASTY TOTAL Left 3/7/2016    Procedure: KNEE ARTHROPLASTY TOTAL;  Surgeon: Jesús Rutledge M.D.;  Location: [facility];  Service:    • LUMBAR FUSION POSTERIOR  9/13/2013    Performed by Kaushal Ayala M.D. at SURGERY [facility]   • LUMBAR LAMINECTOMY DISKECTOMY  9/13/2013    Performed by Kaushal Ayala M.D. at [facility]   • LUMBAR LAMINECTOMY DISKECTOMY  5/30/2013    Performed by Kaushal RHODES" LUCRECIA Ayala at SURGERY [facility]   • CHOLECYSTECTOMY      laparoscopic   • OTHER NEUROLOGICAL SURG  2009    I&D of brain from sinus infection   • FORAMINOTOMY  08    Performed by [facility] STALLWORTH at SURGERY [facility]   • HARDWARE REMOVAL NEURO  08    Performed by [facility] STALLWORTH at SURGERY [facility]   • KNEE ARTHROSCOPY Right    • LUMBAR FUSION POSTERIOR     • CARPAL TUNNEL RELEASE Right    • SHOULDER ARTHROTOMY Right    • EYE SURGERY Bilateral     Cataract surgery 2018, 3/2018       Family Psychiatric History:  Son - illicit drug addiction, in remission   Father () - depression  Daughter - depression  Grand daughter - bipolar mood disorder    Substance Use/Addiction History:  Alcohol - Denies  Nicotine - Denies  Illicit drugs - Denies    Social History:  She has been  ×4 and  ×3.  She has been  to her fourth  for 41 years and lives with him in [place].  She has 3 biological kids -2 daughters and a son and one step daughter.  She is close with all of her kids.    Allergies:  Allergy; Cefepime; Cephalosporins; Clarithromycin; Cleocin [clindamycin]; Meropenem; Morphine; and Sulfamethoxazole-trimethoprim    Medications:  Current Outpatient Prescriptions   Medication Sig Dispense Refill   • fluticasone-salmeterol (ADVAIR DISKUS) 500-50 MCG/DOSE AEROSOL POWDER, BREATH ACTIVATED Inhale 1 Puff by mouth every 12 hours.     • fluticasone (FLONASE) 50 MCG/ACT nasal spray Spray 1 Spray in nose every day.     • amLODIPine (NORVASC) 5 MG Tab Take 5 mg by mouth every day.     • multivitamin (THERAGRAN) Tab Take 1 Tab by mouth every day.     • Multiple Vitamins-Minerals (HAIR SKIN AND NAILS FORMULA) Tab Take  by mouth.     • hydrocodone-acetaminophen (NORCO) 5-325 MG Tab per tablet Take 1-2 Tabs by mouth every 6 hours as needed.     • duloxetine (CYMBALTA) 60 MG Cap DR Particles delayed-release capsule Take 60 mg by mouth every day.     •  "diphenhydrAMINE (BENADRYL) 50 MG Cap Take 50 mg by mouth every bedtime.     • gabapentin (NEURONTIN) 300 MG CAPS Take 1 Cap by mouth 3 times a day. 90 Cap 0   • losartan (COZAAR) 100 MG TABS Take 100 mg by mouth every day.     • nystatin (MYCOSTATIN) 401532 UNIT/ML SUSP Take 100,000 Units by mouth 5 Times a Day.     • montelukast (SINGULAIR) 10 MG TABS Take 10 mg by mouth every morning.     • trazodone (DESYREL) 50 MG TABS Take 50 mg by mouth every evening.     • omeprazole (PRILOSEC) 20 MG CPDR Take 20 mg by mouth 2 times a day.     • docusate sodium (COLACE) 100 MG Cap Take 100 mg by mouth 2 times a day.     • nystatin/triamcinolone (MYCOLOG) 793269-6.1 UNIT/GM-% Cream Apply 1 Application to affected area(s) as needed (For vaginal).     • albuterol (PROAIR HFA) 108 (90 BASE) MCG/ACT AERS Inhale 2 Puffs by mouth every 6 hours as needed for Shortness of Breath. Shortness of breath       No current facility-administered medications for this visit.        Review of Symptoms:  Constitutional - Positive for fatigue  Eyes - Negative for blurry vision  HEENT - Negative for sore throat  Respiratory - Negative for shortness of breath, cough  CVS - Negative for chest pain, palpitations  GI - Negative for nausea, vomiting, abdominal pain, diarrhea, constipation  Skin - Negative for rash  Musculoskeletal - Positive for back and knee pain  Neurological - Negative for headaches  Psychiatric - Positive for infrequent depression    Physical Examination:  Vital signs: BP (!) 164/96   Pulse 79   Ht 1.575 m (5' 2\")   Wt 116.1 kg (256 lb)   BMI 46.82 kg/m²     Musculoskeletal: Slow and stable gait, ambulating with assistance of a cane. No abnormal movements.     Mental Status Evaluation:   General: Elderly white female, dressed in casual attire, good grooming and hygiene, in no apparent distress, calm and cooperative, good eye contact, no psychomotor agitation or retardation  Orientation: Alert and oriented to person, place and " "time  Recent and remote memory: Intact  Attention span and concentration: Intact  Speech: Spontaneous, normal rate, rhythm and tone  Thought Process: Linear, logical and goal directed  Thought Content: Denies suicidal or homicidal ideations, intent or plan  Perception: Denies auditory or visual hallucinations. No delusions noted  Associations: Intact  Language: Appropriate  Fund of knowledge and vocabulary: Adequate  Mood: \"good\"  Affect: Euthymic, mood congruent  Insight: Good  Judgment: Good    Depression screening:  Depression Screen (PHQ-2/PHQ-9) 11/17/2017 8/27/2018   PHQ-2 Total Score 0 0     Interpretation of PHQ-9 Total Score   Score Severity   1-4 No Depression   5-9 Mild Depression   10-14 Moderate Depression   15-19 Moderately Severe Depression   20-27 Severe Depression    Medical Records/Labs/Diagnostic Tests Reviewed:  [place]  records -appropriate refills, no abuse suspected    Impression:  1.  Major depressive disorder, recurrent, in full remission  2.  Chronic insomnia    Plan:  1.  Continue Cymbalta 60 mg daily for depression.  I have advised her to continue Cymbalta as it seems to be helping her depression.  There is no reason for her to switch over to another antidepressant (Zoloft) which she has previously taken with no efficacy.  2.  Continue Trazodone 50 mg and Benadryl 50 mg at bedtime for sleep    Return to clinic in on as needed basis if symptoms worsen    The proposed treatment plan was discussed with the patient who was provided the opportunity to ask questions and make suggestions regarding alternative treatment. Patient verbalized understanding and expressed agreement with the plan.     Thank you for allowing me to participate in the care of this patient.    Shellie Akers M.D.  08/27/18    CC:   Sultana Browne M.D.    This note was created using voice recognition software (Dragon). The accuracy of the dictation is limited by the abilities of the software. I have reviewed the note " prior to signing, however some errors in grammar and context are still possible. If you have any questions related to this note please do not hesitate to contact our office.

## 2018-11-02 ENCOUNTER — SLEEP CENTER VISIT (OUTPATIENT)
Dept: SLEEP MEDICINE | Facility: MEDICAL CENTER | Age: 73
End: 2018-11-02
Payer: MEDICARE

## 2018-11-02 VITALS
TEMPERATURE: 97.7 F | HEIGHT: 62 IN | HEART RATE: 83 BPM | BODY MASS INDEX: 46.74 KG/M2 | DIASTOLIC BLOOD PRESSURE: 74 MMHG | WEIGHT: 254 LBS | RESPIRATION RATE: 16 BRPM | OXYGEN SATURATION: 93 % | SYSTOLIC BLOOD PRESSURE: 120 MMHG

## 2018-11-02 DIAGNOSIS — J30.2 SEASONAL ALLERGIES: ICD-10-CM

## 2018-11-02 DIAGNOSIS — Z87.891 FORMER SMOKER: ICD-10-CM

## 2018-11-02 DIAGNOSIS — G47.33 OSA (OBSTRUCTIVE SLEEP APNEA): ICD-10-CM

## 2018-11-02 DIAGNOSIS — J45.40 MODERATE PERSISTENT ASTHMA WITHOUT COMPLICATION: ICD-10-CM

## 2018-11-02 PROCEDURE — 99214 OFFICE O/P EST MOD 30 MIN: CPT | Performed by: NURSE PRACTITIONER

## 2018-11-02 ASSESSMENT — ENCOUNTER SYMPTOMS
EYE PAIN: 0
BRUISES/BLEEDS EASILY: 0
EYE DISCHARGE: 0
GASTROINTESTINAL NEGATIVE: 1
NEUROLOGICAL NEGATIVE: 1
PSYCHIATRIC NEGATIVE: 1
MUSCULOSKELETAL NEGATIVE: 1
RESPIRATORY NEGATIVE: 1
CONSTITUTIONAL NEGATIVE: 1
CARDIOVASCULAR NEGATIVE: 1

## 2018-11-02 NOTE — PROGRESS NOTES
"Chief Complaint   Patient presents with   • Apnea     last seen 8/21/18         HPI: This patient is a 72 y.o. female, who presents for annual follow-up of obstructive sleep apnea.     Polysomnogram indicates AHI 12.4 and minimum saturation 84%. She was titrated to CPAP 16 cm. Her pressure was decreased to 14 cm H2O for tolerance.  Compliance download over the past 30 days indicates 100 % compliance, average use of 6 hours 8 minutes per night, AHI of 1.9. Patient reports tingling to benefit from therapy.  Denies EDS or a.m. headache.     Patient has a history of asthma, PFTs from 2015 are normal. The patient is compliant with Advair 500/50 µg twice daily, and albuterol HFA as needed.  Breathing remains stable.  She had 2 episodes of bronchitis earlier in the year.  Denies pulmonary complaints currently.    She has significant seasonal allergies, takes Singulair and Flonase with improvement in symptoms.  She does follow with ENT.     BMI is 44.  She had both knees replaced in the past 2 years.  She does try to walk frequently but has been unable to lose weight.    Past Medical History:   Diagnosis Date   • Arthritis    • Asthma    • Carpal tunnel syndrome    • Cold 4-8-2017    cold; taking antibiotics  4-   • Depression    • DJD (degenerative joint disease)    • GERD (gastroesophageal reflux disease)    • GERD (gastroesophageal reflux disease)    • Heart burn    • Hiatus hernia syndrome    • Hypertension    • Obesity    • Obstructive sleep apnea    • Pain     back, knees   • Sleep apnea     CPAP   • Snoring    • Sorethroat 2/09/2016   • Thrush     from \"Advair\", takes nystatin   • Urinary incontinence        Social History   Substance Use Topics   • Smoking status: Former Smoker     Packs/day: 1.00     Years: 6.00     Types: Cigarettes     Start date: 1/1/1972     Quit date: 1/1/1978   • Smokeless tobacco: Never Used      Comment: 1978   • Alcohol use No       Family History   Problem Relation Age of Onset   • " Heart Disease Father    • Arthritis Father    • Lung Disease Father         asthma   • Psychiatry Father         depression   • Alcohol/Drug Father    • Depression Father    • Hypertension Mother    • Cancer Mother    • Heart Disease Mother    • Arthritis Mother    • Stroke Mother    • Hyperlipidemia Unknown    • Alcohol/Drug Brother    • Depression Daughter    • Drug abuse Son         in remission   • Bipolar disorder Grandchild    • Genetic Neg Hx    • Diabetes Neg Hx        Immunization History   Administered Date(s) Administered   • Influenza TIV (IM) 09/11/2013, 10/01/2015, 10/01/2016   • Pneumococcal Conjugate Vaccine (Prevnar/PCV-13) 10/01/2015   • Pneumococcal polysaccharide vaccine (PPSV-23) 01/01/2009       Current medications as of today   Current Outpatient Prescriptions   Medication Sig Dispense Refill   • fluticasone-salmeterol (ADVAIR DISKUS) 500-50 MCG/DOSE AEROSOL POWDER, BREATH ACTIVATED Inhale 1 Puff by mouth every 12 hours.     • fluticasone (FLONASE) 50 MCG/ACT nasal spray Spray 1 Spray in nose every day.     • amLODIPine (NORVASC) 5 MG Tab Take 5 mg by mouth every day.     • multivitamin (THERAGRAN) Tab Take 1 Tab by mouth every day.     • hydrocodone-acetaminophen (NORCO) 5-325 MG Tab per tablet Take 1-2 Tabs by mouth every 6 hours as needed.     • duloxetine (CYMBALTA) 60 MG Cap DR Particles delayed-release capsule Take 60 mg by mouth every day.     • diphenhydrAMINE (BENADRYL) 50 MG Cap Take 50 mg by mouth every bedtime.     • gabapentin (NEURONTIN) 300 MG CAPS Take 1 Cap by mouth 3 times a day. 90 Cap 0   • losartan (COZAAR) 100 MG TABS Take 100 mg by mouth every day.     • nystatin (MYCOSTATIN) 406439 UNIT/ML SUSP Take 100,000 Units by mouth 5 Times a Day.     • montelukast (SINGULAIR) 10 MG TABS Take 10 mg by mouth every morning.     • trazodone (DESYREL) 50 MG TABS Take 50 mg by mouth every evening.     • omeprazole (PRILOSEC) 20 MG CPDR Take 20 mg by mouth 2 times a day.     • Multiple  "Vitamins-Minerals (HAIR SKIN AND NAILS FORMULA) Tab Take  by mouth.     • docusate sodium (COLACE) 100 MG Cap Take 100 mg by mouth 2 times a day.     • nystatin/triamcinolone (MYCOLOG) 501905-2.1 UNIT/GM-% Cream Apply 1 Application to affected area(s) as needed (For vaginal).     • albuterol (PROAIR HFA) 108 (90 BASE) MCG/ACT AERS Inhale 2 Puffs by mouth every 6 hours as needed for Shortness of Breath. Shortness of breath       No current facility-administered medications for this visit.        Allergies: Allergy; Cefepime; Cephalosporins; Clarithromycin; Cleocin [clindamycin]; Meropenem; Morphine; and Sulfamethoxazole-trimethoprim    Blood pressure 120/74, pulse 83, temperature 36.5 °C (97.7 °F), temperature source Temporal, resp. rate 16, height 1.575 m (5' 2\"), weight 115.2 kg (254 lb), SpO2 93 %, not currently breastfeeding.      Review of Systems   Constitutional: Negative.    HENT: Negative.    Eyes: Negative for pain and discharge.   Respiratory: Negative.    Cardiovascular: Negative.    Gastrointestinal: Negative.    Musculoskeletal: Negative.    Skin: Negative.    Neurological: Negative.    Endo/Heme/Allergies: Negative for environmental allergies. Does not bruise/bleed easily.   Psychiatric/Behavioral: Negative.        Physical Exam   Constitutional: She is oriented to person, place, and time and well-developed, well-nourished, and in no distress.   HENT:   Head: Normocephalic and atraumatic.   Eyes: Pupils are equal, round, and reactive to light.   Neck: Normal range of motion. Neck supple. No tracheal deviation present.   Cardiovascular: Normal rate, regular rhythm and normal heart sounds.    Pulmonary/Chest: Effort normal and breath sounds normal.   Musculoskeletal: Normal range of motion.   Neurological: She is alert and oriented to person, place, and time.   Unsteady gait, ambulates with cane   Skin: Skin is warm and dry.   Psychiatric: Mood, memory, affect and judgment normal.   Vitals " reviewed.      Diagnoses/Plan:    1. BEATRIZ (obstructive sleep apnea)   Continue CPAP nightly, Clean mask & tubing weekly, Replace supplies as insurance will allow, RX for new supplies to DME  - DME Mask and Supplies    2. Seasonal allergies  Stable, continue daily Singulair and Flonase    3. Moderate persistent asthma without complication  Stable, continue current bronchodilator    4. Former smoker-distant, quit 1978  Distant smoking history quit 1978    5. BMI 40.0-44.9, adult ([facility])  Patient's body mass index is 46.46 kg/m². Exercise and nutrition counseling were performed at this visit.    Follow-up annually    This dictation was created using voice recognition software. The accuracy of the dictation is limited to the abilities of the software. I expect there may be some errors of grammar and possibly content.

## 2018-11-13 ENCOUNTER — HOSPITAL ENCOUNTER (OUTPATIENT)
Dept: LAB | Facility: MEDICAL CENTER | Age: 73
End: 2018-11-13
Attending: FAMILY MEDICINE
Payer: MEDICARE

## 2018-11-13 LAB
ALBUMIN SERPL BCP-MCNC: 3.7 G/DL (ref 3.2–4.9)
ALBUMIN/GLOB SERPL: 1.4 G/DL
ALP SERPL-CCNC: 85 U/L (ref 30–99)
ALT SERPL-CCNC: 15 U/L (ref 2–50)
ANION GAP SERPL CALC-SCNC: 4 MMOL/L (ref 0–11.9)
AST SERPL-CCNC: 17 U/L (ref 12–45)
BASOPHILS # BLD AUTO: 1.2 % (ref 0–1.8)
BASOPHILS # BLD: 0.08 K/UL (ref 0–0.12)
BILIRUB SERPL-MCNC: 0.7 MG/DL (ref 0.1–1.5)
BUN SERPL-MCNC: 17 MG/DL (ref 8–22)
CALCIUM SERPL-MCNC: 9 MG/DL (ref 8.5–10.5)
CHLORIDE SERPL-SCNC: 106 MMOL/L (ref 96–112)
CHOLEST SERPL-MCNC: 160 MG/DL (ref 100–199)
CO2 SERPL-SCNC: 29 MMOL/L (ref 20–33)
CREAT SERPL-MCNC: 0.85 MG/DL (ref 0.5–1.4)
EOSINOPHIL # BLD AUTO: 0.49 K/UL (ref 0–0.51)
EOSINOPHIL NFR BLD: 7.1 % (ref 0–6.9)
ERYTHROCYTE [DISTWIDTH] IN BLOOD BY AUTOMATED COUNT: 49.7 FL (ref 35.9–50)
GLOBULIN SER CALC-MCNC: 2.7 G/DL (ref 1.9–3.5)
GLUCOSE SERPL-MCNC: 98 MG/DL (ref 65–99)
HCT VFR BLD AUTO: 41 % (ref 37–47)
HDLC SERPL-MCNC: 49 MG/DL
HGB BLD-MCNC: 13 G/DL (ref 12–16)
IMM GRANULOCYTES # BLD AUTO: 0.03 K/UL (ref 0–0.11)
IMM GRANULOCYTES NFR BLD AUTO: 0.4 % (ref 0–0.9)
LDLC SERPL CALC-MCNC: 91 MG/DL
LYMPHOCYTES # BLD AUTO: 1.5 K/UL (ref 1–4.8)
LYMPHOCYTES NFR BLD: 21.8 % (ref 22–41)
MCH RBC QN AUTO: 30.4 PG (ref 27–33)
MCHC RBC AUTO-ENTMCNC: 31.7 G/DL (ref 33.6–35)
MCV RBC AUTO: 96 FL (ref 81.4–97.8)
MONOCYTES # BLD AUTO: 0.59 K/UL (ref 0–0.85)
MONOCYTES NFR BLD AUTO: 8.6 % (ref 0–13.4)
NEUTROPHILS # BLD AUTO: 4.18 K/UL (ref 2–7.15)
NEUTROPHILS NFR BLD: 60.9 % (ref 44–72)
NRBC # BLD AUTO: 0 K/UL
NRBC BLD-RTO: 0 /100 WBC
PLATELET # BLD AUTO: 250 K/UL (ref 164–446)
PMV BLD AUTO: 10 FL (ref 9–12.9)
POTASSIUM SERPL-SCNC: 4.2 MMOL/L (ref 3.6–5.5)
PROT SERPL-MCNC: 6.4 G/DL (ref 6–8.2)
RBC # BLD AUTO: 4.27 M/UL (ref 4.2–5.4)
SODIUM SERPL-SCNC: 139 MMOL/L (ref 135–145)
TRIGL SERPL-MCNC: 99 MG/DL (ref 0–149)
TSH SERPL DL<=0.005 MIU/L-ACNC: 1.18 UIU/ML (ref 0.38–5.33)
WBC # BLD AUTO: 6.9 K/UL (ref 4.8–10.8)

## 2018-11-13 PROCEDURE — 80061 LIPID PANEL: CPT

## 2018-11-13 PROCEDURE — 84443 ASSAY THYROID STIM HORMONE: CPT

## 2018-11-13 PROCEDURE — 36415 COLL VENOUS BLD VENIPUNCTURE: CPT

## 2018-11-13 PROCEDURE — 85025 COMPLETE CBC W/AUTO DIFF WBC: CPT

## 2018-11-13 PROCEDURE — 80053 COMPREHEN METABOLIC PANEL: CPT

## 2018-11-25 ENCOUNTER — HOSPITAL ENCOUNTER (OUTPATIENT)
Facility: MEDICAL CENTER | Age: 73
End: 2018-11-26
Attending: EMERGENCY MEDICINE | Admitting: HOSPITALIST
Payer: MEDICARE

## 2018-11-25 ENCOUNTER — OFFICE VISIT (OUTPATIENT)
Dept: URGENT CARE | Facility: PHYSICIAN GROUP | Age: 73
End: 2018-11-25
Payer: MEDICARE

## 2018-11-25 ENCOUNTER — APPOINTMENT (OUTPATIENT)
Dept: RADIOLOGY | Facility: MEDICAL CENTER | Age: 73
End: 2018-11-25
Attending: EMERGENCY MEDICINE
Payer: MEDICARE

## 2018-11-25 VITALS
BODY MASS INDEX: 46.38 KG/M2 | WEIGHT: 252 LBS | TEMPERATURE: 98.8 F | RESPIRATION RATE: 16 BRPM | OXYGEN SATURATION: 96 % | DIASTOLIC BLOOD PRESSURE: 100 MMHG | HEART RATE: 86 BPM | SYSTOLIC BLOOD PRESSURE: 150 MMHG | HEIGHT: 62 IN

## 2018-11-25 DIAGNOSIS — I16.0 HYPERTENSIVE URGENCY: ICD-10-CM

## 2018-11-25 DIAGNOSIS — R06.02 SOB (SHORTNESS OF BREATH): ICD-10-CM

## 2018-11-25 DIAGNOSIS — I10 ESSENTIAL HYPERTENSION: ICD-10-CM

## 2018-11-25 DIAGNOSIS — R07.9 CHEST PAIN, UNSPECIFIED TYPE: ICD-10-CM

## 2018-11-25 PROBLEM — J01.10 SUBACUTE FRONTAL SINUSITIS: Status: ACTIVE | Noted: 2018-11-25

## 2018-11-25 LAB
ALBUMIN SERPL BCP-MCNC: 3.7 G/DL (ref 3.2–4.9)
ALBUMIN/GLOB SERPL: 1 G/DL
ALP SERPL-CCNC: 106 U/L (ref 30–99)
ALT SERPL-CCNC: 18 U/L (ref 2–50)
ANION GAP SERPL CALC-SCNC: 6 MMOL/L (ref 0–11.9)
AST SERPL-CCNC: 28 U/L (ref 12–45)
BASOPHILS # BLD AUTO: 0.6 % (ref 0–1.8)
BASOPHILS # BLD: 0.05 K/UL (ref 0–0.12)
BILIRUB SERPL-MCNC: 0.5 MG/DL (ref 0.1–1.5)
BNP SERPL-MCNC: 23 PG/ML (ref 0–100)
BUN SERPL-MCNC: 15 MG/DL (ref 8–22)
CALCIUM SERPL-MCNC: 8.4 MG/DL (ref 8.4–10.2)
CHLORIDE SERPL-SCNC: 103 MMOL/L (ref 96–112)
CO2 SERPL-SCNC: 26 MMOL/L (ref 20–33)
CREAT SERPL-MCNC: 0.88 MG/DL (ref 0.5–1.4)
EKG IMPRESSION: NORMAL
EOSINOPHIL # BLD AUTO: 0.61 K/UL (ref 0–0.51)
EOSINOPHIL NFR BLD: 7.2 % (ref 0–6.9)
ERYTHROCYTE [DISTWIDTH] IN BLOOD BY AUTOMATED COUNT: 45.8 FL (ref 35.9–50)
GLOBULIN SER CALC-MCNC: 3.6 G/DL (ref 1.9–3.5)
GLUCOSE SERPL-MCNC: 100 MG/DL (ref 65–99)
HCT VFR BLD AUTO: 38.7 % (ref 37–47)
HGB BLD-MCNC: 13 G/DL (ref 12–16)
IMM GRANULOCYTES # BLD AUTO: 0.03 K/UL (ref 0–0.11)
IMM GRANULOCYTES NFR BLD AUTO: 0.4 % (ref 0–0.9)
LYMPHOCYTES # BLD AUTO: 2.5 K/UL (ref 1–4.8)
LYMPHOCYTES NFR BLD: 29.7 % (ref 22–41)
MAGNESIUM SERPL-MCNC: 2 MG/DL (ref 1.5–2.5)
MCH RBC QN AUTO: 30.8 PG (ref 27–33)
MCHC RBC AUTO-ENTMCNC: 33.6 G/DL (ref 33.6–35)
MCV RBC AUTO: 91.7 FL (ref 81.4–97.8)
MONOCYTES # BLD AUTO: 0.8 K/UL (ref 0–0.85)
MONOCYTES NFR BLD AUTO: 9.5 % (ref 0–13.4)
NEUTROPHILS # BLD AUTO: 4.44 K/UL (ref 2–7.15)
NEUTROPHILS NFR BLD: 52.6 % (ref 44–72)
NRBC # BLD AUTO: 0 K/UL
NRBC BLD-RTO: 0 /100 WBC
PLATELET # BLD AUTO: 235 K/UL (ref 164–446)
PMV BLD AUTO: 9.4 FL (ref 9–12.9)
POTASSIUM SERPL-SCNC: 3.6 MMOL/L (ref 3.6–5.5)
PROT SERPL-MCNC: 7.3 G/DL (ref 6–8.2)
RBC # BLD AUTO: 4.22 M/UL (ref 4.2–5.4)
SODIUM SERPL-SCNC: 135 MMOL/L (ref 135–145)
TROPONIN I SERPL-MCNC: <0.02 NG/ML (ref 0–0.04)
WBC # BLD AUTO: 8.4 K/UL (ref 4.8–10.8)

## 2018-11-25 PROCEDURE — 93005 ELECTROCARDIOGRAM TRACING: CPT

## 2018-11-25 PROCEDURE — 80053 COMPREHEN METABOLIC PANEL: CPT

## 2018-11-25 PROCEDURE — A9270 NON-COVERED ITEM OR SERVICE: HCPCS | Performed by: HOSPITALIST

## 2018-11-25 PROCEDURE — 71045 X-RAY EXAM CHEST 1 VIEW: CPT

## 2018-11-25 PROCEDURE — 85025 COMPLETE CBC W/AUTO DIFF WBC: CPT

## 2018-11-25 PROCEDURE — 93005 ELECTROCARDIOGRAM TRACING: CPT | Performed by: EMERGENCY MEDICINE

## 2018-11-25 PROCEDURE — 99214 OFFICE O/P EST MOD 30 MIN: CPT | Performed by: PHYSICIAN ASSISTANT

## 2018-11-25 PROCEDURE — 94760 N-INVAS EAR/PLS OXIMETRY 1: CPT

## 2018-11-25 PROCEDURE — A9270 NON-COVERED ITEM OR SERVICE: HCPCS | Performed by: EMERGENCY MEDICINE

## 2018-11-25 PROCEDURE — 700101 HCHG RX REV CODE 250: Performed by: EMERGENCY MEDICINE

## 2018-11-25 PROCEDURE — 96374 THER/PROPH/DIAG INJ IV PUSH: CPT

## 2018-11-25 PROCEDURE — 700102 HCHG RX REV CODE 250 W/ 637 OVERRIDE(OP): Performed by: HOSPITALIST

## 2018-11-25 PROCEDURE — 83735 ASSAY OF MAGNESIUM: CPT

## 2018-11-25 PROCEDURE — 36415 COLL VENOUS BLD VENIPUNCTURE: CPT

## 2018-11-25 PROCEDURE — 700102 HCHG RX REV CODE 250 W/ 637 OVERRIDE(OP): Performed by: EMERGENCY MEDICINE

## 2018-11-25 PROCEDURE — 83880 ASSAY OF NATRIURETIC PEPTIDE: CPT

## 2018-11-25 PROCEDURE — G0378 HOSPITAL OBSERVATION PER HR: HCPCS

## 2018-11-25 PROCEDURE — 99285 EMERGENCY DEPT VISIT HI MDM: CPT

## 2018-11-25 PROCEDURE — 84484 ASSAY OF TROPONIN QUANT: CPT

## 2018-11-25 PROCEDURE — 99220 PR INITIAL OBSERVATION CARE,LEVL III: CPT | Performed by: HOSPITALIST

## 2018-11-25 RX ORDER — ACETAMINOPHEN 325 MG/1
650 TABLET ORAL EVERY 6 HOURS PRN
Status: DISCONTINUED | OUTPATIENT
Start: 2018-11-25 | End: 2018-11-26 | Stop reason: HOSPADM

## 2018-11-25 RX ORDER — OXYCODONE HYDROCHLORIDE 5 MG/1
10 TABLET ORAL
Status: DISCONTINUED | OUTPATIENT
Start: 2018-11-25 | End: 2018-11-26

## 2018-11-25 RX ORDER — ALBUTEROL SULFATE 90 UG/1
2 AEROSOL, METERED RESPIRATORY (INHALATION) EVERY 6 HOURS PRN
Status: DISCONTINUED | OUTPATIENT
Start: 2018-11-25 | End: 2018-11-26 | Stop reason: HOSPADM

## 2018-11-25 RX ORDER — BUDESONIDE AND FORMOTEROL FUMARATE DIHYDRATE 160; 4.5 UG/1; UG/1
2 AEROSOL RESPIRATORY (INHALATION)
Status: DISCONTINUED | OUTPATIENT
Start: 2018-11-26 | End: 2018-11-26

## 2018-11-25 RX ORDER — OXYCODONE HYDROCHLORIDE 5 MG/1
5 TABLET ORAL
Status: DISCONTINUED | OUTPATIENT
Start: 2018-11-25 | End: 2018-11-26

## 2018-11-25 RX ORDER — TRAZODONE HYDROCHLORIDE 50 MG/1
50 TABLET ORAL NIGHTLY
Status: DISCONTINUED | OUTPATIENT
Start: 2018-11-25 | End: 2018-11-26 | Stop reason: HOSPADM

## 2018-11-25 RX ORDER — HYDROMORPHONE HYDROCHLORIDE 1 MG/ML
0.5 INJECTION, SOLUTION INTRAMUSCULAR; INTRAVENOUS; SUBCUTANEOUS
Status: DISCONTINUED | OUTPATIENT
Start: 2018-11-25 | End: 2018-11-26

## 2018-11-25 RX ORDER — LOSARTAN POTASSIUM 25 MG/1
100 TABLET ORAL DAILY
Status: DISCONTINUED | OUTPATIENT
Start: 2018-11-26 | End: 2018-11-26 | Stop reason: HOSPADM

## 2018-11-25 RX ORDER — LABETALOL HYDROCHLORIDE 5 MG/ML
10 INJECTION, SOLUTION INTRAVENOUS EVERY 4 HOURS PRN
Status: DISCONTINUED | OUTPATIENT
Start: 2018-11-25 | End: 2018-11-26 | Stop reason: HOSPADM

## 2018-11-25 RX ORDER — LABETALOL HYDROCHLORIDE 5 MG/ML
20 INJECTION, SOLUTION INTRAVENOUS ONCE
Status: COMPLETED | OUTPATIENT
Start: 2018-11-25 | End: 2018-11-25

## 2018-11-25 RX ORDER — AMOXICILLIN 250 MG
2 CAPSULE ORAL 2 TIMES DAILY
Status: DISCONTINUED | OUTPATIENT
Start: 2018-11-25 | End: 2018-11-26 | Stop reason: HOSPADM

## 2018-11-25 RX ORDER — HYDROCODONE BITARTRATE AND ACETAMINOPHEN 10; 325 MG/1; MG/1
1 TABLET ORAL ONCE
Status: COMPLETED | OUTPATIENT
Start: 2018-11-25 | End: 2018-11-25

## 2018-11-25 RX ORDER — ASPIRIN 81 MG/1
324 TABLET, CHEWABLE ORAL DAILY
Status: DISCONTINUED | OUTPATIENT
Start: 2018-11-26 | End: 2018-11-26 | Stop reason: HOSPADM

## 2018-11-25 RX ORDER — FLUTICASONE PROPIONATE 50 MCG
1 SPRAY, SUSPENSION (ML) NASAL DAILY
Status: DISCONTINUED | OUTPATIENT
Start: 2018-11-26 | End: 2018-11-26 | Stop reason: HOSPADM

## 2018-11-25 RX ORDER — POLYETHYLENE GLYCOL 3350 17 G/17G
1 POWDER, FOR SOLUTION ORAL
Status: DISCONTINUED | OUTPATIENT
Start: 2018-11-25 | End: 2018-11-26 | Stop reason: HOSPADM

## 2018-11-25 RX ORDER — BISACODYL 10 MG
10 SUPPOSITORY, RECTAL RECTAL
Status: DISCONTINUED | OUTPATIENT
Start: 2018-11-25 | End: 2018-11-26 | Stop reason: HOSPADM

## 2018-11-25 RX ORDER — AMLODIPINE BESYLATE 5 MG/1
10 TABLET ORAL DAILY
Status: DISCONTINUED | OUTPATIENT
Start: 2018-11-26 | End: 2018-11-26 | Stop reason: HOSPADM

## 2018-11-25 RX ORDER — ASPIRIN 600 MG/1
300 SUPPOSITORY RECTAL DAILY
Status: DISCONTINUED | OUTPATIENT
Start: 2018-11-26 | End: 2018-11-26 | Stop reason: HOSPADM

## 2018-11-25 RX ORDER — MONTELUKAST SODIUM 10 MG/1
10 TABLET ORAL EVERY MORNING
Status: DISCONTINUED | OUTPATIENT
Start: 2018-11-26 | End: 2018-11-26 | Stop reason: HOSPADM

## 2018-11-25 RX ORDER — DULOXETIN HYDROCHLORIDE 30 MG/1
60 CAPSULE, DELAYED RELEASE ORAL DAILY
Status: DISCONTINUED | OUTPATIENT
Start: 2018-11-26 | End: 2018-11-26 | Stop reason: HOSPADM

## 2018-11-25 RX ORDER — OMEPRAZOLE 20 MG/1
20 CAPSULE, DELAYED RELEASE ORAL 2 TIMES DAILY
Status: DISCONTINUED | OUTPATIENT
Start: 2018-11-25 | End: 2018-11-26 | Stop reason: HOSPADM

## 2018-11-25 RX ORDER — AMOXICILLIN AND CLAVULANATE POTASSIUM 875; 125 MG/1; MG/1
1 TABLET, FILM COATED ORAL EVERY 12 HOURS
Status: DISCONTINUED | OUTPATIENT
Start: 2018-11-25 | End: 2018-11-26 | Stop reason: HOSPADM

## 2018-11-25 RX ORDER — GABAPENTIN 300 MG/1
300 CAPSULE ORAL 3 TIMES DAILY
Status: DISCONTINUED | OUTPATIENT
Start: 2018-11-25 | End: 2018-11-26 | Stop reason: HOSPADM

## 2018-11-25 RX ORDER — ASPIRIN 325 MG
325 TABLET ORAL DAILY
Status: DISCONTINUED | OUTPATIENT
Start: 2018-11-26 | End: 2018-11-26 | Stop reason: HOSPADM

## 2018-11-25 RX ADMIN — TRAZODONE HYDROCHLORIDE 50 MG: 50 TABLET ORAL at 21:55

## 2018-11-25 RX ADMIN — HYDROCODONE BITARTRATE AND ACETAMINOPHEN 1 TABLET: 10; 325 TABLET ORAL at 19:52

## 2018-11-25 RX ADMIN — OMEPRAZOLE 20 MG: 20 CAPSULE, DELAYED RELEASE ORAL at 21:55

## 2018-11-25 RX ADMIN — STANDARDIZED SENNA CONCENTRATE AND DOCUSATE SODIUM 2 TABLET: 8.6; 5 TABLET, FILM COATED ORAL at 21:55

## 2018-11-25 RX ADMIN — LABETALOL HYDROCHLORIDE 20 MG: 5 INJECTION, SOLUTION INTRAVENOUS at 19:52

## 2018-11-25 RX ADMIN — AMOXICILLIN AND CLAVULANATE POTASSIUM 1 TABLET: 875; 125 TABLET, FILM COATED ORAL at 21:55

## 2018-11-25 RX ADMIN — GABAPENTIN 300 MG: 300 CAPSULE ORAL at 21:55

## 2018-11-25 ASSESSMENT — COGNITIVE AND FUNCTIONAL STATUS - GENERAL
MOBILITY SCORE: 22
SUGGESTED CMS G CODE MODIFIER DAILY ACTIVITY: CH
DAILY ACTIVITIY SCORE: 24
WALKING IN HOSPITAL ROOM: A LITTLE
CLIMB 3 TO 5 STEPS WITH RAILING: A LITTLE
SUGGESTED CMS G CODE MODIFIER MOBILITY: CJ

## 2018-11-25 ASSESSMENT — ENCOUNTER SYMPTOMS
MYALGIAS: 0
CHILLS: 0
PHOTOPHOBIA: 0
MEMORY LOSS: 0
CLAUDICATION: 0
FEVER: 0
HEMOPTYSIS: 0
NAUSEA: 0
PALPITATIONS: 0
DEPRESSION: 0
CONSTIPATION: 0
SPEECH CHANGE: 0
SORE THROAT: 0
BACK PAIN: 0
HEARTBURN: 0
SENSORY CHANGE: 0
NERVOUS/ANXIOUS: 0
WEAKNESS: 0
COUGH: 0
BLOOD IN STOOL: 0
TINGLING: 0
NECK PAIN: 0
TREMORS: 0
HEADACHES: 1
SPUTUM PRODUCTION: 0
SHORTNESS OF BREATH: 0
STRIDOR: 0
ORTHOPNEA: 0
DIZZINESS: 0
EYE PAIN: 0
PND: 0
DOUBLE VISION: 0
BLURRED VISION: 0
VOMITING: 0

## 2018-11-25 ASSESSMENT — PAIN SCALES - GENERAL
PAINLEVEL_OUTOF10: 2
PAINLEVEL_OUTOF10: 0
PAINLEVEL_OUTOF10: 0

## 2018-11-25 ASSESSMENT — PATIENT HEALTH QUESTIONNAIRE - PHQ9
2. FEELING DOWN, DEPRESSED, IRRITABLE, OR HOPELESS: NOT AT ALL
SUM OF ALL RESPONSES TO PHQ9 QUESTIONS 1 AND 2: 0
1. LITTLE INTEREST OR PLEASURE IN DOING THINGS: NOT AT ALL

## 2018-11-25 ASSESSMENT — COPD QUESTIONNAIRES
IN THE PAST 12 MONTHS DO YOU DO LESS THAN YOU USED TO BECAUSE OF YOUR BREATHING PROBLEMS: DISAGREE/UNSURE
COPD SCREENING SCORE: 4
DURING THE PAST 4 WEEKS HOW MUCH DID YOU FEEL SHORT OF BREATH: NONE/LITTLE OF THE TIME
HAVE YOU SMOKED AT LEAST 100 CIGARETTES IN YOUR ENTIRE LIFE: YES
DO YOU EVER COUGH UP ANY MUCUS OR PHLEGM?: NO/ONLY WITH OCCASIONAL COLDS OR INFECTIONS

## 2018-11-25 ASSESSMENT — LIFESTYLE VARIABLES: EVER_SMOKED: YES

## 2018-11-26 ENCOUNTER — APPOINTMENT (OUTPATIENT)
Dept: CARDIOLOGY | Facility: MEDICAL CENTER | Age: 73
End: 2018-11-26
Attending: HOSPITALIST
Payer: MEDICARE

## 2018-11-26 ENCOUNTER — PATIENT OUTREACH (OUTPATIENT)
Dept: HEALTH INFORMATION MANAGEMENT | Facility: OTHER | Age: 73
End: 2018-11-26

## 2018-11-26 VITALS
BODY MASS INDEX: 48.28 KG/M2 | OXYGEN SATURATION: 95 % | TEMPERATURE: 97.8 F | HEART RATE: 74 BPM | DIASTOLIC BLOOD PRESSURE: 47 MMHG | WEIGHT: 262.35 LBS | SYSTOLIC BLOOD PRESSURE: 116 MMHG | RESPIRATION RATE: 18 BRPM | HEIGHT: 62 IN

## 2018-11-26 LAB
ALBUMIN SERPL BCP-MCNC: 3.2 G/DL (ref 3.2–4.9)
ALBUMIN/GLOB SERPL: 1.1 G/DL
ALP SERPL-CCNC: 94 U/L (ref 30–99)
ALT SERPL-CCNC: 16 U/L (ref 2–50)
ANION GAP SERPL CALC-SCNC: 5 MMOL/L (ref 0–11.9)
AST SERPL-CCNC: 20 U/L (ref 12–45)
BASOPHILS # BLD AUTO: 0.8 % (ref 0–1.8)
BASOPHILS # BLD: 0.06 K/UL (ref 0–0.12)
BILIRUB SERPL-MCNC: 0.5 MG/DL (ref 0.1–1.5)
BUN SERPL-MCNC: 14 MG/DL (ref 8–22)
CALCIUM SERPL-MCNC: 8.1 MG/DL (ref 8.4–10.2)
CHLORIDE SERPL-SCNC: 105 MMOL/L (ref 96–112)
CO2 SERPL-SCNC: 26 MMOL/L (ref 20–33)
CREAT SERPL-MCNC: 0.91 MG/DL (ref 0.5–1.4)
EKG IMPRESSION: NORMAL
EOSINOPHIL # BLD AUTO: 0.58 K/UL (ref 0–0.51)
EOSINOPHIL NFR BLD: 7.9 % (ref 0–6.9)
ERYTHROCYTE [DISTWIDTH] IN BLOOD BY AUTOMATED COUNT: 46.3 FL (ref 35.9–50)
GLOBULIN SER CALC-MCNC: 2.9 G/DL (ref 1.9–3.5)
GLUCOSE SERPL-MCNC: 111 MG/DL (ref 65–99)
HCT VFR BLD AUTO: 35.7 % (ref 37–47)
HGB BLD-MCNC: 11.8 G/DL (ref 12–16)
IMM GRANULOCYTES # BLD AUTO: 0.01 K/UL (ref 0–0.11)
IMM GRANULOCYTES NFR BLD AUTO: 0.1 % (ref 0–0.9)
LYMPHOCYTES # BLD AUTO: 2.38 K/UL (ref 1–4.8)
LYMPHOCYTES NFR BLD: 32.5 % (ref 22–41)
MCH RBC QN AUTO: 30.6 PG (ref 27–33)
MCHC RBC AUTO-ENTMCNC: 33.1 G/DL (ref 33.6–35)
MCV RBC AUTO: 92.7 FL (ref 81.4–97.8)
MONOCYTES # BLD AUTO: 0.72 K/UL (ref 0–0.85)
MONOCYTES NFR BLD AUTO: 9.8 % (ref 0–13.4)
NEUTROPHILS # BLD AUTO: 3.57 K/UL (ref 2–7.15)
NEUTROPHILS NFR BLD: 48.9 % (ref 44–72)
NRBC # BLD AUTO: 0 K/UL
NRBC BLD-RTO: 0 /100 WBC
PLATELET # BLD AUTO: 216 K/UL (ref 164–446)
PMV BLD AUTO: 9.2 FL (ref 9–12.9)
POTASSIUM SERPL-SCNC: 3.3 MMOL/L (ref 3.6–5.5)
PROT SERPL-MCNC: 6.1 G/DL (ref 6–8.2)
RBC # BLD AUTO: 3.85 M/UL (ref 4.2–5.4)
SODIUM SERPL-SCNC: 136 MMOL/L (ref 135–145)
TROPONIN I SERPL-MCNC: <0.02 NG/ML (ref 0–0.04)
WBC # BLD AUTO: 7.3 K/UL (ref 4.8–10.8)

## 2018-11-26 PROCEDURE — 96372 THER/PROPH/DIAG INJ SC/IM: CPT

## 2018-11-26 PROCEDURE — 80053 COMPREHEN METABOLIC PANEL: CPT

## 2018-11-26 PROCEDURE — 94760 N-INVAS EAR/PLS OXIMETRY 1: CPT

## 2018-11-26 PROCEDURE — 700102 HCHG RX REV CODE 250 W/ 637 OVERRIDE(OP): Performed by: HOSPITALIST

## 2018-11-26 PROCEDURE — 700111 HCHG RX REV CODE 636 W/ 250 OVERRIDE (IP): Performed by: HOSPITALIST

## 2018-11-26 PROCEDURE — 93306 TTE W/DOPPLER COMPLETE: CPT

## 2018-11-26 PROCEDURE — 700117 HCHG RX CONTRAST REV CODE 255: Performed by: HOSPITALIST

## 2018-11-26 PROCEDURE — G0378 HOSPITAL OBSERVATION PER HR: HCPCS

## 2018-11-26 PROCEDURE — 85025 COMPLETE CBC W/AUTO DIFF WBC: CPT

## 2018-11-26 PROCEDURE — 84484 ASSAY OF TROPONIN QUANT: CPT

## 2018-11-26 PROCEDURE — 93010 ELECTROCARDIOGRAM REPORT: CPT | Performed by: INTERNAL MEDICINE

## 2018-11-26 PROCEDURE — 93306 TTE W/DOPPLER COMPLETE: CPT | Mod: 26 | Performed by: INTERNAL MEDICINE

## 2018-11-26 PROCEDURE — 99217 PR OBSERVATION CARE DISCHARGE: CPT | Performed by: INTERNAL MEDICINE

## 2018-11-26 PROCEDURE — 93005 ELECTROCARDIOGRAM TRACING: CPT | Performed by: HOSPITALIST

## 2018-11-26 PROCEDURE — A9270 NON-COVERED ITEM OR SERVICE: HCPCS | Performed by: HOSPITALIST

## 2018-11-26 RX ORDER — AMOXICILLIN AND CLAVULANATE POTASSIUM 875; 125 MG/1; MG/1
1 TABLET, FILM COATED ORAL 2 TIMES DAILY
COMMUNITY
Start: 2018-11-23 | End: 2019-01-05 | Stop reason: CLARIF

## 2018-11-26 RX ORDER — BUDESONIDE AND FORMOTEROL FUMARATE DIHYDRATE 160; 4.5 UG/1; UG/1
2 AEROSOL RESPIRATORY (INHALATION) 2 TIMES DAILY
Status: DISCONTINUED | OUTPATIENT
Start: 2018-11-26 | End: 2018-11-26 | Stop reason: HOSPADM

## 2018-11-26 RX ORDER — POTASSIUM CHLORIDE 20 MEQ/1
20 TABLET, EXTENDED RELEASE ORAL 2 TIMES DAILY
Status: DISCONTINUED | OUTPATIENT
Start: 2018-11-26 | End: 2018-11-26 | Stop reason: HOSPADM

## 2018-11-26 RX ORDER — GABAPENTIN 300 MG/1
300 CAPSULE ORAL 4 TIMES DAILY
COMMUNITY

## 2018-11-26 RX ORDER — POTASSIUM CHLORIDE 20 MEQ/1
20 TABLET, EXTENDED RELEASE ORAL 2 TIMES DAILY
Qty: 6 TAB | Refills: 0 | Status: SHIPPED | OUTPATIENT
Start: 2018-11-26 | End: 2020-10-02

## 2018-11-26 RX ORDER — HYDROCHLOROTHIAZIDE 25 MG/1
TABLET ORAL
Qty: 30 TAB | Refills: 1 | Status: SHIPPED | OUTPATIENT
Start: 2018-11-26 | End: 2019-11-07

## 2018-11-26 RX ADMIN — LOSARTAN POTASSIUM 100 MG: 25 TABLET, FILM COATED ORAL at 06:12

## 2018-11-26 RX ADMIN — HUMAN ALBUMIN MICROSPHERES AND PERFLUTREN 3 ML: 10; .22 INJECTION, SOLUTION INTRAVENOUS at 09:00

## 2018-11-26 RX ADMIN — GABAPENTIN 300 MG: 300 CAPSULE ORAL at 06:12

## 2018-11-26 RX ADMIN — ENOXAPARIN SODIUM 40 MG: 100 INJECTION SUBCUTANEOUS at 06:13

## 2018-11-26 RX ADMIN — ASPIRIN 325 MG ORAL TABLET 325 MG: 325 PILL ORAL at 06:10

## 2018-11-26 RX ADMIN — MONTELUKAST SODIUM 10 MG: 10 TABLET, FILM COATED ORAL at 06:11

## 2018-11-26 RX ADMIN — AMLODIPINE BESYLATE 10 MG: 5 TABLET ORAL at 06:10

## 2018-11-26 RX ADMIN — STANDARDIZED SENNA CONCENTRATE AND DOCUSATE SODIUM 2 TABLET: 8.6; 5 TABLET, FILM COATED ORAL at 06:10

## 2018-11-26 RX ADMIN — OMEPRAZOLE 20 MG: 20 CAPSULE, DELAYED RELEASE ORAL at 08:14

## 2018-11-26 RX ADMIN — FLUTICASONE PROPIONATE 50 MCG: 50 SPRAY, METERED NASAL at 06:14

## 2018-11-26 RX ADMIN — AMOXICILLIN AND CLAVULANATE POTASSIUM 1 TABLET: 875; 125 TABLET, FILM COATED ORAL at 06:09

## 2018-11-26 RX ADMIN — BUDESONIDE AND FORMOTEROL FUMARATE DIHYDRATE 2 PUFF: 160; 4.5 AEROSOL RESPIRATORY (INHALATION) at 07:41

## 2018-11-26 RX ADMIN — DULOXETINE 60 MG: 30 CAPSULE, DELAYED RELEASE ORAL at 06:11

## 2018-11-26 RX ADMIN — OXYCODONE HYDROCHLORIDE 10 MG: 5 TABLET ORAL at 08:14

## 2018-11-26 ASSESSMENT — PAIN SCALES - GENERAL: PAINLEVEL_OUTOF10: 7

## 2018-11-26 NOTE — CARE PLAN
Problem: Venous Thromboembolism (VTW)/Deep Vein Thrombosis (DVT) Prevention:  Goal: Patient will participate in Venous Thrombosis (VTE)/Deep Vein Thrombosis (DVT)Prevention Measures  Outcome: PROGRESSING AS EXPECTED  Educated on SCDs and Lovenox. Pt verbalizes understanding and is compliant with treatment.

## 2018-11-26 NOTE — PROGRESS NOTES
Tele summary: SR with rare PVCs. .16/.08/.38    DC by WC with family. No questions on DC summary. Verbalized understanding. No CP/SOB. VS WNL.

## 2018-11-26 NOTE — ASSESSMENT & PLAN NOTE
SBP>200 on admission, with headache.   Patient usually does not check her blood pressure at home, as as result we are unsure what her normal baseline is  We have doubled her Norvasc dose to 10 mg  And you losartan 100 mg  IV as needed enalaprilat and labetalol as needed of SBP over 170  We will decide whether to add a third hypertensive agent  In the ER was 140s systolic.

## 2018-11-26 NOTE — FLOWSHEET NOTE
11/26/18 0742   Events/Summary/Plan   Events/Summary/Plan Tx given   Interdisciplinary Plan of Care-Goals (Indications)   Bronchodilator Indications History / Diagnosis   Interdisciplinary Plan of Care-Outcomes    Bronchodilator Outcome Patient at Stable Baseline   Education   Education Yes - Pt. / Family has been Instructed in use of Respiratory Medications and Adverse Reactions   RT Assessment of Delivered Medications   Evaluation of Medication Delivery Daily Yes-- Pt /Family has been Instructed in use of Respiratory Medications and Adverse Reactions   MDI/DPI Group   #MDI/DPI Given MDI/DPI x 1   Respiratory WDL   Respiratory (WDL) X   Chest Exam   Respiration 18   Pulse 75   Breath Sounds   Pre/Post Intervention Post Intervention Assessment   RUL Breath Sounds Clear   RML Breath Sounds Clear   RLL Breath Sounds Clear   MIGUEL Breath Sounds Clear   LLL Breath Sounds Clear   Oximetry   #Pulse Oximetry (Single Determination) Yes   Oxygen   Pulse Oximetry 95 %   O2 (LPM) 0   O2 Daily Delivery Respiratory  Room Air with O2 Available

## 2018-11-26 NOTE — DIETARY
"Nutrition services: Day 0 of admit.  Cyndy Petit is a 73 y.o. female with admitting DX of hypertensive urgency.     Pt with BMI > 40. BMI of 47.98.     Pt with PMH including Thrush, BEATRIZ, HTN, Hiatal Hernia, GERD, DJD, Depression, Carpal Tunnel Syndrome, Asthma and Arthritis. Pt presented to H&P with headache, chest pain and SOB x 1-2 days. Pt recently started on Augmentin for sinusitis (2 days ago). Per MD assessment and plan, pt presented with SBP >200 and headache. MD pursueing strict blood pressure control and an echo to evaluate. Current diet order of Cardiac 2 gram and PO intake of 50-75% x 1 and adequate. Weight loss counseling not appropriate in acute care setting. RD available for further consult as needed.     Assessment:  Height: 157.5 cm (5' 2\")  Weight: 119 kg (262 lb 5.6 oz)  Body mass index is 47.98 kg/m².  Diet/Intake: Cardiac 2 gram     Evaluation:   1. Meds:Augmentin, ASA, Cymbalta, Losartan 100mg, Prilosec 20mg BID, Senna BID  2. Fluids: No IVF at this time.   3. Skin: per RN note Skin WDL with exception of small scab near R elbow   4. GI/: Last BM 11/25   5. Labs: Hgb 11.8 Hct 35.7 K 3.3 Glucose 111 Ca 8.1     Recommendations/Plan:Referral to outpatient nutrition services for weight management after D/C.   1. Diet as ordered.   2. Encourage intake of 50% or greater.   3. Document intake of all meals  as % taken in ADL's to provide interdisciplinary communication across all shifts.   4. Monitor weight.  5. Nutrition rep will continue to see patient for ongoing meal and snack preferences.             "

## 2018-11-26 NOTE — CARE PLAN
Problem: Safety  Goal: Will remain free from injury  Outcome: PROGRESSING AS EXPECTED  Up self. Steady with cane  Goal: Will remain free from falls  Outcome: PROGRESSING AS EXPECTED      Problem: Knowledge Deficit  Goal: Knowledge of disease process/condition, treatment plan, diagnostic tests, and medications will improve  Outcome: PROGRESSING AS EXPECTED  Verbalized understanding of treatment plan

## 2018-11-26 NOTE — ED NOTES
Assumed care of pt-saline lock with blood draw. Denies c/o other than bp cuff to mandi. Therefore chgs to rt lower arm per pt request

## 2018-11-26 NOTE — ASSESSMENT & PLAN NOTE
Patient was recently diagnosed with sinusitis was given oral Augmentin, patient says she only took 1 day of this, hence we will continue this for additional 5 days

## 2018-11-26 NOTE — PROGRESS NOTES
Pt arrived to room 309-1 from ER via stretcher with RN, CNA, and multiple family members. Pt ambulated with cane and standby assist to bed. Pt denies pain at this time. VSS. Telemetry monitoring initiated. Lungs clear on auscultation. POC discussed and questions answered. Pt oriented to room and call bell system and instructed to call for assistance before getting out of bed.

## 2018-11-26 NOTE — ASSESSMENT & PLAN NOTE
EKG and initial troponins are negative  Suspect chest pain due to hypertensive urgency, patient has had a nuclear stress test last year which was negative.  I will not repeat  Her chest pain is gone currently  Continue to trend serial troponins every 4 hours x3  Echocardiogram ordered and pending  Continue with aspirin and statin

## 2018-11-26 NOTE — PROGRESS NOTES
Med rec completed per pt  Allergies reviewed  Pt states that she started a 10 day course of Augmentin on 11/23/18

## 2018-11-26 NOTE — ED PROVIDER NOTES
"ED Provider Note    CHIEF COMPLAINT  Chief Complaint   Patient presents with   • Hypertension   • Shortness of Breath         HPI  Cyndy Petit is a 73 y.o. female who presents to the ED secondary to headache, chest pain, shortness of breath.  The patient's been feeling headache, shortness of breath over the last 1-2 days, pressure behind bilateral eyes.  The patient was started on Augmentin.  Patient does state that she feels short of breath, constant, also some central chest pain, nonradiating.  She is been having hot and cold flat flashes, checked her blood pressure and has been biliary elevated.  She has been taking her blood pressure medicines actually took an additional doses seem to help her symptoms.  The patient was seen at urgent care and sent the patient here.  The patient is having some lower extremity swelling associated with it.  Patient does not have a history of a heart attack, did have a normal stress test proximally 18 months ago.    REVIEW OF SYSTEMS  See HPI for further details. All other systems are negative.     PAST MEDICAL HISTORY  Past Medical History:   Diagnosis Date   • Arthritis    • Asthma    • Carpal tunnel syndrome    • Cold 4-8-2017    cold; taking antibiotics  4-   • Depression    • DJD (degenerative joint disease)    • GERD (gastroesophageal reflux disease)    • GERD (gastroesophageal reflux disease)    • Heart burn    • Hiatus hernia syndrome    • Hypertension    • Obesity    • Obstructive sleep apnea    • Pain     back, knees   • Sleep apnea     CPAP   • Snoring    • Sorethroat 2/09/2016   • Thrush     from \"Advair\", takes nystatin   • Urinary incontinence        FAMILY HISTORY  Family History   Problem Relation Age of Onset   • Heart Disease Father    • Arthritis Father    • Lung Disease Father         asthma   • Psychiatry Father         depression   • Alcohol/Drug Father    • Depression Father    • Hypertension Mother    • Cancer Mother    • Heart Disease Mother "    • Arthritis Mother    • Stroke Mother    • Hyperlipidemia Unknown    • Alcohol/Drug Brother    • Depression Daughter    • Drug abuse Son         in remission   • Bipolar disorder Grandchild    • Genetic Neg Hx    • Diabetes Neg Hx        SOCIAL HISTORY  Social History     Social History   • Marital status:      Spouse name: N/A   • Number of children: N/A   • Years of education: N/A     Social History Main Topics   • Smoking status: Former Smoker     Packs/day: 1.00     Years: 6.00     Types: Cigarettes     Start date: 1/1/1972     Quit date: 1/1/1978   • Smokeless tobacco: Never Used      Comment: 1978   • Alcohol use No   • Drug use: No   • Sexual activity: Not on file     Other Topics Concern   • Not on file     Social History Narrative    ** Merged History Encounter **            SURGICAL HISTORY  Past Surgical History:   Procedure Laterality Date   • KNEE ARTHROPLASTY TOTAL Right 5/1/2017    Procedure: KNEE ARTHROPLASTY TOTAL;  Surgeon: Jesús Rutledge M.D.;  Location: [facility];  Service:    • KNEE ARTHROPLASTY TOTAL Left 3/7/2016    Procedure: KNEE ARTHROPLASTY TOTAL;  Surgeon: Jesús Rutledge M.D.;  Location: [facility];  Service:    • LUMBAR FUSION POSTERIOR  9/13/2013    Performed by Kaushal Ayala M.D. at SURGERY [facility]   • LUMBAR LAMINECTOMY DISKECTOMY  9/13/2013    Performed by Kaushal [facility]ala M.D. at [facility]   • LUMBAR LAMINECTOMY DISKECTOMY  5/30/2013    Performed by Kaushal Ayala M.D. at [facility]   • CHOLECYSTECTOMY  2010    laparoscopic   • OTHER NEUROLOGICAL SURG  2/2009    I&D of brain from sinus infection   • FORAMINOTOMY  11/26/08    Performed by [facility] STALLWORTH at SURGERY [facility]   • HARDWARE REMOVAL NEURO  11/26/08    Performed by [facility] STALLWORTH at [facility]   • KNEE ARTHROSCOPY Right 2008   • LUMBAR FUSION POSTERIOR  2005   • CARPAL TUNNEL RELEASE Right 2000   • SHOULDER ARTHROTOMY  "Right 2000   • EYE SURGERY Bilateral     Cataract surgery 2/2018, 3/2018       CURRENT MEDICATIONS  Home Medications     Reviewed by Lencho Lara R.N. (Registered Nurse) on 11/25/18 at 1728  Med List Status: Partial   Medication Last Dose Status   albuterol (PROAIR HFA) 108 (90 BASE) MCG/ACT AERS PRN Active   amLODIPine (NORVASC) 5 MG Tab Taking Active   diphenhydrAMINE (BENADRYL) 50 MG Cap Taking Active   docusate sodium (COLACE) 100 MG Cap PRN Active   duloxetine (CYMBALTA) 60 MG Cap DR Particles delayed-release capsule Taking Active   fluticasone (FLONASE) 50 MCG/ACT nasal spray Taking Active   fluticasone-salmeterol (ADVAIR DISKUS) 500-50 MCG/DOSE AEROSOL POWDER, BREATH ACTIVATED Taking Active   gabapentin (NEURONTIN) 300 MG CAPS Taking Active   hydrocodone-acetaminophen (NORCO) 5-325 MG Tab per tablet Taking Active   losartan (COZAAR) 100 MG TABS Taking Active   montelukast (SINGULAIR) 10 MG TABS Taking Active   Multiple Vitamins-Minerals (HAIR SKIN AND NAILS FORMULA) Tab 8/26/2018 Active   multivitamin (THERAGRAN) Tab Taking Active   nystatin (MYCOSTATIN) 714966 UNIT/ML SUSP Taking Active   nystatin/triamcinolone (MYCOLOG) 964434-5.1 UNIT/GM-% Cream PRN Active   omeprazole (PRILOSEC) 20 MG CPDR Taking Active   trazodone (DESYREL) 50 MG TABS Taking Active                ALLERGIES  Allergies   Allergen Reactions   • Allergy      Unknown antibiotic   • Cefepime Hives   • Cephalosporins Hives   • Clarithromycin Hives   • Cleocin [Clindamycin] Itching   • Meropenem Hives   • Morphine Itching   • Sulfamethoxazole-Trimethoprim Itching       PHYSICAL EXAM  VITAL SIGNS: BP (!) 175/82   Pulse 71   Temp 36.4 °C (97.6 °F) (Temporal)   Resp 20   Ht 1.575 m (5' 2\")   Wt 114 kg (251 lb 5.2 oz)   SpO2 95%   BMI 45.97 kg/m²   Constitutional: Well developed, Well nourished, mild distress, Non-toxic appearance.   HENT: Normocephalic, Atraumatic, Bilateral external ears normal, Oropharynx moist, No oral exudates, Nose " normal.   Eyes: PERRLA, EOMI, Conjunctiva normal, No discharge.   Neck: Normal range of motion, No tenderness, Supple, No stridor.   Cardiovascular: Regular rate and rhythm, no murmurs  Thorax & Lungs: To auscultation bilaterally  Abdomen: Bowel sounds normal, Soft, No tenderness, No masses, No pulsatile masses.   Skin: Warm, Dry, No erythema, No rash.   Back: No tenderness, No CVA tenderness.   Extremities: Intact distal pulses, No tenderness, No cyanosis, No clubbing.  Trace lower extremity edema  Neurologic: Alert & oriented x 3, Normal motor function, Normal sensory function, No focal deficits noted.   Results for orders placed or performed during the hospital encounter of 11/25/18   CBC w/ Differential   Result Value Ref Range    WBC 8.4 4.8 - 10.8 K/uL    RBC 4.22 4.20 - 5.40 M/uL    Hemoglobin 13.0 12.0 - 16.0 g/dL    Hematocrit 38.7 37.0 - 47.0 %    MCV 91.7 81.4 - 97.8 fL    MCH 30.8 27.0 - 33.0 pg    MCHC 33.6 33.6 - 35.0 g/dL    RDW 45.8 35.9 - 50.0 fL    Platelet Count 235 164 - 446 K/uL    MPV 9.4 9.0 - 12.9 fL    Neutrophils-Polys 52.60 44.00 - 72.00 %    Lymphocytes 29.70 22.00 - 41.00 %    Monocytes 9.50 0.00 - 13.40 %    Eosinophils 7.20 (H) 0.00 - 6.90 %    Basophils 0.60 0.00 - 1.80 %    Immature Granulocytes 0.40 0.00 - 0.90 %    Nucleated RBC 0.00 /100 WBC    Neutrophils (Absolute) 4.44 2.00 - 7.15 K/uL    Lymphs (Absolute) 2.50 1.00 - 4.80 K/uL    Monos (Absolute) 0.80 0.00 - 0.85 K/uL    Eos (Absolute) 0.61 (H) 0.00 - 0.51 K/uL    Baso (Absolute) 0.05 0.00 - 0.12 K/uL    Immature Granulocytes (abs) 0.03 0.00 - 0.11 K/uL    NRBC (Absolute) 0.00 K/uL   Complete Metabolic Panel (CMP)   Result Value Ref Range    Sodium 135 135 - 145 mmol/L    Potassium 3.6 3.6 - 5.5 mmol/L    Chloride 103 96 - 112 mmol/L    Co2 26 20 - 33 mmol/L    Anion Gap 6.0 0.0 - 11.9    Calcium 8.4 8.4 - 10.2 mg/dL    Glucose 100 (H) 65 - 99 mg/dL    Bun 15 8 - 22 mg/dL    Creatinine 0.88 0.50 - 1.40 mg/dL    AST(SGOT) 28 12  - 45 U/L    ALT(SGPT) 18 2 - 50 U/L    Alkaline Phosphatase 106 (H) 30 - 99 U/L    Total Bilirubin 0.5 0.1 - 1.5 mg/dL    Albumin 3.7 3.2 - 4.9 g/dL    Total Protein 7.3 6.0 - 8.2 g/dL    Globulin 3.6 (H) 1.9 - 3.5 g/dL    A-G Ratio 1.0 g/dL   Btype Natriuretic Peptide   Result Value Ref Range    B Natriuretic Peptide 23 0 - 100 pg/mL   Troponin STAT   Result Value Ref Range    Troponin I <0.02 0.00 - 0.04 ng/mL   ESTIMATED GFR   Result Value Ref Range    GFR If African American >60 >60 mL/min/1.73 m 2    GFR If Non African American >60 >60 mL/min/1.73 m 2   Magnesium   Result Value Ref Range    Magnesium 2.0 1.5 - 2.5 mg/dL   EKG   Result Value Ref Range    Report       [facility]    Test Date:  2018  Pt Name:    ANGELA SHAIKH              Department: [facility]  MRN:        [id number]                      Room:  Gender:     Female                       Technician: [id number]  :        1945                   Requested By:ER TRIAGE PROTOCOL  Order #:    [id number]                    Reading MD: GRAY PAYTON MD    Measurements  Intervals                                Axis  Rate:       81                           P:          33  NV:         176                          QRS:        26  QRSD:       78                           T:          21  QT:         396  QTc:        460    Interpretive Statements  SINUS RHYTHM  Compared to ECG 2017 15:54:25  No significant changes    Electronically Signed On 2018 19:18:05 PST by GRAY PAYTON MD          RADIOLOGY/PROCEDURES  DX-CHEST-PORTABLE (1 VIEW)   Final Result      No acute cardiopulmonary findings.      EC-ECHOCARDIOGRAM COMPLETE W/ CONT    (Results Pending)         COURSE & MEDICAL DECISION MAKING  Pertinent Labs & Imaging studies reviewed. (See chart for details)  Patient with hypertensive urgency/emergency with headache, chest pain, shortness of breath.  The patient's blood pressures are extremely  elevated here with pressures over 200.  We will give the patient some labetalol, check cardiac markers, give the patient some of her home pain medicines,    Patient feeling improved, discussed the case with the hospitalist for admission the hospital.    FINAL IMPRESSION  1. Hypertensive urgency    2. Chest pain, unspecified type    3. SOB (shortness of breath)             This dictation was created using voice recognition software. The accuracy of the dictation is limited to the abilities of the software. I expect there may be some errors of grammar and possibly content. The nursing notes were reviewed and certain aspects of this information were incorporated into this note.    Electronically signed by: Quincy Caballero, 11/25/2018 7:32 PM

## 2018-11-26 NOTE — H&P
Hospital Medicine History and Physical    Date of Service  11/25/2018    Chief Complaint  Chief Complaint   Patient presents with   • Hypertension   • Shortness of Breath       History of Presenting Illness  73 y.o. female with a past medical history of HTN, Chronic back pain, depression presented 11/25/2018 to the ER for evaluation of headache, chest pain and shortness of breath times 1-2 days.  Patient was recently diagnosed with having sinusitis 2 days ago and was given oral Augmentin, although patient took 1 day worth of antibiotics she says she still had pain behind her eyes bilaterally, in addition to having intermittent chest pain described as pressure-like sensation lasting about 1-2 minutes intermittently throughout the day, associated with mild shortness of breath, although pain was nonradiating and was 3 out of 10 in severity.  As a result patient's daughter took her to the urgent care for which they referred to the ER for further evaluation.  Unfortunately ER patient was noted to have a blood pressure over 200, as a result patient was given IV medications to control her blood pressure.  With further discussion with the patient she normally does not check her blood pressure but only does so when she feels ill or with a headache.  At this point patient will be admitted for strict blood pressure control, an echocardiogram which will be ordered to evaluate her heart, as well as continued oral antibiotics for sinusitis.  Otherwise currently at this given time patient denies having any chest pain shortness of breath or nausea vomiting.                  Primary Care Physician  Sultana Browne M.D.    Consultants  None    Code Status  Code: Full code    Review of Systems  Review of Systems   Constitutional: Negative for chills, fever and malaise/fatigue.   HENT: Negative for congestion, hearing loss, sore throat and tinnitus.    Eyes: Negative for blurred vision, double vision, photophobia and pain.  "  Respiratory: Negative for cough, hemoptysis, sputum production, shortness of breath and stridor.    Cardiovascular: Positive for chest pain. Negative for palpitations, orthopnea, claudication and PND.   Gastrointestinal: Negative for blood in stool, constipation, heartburn, melena, nausea and vomiting.   Genitourinary: Negative for dysuria, frequency and urgency.   Musculoskeletal: Negative for back pain, myalgias and neck pain.   Neurological: Positive for headaches. Negative for dizziness, tingling, tremors, sensory change, speech change and weakness.   Psychiatric/Behavioral: Negative for depression, memory loss and suicidal ideas. The patient is not nervous/anxious.           Past Medical History  Past Medical History:   Diagnosis Date   • Cold 4-8-2017    cold; taking antibiotics  4-   • Sorethroat 2/09/2016   • Arthritis    • Asthma    • Carpal tunnel syndrome    • Depression    • DJD (degenerative joint disease)    • GERD (gastroesophageal reflux disease)    • GERD (gastroesophageal reflux disease)    • Heart burn    • Hiatus hernia syndrome    • Hypertension    • Obesity    • Obstructive sleep apnea    • Pain     back, knees   • Sleep apnea     CPAP   • Snoring    • Thrush     from \"Advair\", takes nystatin   • Urinary incontinence        Surgical History  Past Surgical History:   Procedure Laterality Date   • KNEE ARTHROPLASTY TOTAL Right 5/1/2017    Procedure: KNEE ARTHROPLASTY TOTAL;  Surgeon: Jesús Rutledge M.D.;  Location: [facility];  Service:    • KNEE ARTHROPLASTY TOTAL Left 3/7/2016    Procedure: KNEE ARTHROPLASTY TOTAL;  Surgeon: Jesús Rutledge M.D.;  Location: [facility];  Service:    • LUMBAR FUSION POSTERIOR  9/13/2013    Performed by Kaushal Ayala M.D. at [facility]   • LUMBAR LAMINECTOMY DISKECTOMY  9/13/2013    Performed by Kaushal Ayala M.D. at [facility]   • LUMBAR LAMINECTOMY DISKECTOMY  5/30/2013    Performed by Kaushal RHODES" LUCRECIA Ayala at SUR[facility]ERY [facility]   • CHOLECYSTECTOMY  2010    laparoscopic   • OTHER NEUROLOGICAL SURG  2/2009    I&D of brain from sinus infection   • FORAMINOTOMY  11/26/08    Performed by [facility]ALLWOR[facility] at [facility]ERY [facility]   • HARDWARE REMOVAL NEURO  11/26/08    Performed by [facility] STALLWORTH at [facility]ERY [facility]   • KNEE ARTHROSCOPY Right 2008   • LUMBAR FUSION POSTERIOR  2005   • CARPAL TUNNEL RELEASE Right 2000   • SHOULDER ARTHROTOMY Right 2000   • EYE SURGERY Bilateral     Cataract surgery 2/2018, 3/2018       Medications  No current facility-administered medications on file prior to encounter.      Current Outpatient Prescriptions on File Prior to Encounter   Medication Sig Dispense Refill   • fluticasone-salmeterol (ADVAIR DISKUS) 500-50 MCG/DOSE AEROSOL POWDER, BREATH ACTIVATED Inhale 1 Puff by mouth every 12 hours.     • fluticasone (FLONASE) 50 MCG/ACT nasal spray Spray 1 Spray in nose every day.     • amLODIPine (NORVASC) 5 MG Tab Take 5 mg by mouth every day.     • multivitamin (THERAGRAN) Tab Take 1 Tab by mouth every day.     • Multiple Vitamins-Minerals (HAIR SKIN AND NAILS FORMULA) Tab Take  by mouth.     • docusate sodium (COLACE) 100 MG Cap Take 100 mg by mouth 2 times a day.     • hydrocodone-acetaminophen (NORCO) 5-325 MG Tab per tablet Take 1-2 Tabs by mouth every 6 hours as needed.     • duloxetine (CYMBALTA) 60 MG Cap DR Particles delayed-release capsule Take 60 mg by mouth every day.     • nystatin/triamcinolone (MYCOLOG) 720297-0.1 UNIT/GM-% Cream Apply 1 Application to affected area(s) as needed (For vaginal).     • diphenhydrAMINE (BENADRYL) 50 MG Cap Take 50 mg by mouth every bedtime.     • gabapentin (NEURONTIN) 300 MG CAPS Take 1 Cap by mouth 3 times a day. 90 Cap 0   • albuterol (PROAIR HFA) 108 (90 BASE) MCG/ACT AERS Inhale 2 Puffs by mouth every 6 hours as needed for Shortness of Breath. Shortness of breath     • losartan (COZAAR) 100 MG TABS Take 100 mg by  mouth every day.     • nystatin (MYCOSTATIN) 215759 UNIT/ML SUSP Take 100,000 Units by mouth 5 Times a Day.     • montelukast (SINGULAIR) 10 MG TABS Take 10 mg by mouth every morning.     • trazodone (DESYREL) 50 MG TABS Take 50 mg by mouth every evening.     • omeprazole (PRILOSEC) 20 MG CPDR Take 20 mg by mouth 2 times a day.         Family History  Family History   Problem Relation Age of Onset   • Heart Disease Father    • Arthritis Father    • Lung Disease Father         asthma   • Psychiatry Father         depression   • Alcohol/Drug Father    • Depression Father    • Hypertension Mother    • Cancer Mother    • Heart Disease Mother    • Arthritis Mother    • Stroke Mother    • Hyperlipidemia Unknown    • Alcohol/Drug Brother    • Depression Daughter    • Drug abuse Son         in remission   • Bipolar disorder Grandchild    • Genetic Neg Hx    • Diabetes Neg Hx        Social History  Social History   Substance Use Topics   • Smoking status: Former Smoker     Packs/day: 1.00     Years: 6.00     Types: Cigarettes     Start date: 1972     Quit date: 1978   • Smokeless tobacco: Never Used      Comment:    • Alcohol use No       Allergies  Allergies   Allergen Reactions   • Allergy      Unknown antibiotic   • Cefepime Hives   • Cephalosporins Hives   • Clarithromycin Hives   • Cleocin [Clindamycin] Itching   • Meropenem Hives   • Morphine Itching   • Sulfamethoxazole-Trimethoprim Itching        Physical Exam  Laboratory   Hemodynamics  Temp (24hrs), Av.6 °C (97.8 °F), Min:36.4 °C (97.6 °F), Max:36.7 °C (98 °F)   Temperature: 36.4 °C (97.6 °F)  Pulse  Av  Min: 70  Max: 88 Heart Rate (Monitored): 72  Blood Pressure : (!) 175/82, NIBP: 130/64      Respiratory      Respiration: 20, Pulse Oximetry: 95 %             Physical Exam   Constitutional: She is oriented to person, place, and time. She appears well-developed and well-nourished. No distress.   obese   HENT:   Head: Normocephalic and  atraumatic.   Mouth/Throat: No oropharyngeal exudate.   Eyes: Pupils are equal, round, and reactive to light. Conjunctivae are normal. Right eye exhibits no discharge. No scleral icterus.   Neck: Neck supple. No JVD present. No thyromegaly present.   Cardiovascular: Normal rate and intact distal pulses.    No murmur heard.  Pulmonary/Chest: Effort normal and breath sounds normal. No stridor. No respiratory distress. She has no wheezes. She has no rales.   Abdominal: Soft. Bowel sounds are normal. She exhibits no distension. There is no tenderness. There is no rebound.   Musculoskeletal: Normal range of motion. She exhibits edema (trace B/L L/E Edema).   Neurological: She is alert and oriented to person, place, and time. No cranial nerve deficit.   Skin: Skin is warm. She is not diaphoretic. No erythema.   Psychiatric: She has a normal mood and affect. Her behavior is normal. Thought content normal.         Assessment/Plan  * Hypertensive urgency- (present on admission)   Assessment & Plan    SBP>200 on admission, with headache.   Patient usually does not check her blood pressure at home, as as result we are unsure what her normal baseline is  We have doubled her Norvasc dose to 10 mg  And you losartan 100 mg  IV as needed enalaprilat and labetalol as needed of SBP over 170  We will decide whether to add a third hypertensive agent  In the ER was 140s systolic.     HTN (hypertension)- (present on admission)   Assessment & Plan    Uncontrolled  Resume home dose of losartan 100 mg  Increased amlodipine dose to 10 mg     Subacute frontal sinusitis- (present on admission)   Assessment & Plan    Patient was recently diagnosed with sinusitis was given oral Augmentin, patient says she only took 1 day of this, hence we will continue this for additional 5 days     Chest pain   Assessment & Plan    EKG and initial troponins are negative  Suspect chest pain due to hypertensive urgency, patient has had a nuclear stress test last  year which was negative.  I will not repeat  Her chest pain is gone currently  Continue to trend serial troponins every 4 hours x3  Echocardiogram ordered and pending  Continue with aspirin and statin     MDD (major depressive disorder), recurrent, in full remission (HCC)- (present on admission)   Assessment & Plan    Well-controlled continue home dose of Cymbalta     Seasonal allergies- (present on admission)   Assessment & Plan    Continue home dose of montelukast     BEATRIZ (obstructive sleep apnea)- (present on admission)   Assessment & Plan    Recommend resuming her nightly CPAP, asked family to bring it in     Back pain- (present on admission)   Assessment & Plan    Chronic continue IV and p.o. pain medications as needed     GERD (gastroesophageal reflux disease)- (present on admission)   Assessment & Plan    Resume omeprazole          I anticipate this patient will require at least two midnights for appropriate medical management, necessitating inpatient admission.    Prophylaxis: lovenox    Recent Labs      11/25/18 1849   WBC  8.4   RBC  4.22   HEMOGLOBIN  13.0   HEMATOCRIT  38.7   MCV  91.7   MCH  30.8   MCHC  33.6   RDW  45.8   PLATELETCT  235   MPV  9.4     Recent Labs      11/25/18   1849   SODIUM  135   POTASSIUM  3.6   CHLORIDE  103   CO2  26   GLUCOSE  100*   BUN  15   CREATININE  0.88   CALCIUM  8.4     Recent Labs      11/25/18 1849   ALTSGPT  18   ASTSGOT  28   ALKPHOSPHAT  106*   TBILIRUBIN  0.5   GLUCOSE  100*         Recent Labs      11/25/18 1849   BNPBTYPENAT  23         Lab Results   Component Value Date    TROPONINI <0.02 11/25/2018       Imaging  DX-CHEST-PORTABLE (1 VIEW)   Final Result      No acute cardiopulmonary findings.      EC-ECHOCARDIOGRAM COMPLETE W/ CONT    (Results Pending)

## 2018-11-26 NOTE — ED TRIAGE NOTES
Pt is accompanied by friend.  She is referred to our facility from Urgent Care to address complaints of SOB at rest, and escalating BP since yesterday (chronic).

## 2018-11-26 NOTE — DISCHARGE INSTRUCTIONS
Discharge Instructions    Discharged to home by car with relative. Discharged via wheelchair, hospital escort: Yes.  Special equipment needed: Not Applicable    Be sure to schedule a follow-up appointment with your primary care doctor or any specialists as instructed.     Discharge Plan:   Diet Plan: Discussed  Activity Level: Discussed  Confirmed Follow up Appointment: Appointment Scheduled  Confirmed Symptoms Management: Discussed  Medication Reconciliation Updated: Yes  Influenza Vaccine Indication: Patient Refuses (11/11/2018)    I understand that a diet low in cholesterol, fat, and sodium is recommended for good health. Unless I have been given specific instructions below for another diet, I accept this instruction as my diet prescription.   Other diet:     Heart-Healthy Eating Plan  Introduction  Heart-healthy meal planning includes:  · Limiting unhealthy fats.  · Increasing healthy fats.  · Making other small dietary changes.  You may need to talk with your doctor or a diet specialist (dietitian) to create an eating plan that is right for you.  What types of fat should I choose?  · Choose healthy fats. These include olive oil and canola oil, flaxseeds, walnuts, almonds, and seeds.  · Eat more omega-3 fats. These include salmon, mackerel, sardines, tuna, flaxseed oil, and ground flaxseeds. Try to eat fish at least twice each week.  · Limit saturated fats.  ¨ Saturated fats are often found in animal products, such as meats, butter, and cream.  ¨ Plant sources of saturated fats include palm oil, palm kernel oil, and coconut oil.  · Avoid foods with partially hydrogenated oils in them. These include stick margarine, some tub margarines, cookies, crackers, and other baked goods. These contain trans fats.  What general guidelines do I need to follow?  · Check food labels carefully. Identify foods with trans fats or high amounts of saturated fat.  · Fill one half of your plate with vegetables and green salads. Eat  "4-5 servings of vegetables per day. A serving of vegetables is:  ¨ 1 cup of raw leafy vegetables.  ¨ ½ cup of raw or cooked cut-up vegetables.  ¨ ½ cup of vegetable juice.  · Fill one fourth of your plate with whole grains. Look for the word \"whole\" as the first word in the ingredient list.  · Fill one fourth of your plate with lean protein foods.  · Eat 4-5 servings of fruit per day. A serving of fruit is:  ¨ One medium whole fruit.  ¨ ¼ cup of dried fruit.  ¨ ½ cup of fresh, frozen, or canned fruit.  ¨ ½ cup of 100% fruit juice.  · Eat more foods that contain soluble fiber. These include apples, broccoli, carrots, beans, peas, and barley. Try to get 20-30 g of fiber per day.  · Eat more home-cooked food. Eat less restaurant, buffet, and fast food.  · Limit or avoid alcohol.  · Limit foods high in starch and sugar.  · Avoid fried foods.  · Avoid frying your food. Try baking, boiling, grilling, or broiling it instead. You can also reduce fat by:  ¨ Removing the skin from poultry.  ¨ Removing all visible fats from meats.  ¨ Skimming the fat off of stews, soups, and gravies before serving them.  ¨ Steaming vegetables in water or broth.  · Lose weight if you are overweight.  · Eat 4-5 servings of nuts, legumes, and seeds per week:  ¨ One serving of dried beans or legumes equals ½ cup after being cooked.  ¨ One serving of nuts equals 1½ ounces.  ¨ One serving of seeds equals ½ ounce or one tablespoon.  · You may need to keep track of how much salt or sodium you eat. This is especially true if you have high blood pressure. Talk with your doctor or dietitian to get more information.  What foods can I eat?  Grains   Breads, including [place], white, jarred, wheat, raisin, rye, oatmeal, and Italian. Tortillas that are neither fried nor made with lard or trans fat. Low-fat rolls, including hotdog and hamburger buns and English muffins. Biscuits. Muffins. Waffles. Pancakes. Light popcorn. Whole-grain cereals. Flatbread. Tiara " toast. Pretzels. Breadsticks. Rusks. Low-fat snacks. Low-fat crackers, including oyster, saltine, matzo, mini, animal, and rye. Rice and pasta, including brown rice and pastas that are made with whole wheat.  Vegetables   All vegetables.  Fruits   All fruits, but limit coconut.  Meats and Other Protein Sources   Lean, well-trimmed beef, veal, pork, and lamb. Chicken and turkey without skin. All fish and shellfish. Wild duck, rabbit, pheasant, and venison. Egg whites or low-cholesterol egg substitutes. Dried beans, peas, lentils, and tofu. Seeds and most nuts.  Dairy   Low-fat or nonfat cheeses, including ricotta, string, and mozzarella. Skim or 1% milk that is liquid, powdered, or evaporated. Buttermilk that is made with low-fat milk. Nonfat or low-fat yogurt.  Beverages   Mineral water. Diet carbonated beverages.  Sweets and Desserts   Sherbets and fruit ices. Honey, jam, marmalade, jelly, and syrups. Meringues and gelatins. Pure sugar candy, such as hard candy, jelly beans, gumdrops, mints, marshmallows, and small amounts of dark chocolate. Anant food cake.  Eat all sweets and desserts in moderation.  Fats and Oils   Nonhydrogenated (trans-free) margarines. Vegetable oils, including soybean, sesame, sunflower, olive, peanut, safflower, corn, canola, and cottonseed. Salad dressings or mayonnaise made with a vegetable oil. Limit added fats and oils that you use for cooking, baking, salads, and as spreads.  Other   Cocoa powder. Coffee and tea. All seasonings and condiments.  The items listed above may not be a complete list of recommended foods or beverages. Contact your dietitian for more options.   What foods are not recommended?  Grains   Breads that are made with saturated or trans fats, oils, or whole milk. Croissants. Butter rolls. Cheese breads. Sweet rolls. Donuts. Buttered popcorn. Chow mein noodles. High-fat crackers, such as cheese or butter crackers.  Meats and Other Protein Sources   Fatty meats, such  as hotdogs, short ribs, sausage, spareribs, trevino, rib eye roast or steak, and mutton. High-fat deli meats, such as salami and bologna. Caviar. Domestic duck and goose. Organ meats, such as kidney, liver, sweetbreads, and heart.  Dairy   Cream, sour cream, cream cheese, and creamed cottage cheese. Whole-milk cheeses, including blue (leonora), [place], Brie, Rickie, American, Havarti, Swiss, cheddar, Camembert, and [place]. Whole or 2% milk that is liquid, evaporated, or condensed. Whole buttermilk. Cream sauce or high-fat cheese sauce. Yogurt that is made from whole milk.  Beverages   Regular sodas and juice drinks with added sugar.  Sweets and Desserts   Frosting. Pudding. Cookies. Cakes other than raisa food cake. Candy that has milk chocolate or white chocolate, hydrogenated fat, butter, coconut, or unknown ingredients. Buttered syrups. Full-fat ice cream or ice cream drinks.  Fats and Oils   Gravy that has suet, meat fat, or shortening. Cocoa butter, hydrogenated oils, palm oil, coconut oil, palm kernel oil. These can often be found in baked products, candy, fried foods, nondairy creamers, and whipped toppings. Solid fats and shortenings, including trevino fat, salt pork, lard, and butter. Nondairy cream substitutes, such as coffee creamers and sour cream substitutes. Salad dressings that are made of unknown oils, cheese, or sour cream.  The items listed above may not be a complete list of foods and beverages to avoid. Contact your dietitian for more information.   This information is not intended to replace advice given to you by your health care provider. Make sure you discuss any questions you have with your health care provider.  Document Released: 06/18/2013 Document Revised: 05/25/2017 Document Reviewed: 06/11/2015  © 2017 Elsevier    2 Gram Low Sodium Diet  A 2 gram sodium diet restricts the amount of sodium in the diet to no more than 2 g or 2000 mg daily. Limiting the amount of sodium is often used to  "help lower blood pressure. It is important if you have heart, liver, or kidney problems. Many foods contain sodium for flavor and sometimes as a preservative. When the amount of sodium in a diet needs to be low, it is important to know what to look for when choosing foods and drinks. The following includes some information and guidelines to help make it easier for you to adapt to a low sodium diet.  QUICK TIPS  · Do not add salt to food.  · Avoid convenience items and fast food.  · Choose unsalted snack foods.  · Buy lower sodium products, often labeled as \"lower sodium\" or \"no salt added.\"  · Check food labels to learn how much sodium is in 1 serving.  · When eating at a restaurant, ask that your food be prepared with less salt or none, if possible.  READING FOOD LABELS FOR SODIUM INFORMATION  The nutrition facts label is a good place to find how much sodium is in foods. Look for products with no more than 500 to 600 mg of sodium per meal and no more than 150 mg per serving.  Remember that 2 g = 2000 mg.  The food label may also list foods as:  · Sodium-free: Less than 5 mg in a serving.  · Very low sodium: 35 mg or less in a serving.  · Low-sodium: 140 mg or less in a serving.  · Light in sodium: 50% less sodium in a serving. For example, if a food that usually has 300 mg of sodium is changed to become light in sodium, it will have 150 mg of sodium.  · Reduced sodium: 25% less sodium in a serving. For example, if a food that usually has 400 mg of sodium is changed to reduced sodium, it will have 300 mg of sodium.  CHOOSING FOODS  Grains  · Avoid: Salted crackers and snack items. Some cereals, including instant hot cereals. Bread stuffing and biscuit mixes. Seasoned rice or pasta mixes.  · Choose: Unsalted snack items. Low-sodium cereals, oats, puffed wheat and rice, shredded wheat. English muffins and bread. Pasta.  Meats  · Avoid: Salted, canned, smoked, spiced, pickled meats, including fish and poultry. Ricardo, " ham, sausage, cold cuts, hot dogs, anchovies.  · Choose: Low-sodium canned tuna and salmon. Fresh or frozen meat, poultry, and fish.  Dairy  · Avoid: Processed cheese and spreads. Cottage cheese. Buttermilk and condensed milk. Regular cheese.  · Choose: Milk. Low-sodium cottage cheese. Yogurt. Sour cream. Low-sodium cheese.  Fruits and Vegetables  · Avoid: Regular canned vegetables. Regular canned tomato sauce and paste. Frozen vegetables in sauces. Olives. Pickles. Relishes. Sauerkraut.  · Choose: Low-sodium canned vegetables. Low-sodium tomato sauce and paste. Frozen or fresh vegetables. Fresh and frozen fruit.  Condiments  · Avoid: Canned and packaged gravies. Worcestershire sauce. Tartar sauce. Barbecue sauce. Soy sauce. Steak sauce. Ketchup. Onion, garlic, and table salt. Meat flavorings and tenderizers.  · Choose: Fresh and dried herbs and spices. Low-sodium varieties of mustard and ketchup. Lemon juice. Tabasco sauce. Horseradish.  SAMPLE 2 GRAM SODIUM MEAL PLAN  Breakfast / Sodium (mg)  · 1 cup low-fat milk / 143 mg  · 2 slices whole-wheat toast / 270 mg  · 1 tbs heart-healthy margarine / 153 mg  · 1 hard-boiled egg / 139 mg  · 1 small orange / 0 mg  Lunch / Sodium (mg)  · 1 cup raw carrots / 76 mg  · ½ cup hummus / 298 mg  · 1 cup low-fat milk / 143 mg  · ½ cup red grapes / 2 mg  · 1 whole-wheat jarred bread / 356 mg  Dinner / Sodium (mg)  · 1 cup whole-wheat pasta / 2 mg  · 1 cup low-sodium tomato sauce / 73 mg  · 3 oz lean ground beef / 57 mg  · 1 small side salad (1 cup raw spinach leaves, ½ cup cucumber, ¼ cup yellow bell pepper) with 1 tsp olive oil and 1 tsp red wine vinegar / 25 mg  Snack / Sodium (mg)  · 1 container low-fat vanilla yogurt / 107 mg  · 3 mini cracker squares / 127 mg  Nutrient Analysis  · Calories: 2033  · Protein: 77 g  · Carbohydrate: 282 g  · Fat: 72 g  · Sodium: 1971 mg  Document Released: 12/18/2006 Document Revised: 03/11/2013 Document Reviewed: 03/21/2011  ExitCare® Patient  Information ©2014 [facility], [facility].      Special Instructions: None    · Is patient discharged on Warfarin / Coumadin?   No     Depression / Suicide Risk    As you are discharged from this [facility] facility, it is important to learn how to keep safe from harming yourself.    Recognize the warning signs:  · Abrupt changes in personality, positive or negative- including increase in energy   · Giving away possessions  · Change in eating patterns- significant weight changes-  positive or negative  · Change in sleeping patterns- unable to sleep or sleeping all the time   · Unwillingness or inability to communicate  · Depression  · Unusual sadness, discouragement and loneliness  · Talk of wanting to die  · Neglect of personal appearance   · Rebelliousness- reckless behavior  · Withdrawal from people/activities they love  · Confusion- inability to concentrate     If you or a loved one observes any of these behaviors or has concerns about self-harm, here's what you can do:  · Talk about it- your feelings and reasons for harming yourself  · Remove any means that you might use to hurt yourself (examples: pills, rope, extension cords, firearm)  · Get professional help from the community (Mental Health, Substance Abuse, psychological counseling)  · Do not be alone:Call your Safe Contact- someone whom you trust who will be there for you.  · Call your local CRISIS HOTLINE 428-7606 or 974-467-0141  · Call your local Children's Mobile Crisis Response Team [place] (128) 497-7940 or www.[employer]  · Call the toll free National Suicide Prevention Hotlines   · National Suicide Prevention Lifeline 933-884-BTND (7677)  · National Hope Line Network 800-SUICIDE (772-4925)

## 2018-11-26 NOTE — PROGRESS NOTES
Chief Complaint   Patient presents with   • Shortness of Breath     chest pain, SOB , hypertension yesterday and today        HISTORY OF PRESENT ILLNESS: Patient is a 73 y.o. female who presents today for about 48 hours of waxing and waning SOB, chest discomfort.   Here with daughters.   Yesterday they note patient was pale and complained of being SOB and having chest discomfort.   Less pronounced but persistent today than yesterday.  Patient reports that she did take an extra Amlodipine yesterday and that seemed to help bring her pressures down.  She has felt some dizziness and felt that her vision was fuzzy temporarily.   Pressures measured at highest at home were around 180/100.  Hx of asthma but has been compliant on medications and has not felt herself wheezing.       Patient Active Problem List    Diagnosis Date Noted   • Lumbosacral radiculopathy at S1 05/31/2013     Priority: High   • S/P laminectomy 05/30/2013     Priority: High   • Hyperglycemia 05/31/2013     Priority: Medium   • HTN (hypertension) 05/25/2013     Priority: Medium   • Hyponatremia 05/25/2013     Priority: Low   • GERD (gastroesophageal reflux disease) 05/25/2013     Priority: Low   • MDD (major depressive disorder), recurrent, in full remission (HCC) 08/27/2018   • Chronic insomnia 08/27/2018   • Elevated troponin 11/14/2017   • Hypertensive urgency 11/14/2017   • Moderate persistent asthma without complication 12/06/2016   • BEATRIZ (obstructive sleep apnea) 12/06/2016   • Seasonal allergies 12/06/2016   • Hypokalemia 03/10/2016   • H/O knee surgery 03/10/2016   • Pain and swelling of left knee 03/10/2016   • Primary osteoarthritis of left knee 03/07/2016   • Back pain 05/24/2013       Allergies:Allergy; Cefepime; Cephalosporins; Clarithromycin; Cleocin [clindamycin]; Meropenem; Morphine; and Sulfamethoxazole-trimethoprim    Current Outpatient Prescriptions Ordered in [facility]   Medication Sig Dispense Refill   • fluticasone-salmeterol (ADVAIR  DISKUS) 500-50 MCG/DOSE AEROSOL POWDER, BREATH ACTIVATED Inhale 1 Puff by mouth every 12 hours.     • fluticasone (FLONASE) 50 MCG/ACT nasal spray Spray 1 Spray in nose every day.     • amLODIPine (NORVASC) 5 MG Tab Take 5 mg by mouth every day.     • multivitamin (THERAGRAN) Tab Take 1 Tab by mouth every day.     • Multiple Vitamins-Minerals (HAIR SKIN AND NAILS FORMULA) Tab Take  by mouth.     • docusate sodium (COLACE) 100 MG Cap Take 100 mg by mouth 2 times a day.     • hydrocodone-acetaminophen (NORCO) 5-325 MG Tab per tablet Take 1-2 Tabs by mouth every 6 hours as needed.     • duloxetine (CYMBALTA) 60 MG Cap DR Particles delayed-release capsule Take 60 mg by mouth every day.     • nystatin/triamcinolone (MYCOLOG) 869653-9.1 UNIT/GM-% Cream Apply 1 Application to affected area(s) as needed (For vaginal).     • diphenhydrAMINE (BENADRYL) 50 MG Cap Take 50 mg by mouth every bedtime.     • gabapentin (NEURONTIN) 300 MG CAPS Take 1 Cap by mouth 3 times a day. 90 Cap 0   • albuterol (PROAIR HFA) 108 (90 BASE) MCG/ACT AERS Inhale 2 Puffs by mouth every 6 hours as needed for Shortness of Breath. Shortness of breath     • losartan (COZAAR) 100 MG TABS Take 100 mg by mouth every day.     • nystatin (MYCOSTATIN) 518025 UNIT/ML SUSP Take 100,000 Units by mouth 5 Times a Day.     • montelukast (SINGULAIR) 10 MG TABS Take 10 mg by mouth every morning.     • trazodone (DESYREL) 50 MG TABS Take 50 mg by mouth every evening.     • omeprazole (PRILOSEC) 20 MG CPDR Take 20 mg by mouth 2 times a day.       No current [facility]-ordered facility-administered medications on file.        Past Medical History:   Diagnosis Date   • Arthritis    • Asthma    • Carpal tunnel syndrome    • Cold 4-8-2017    cold; taking antibiotics  4-   • Depression    • DJD (degenerative joint disease)    • GERD (gastroesophageal reflux disease)    • GERD (gastroesophageal reflux disease)    • Heart burn    • Hiatus hernia syndrome    • Hypertension   "  • Obesity    • Obstructive sleep apnea    • Pain     back, knees   • Sleep apnea     CPAP   • Snoring    • Sorethroat 2016   • Thrush     from \"Advair\", takes nystatin   • Urinary incontinence        Social History   Substance Use Topics   • Smoking status: Former Smoker     Packs/day: 1.00     Years: 6.00     Types: Cigarettes     Start date: 1972     Quit date: 1978   • Smokeless tobacco: Never Used      Comment:    • Alcohol use No       Family Status   Relation Status   • Fa    • Mo    • Unknown (Not Specified)   • Bro (Not Specified)   • Aaron Alive   • Aaron Alive   • Son Alive   • GChild Alive   • Neg Hx (Not Specified)     Family History   Problem Relation Age of Onset   • Heart Disease Father    • Arthritis Father    • Lung Disease Father         asthma   • Psychiatry Father         depression   • Alcohol/Drug Father    • Depression Father    • Hypertension Mother    • Cancer Mother    • Heart Disease Mother    • Arthritis Mother    • Stroke Mother    • Hyperlipidemia Unknown    • Alcohol/Drug Brother    • Depression Daughter    • Drug abuse Son         in remission   • Bipolar disorder Grandchild    • Genetic Neg Hx    • Diabetes Neg Hx        ROS:  Review of Systems   Constitutional: Negative for fever, chills, weight loss and malaise/fatigue.   HENT: Negative for ear pain, nosebleeds, congestion, sore throat and neck pain.    Eyes: Negative for blurred vision.   Respiratory: SEE   Cardiovascular: SEE HPI  Gastrointestinal: Negative for heartburn, nausea, vomiting and abdominal pain.   Genitourinary: Negative for dysuria, urgency and frequency.     Exam:  Blood pressure 150/100, pulse 86, temperature 37.1 °C (98.8 °F), temperature source Temporal, resp. rate 16, height 1.575 m (5' 2\"), weight 114.3 kg (252 lb), SpO2 96 %, not currently breastfeeding.  General:  Well nourished, well developed female in NAD  Eyes: PERRLA, EOM within normal limits, no conjunctival injection, no " scleral icterus, visual fields and acuity grossly intact.  Ears: Normal shape and symmetry, no tenderness, no discharge. External canals are without any significant edema or erythema. Tympanic membranes are without any inflammation, no effusion. Gross auditory acuity is intact  Nose: Symmetrical, sinuses without tenderness, no discharge.   Mouth: reasonable hygiene, no erythema exudates or tonsillar enlargement.  Neck: no masses, range of motion within normal limits, no tracheal deviation. No lymphadenopathy  Pulmonary: Normal respiratory effort, no wheezes, crackles, or rhonchi.  Cardiovascular: regular rate and rhythm without murmurs, rubs, or gallops.  Skin: No visible rashes or lesion. Warm, pink, dry.   Extremities: no clubbing, cyanosis, or edema.  Neuro: A&O x 3. Speech normal/clear.  Normal gait.     EKG Interpretation   Interpreted by me   Rhythm: normal sinus   Rate: normal   Axis: normal   Ectopy: none   Conduction: normal   ST Segments: no acute change   T Waves: no acute change   Q Waves: none   Clinical Impression: no acute changes.  No changes compared to 11/2017        Assessment/Plan:  1. Essential hypertension     2. SOB (shortness of breath)  EKG   3. Chest pain, unspecified type  EKG         -EKG without acute changes however with presenting complaint of elevated blood pressure, chest discomfort and SOB on and off and feeling lightheaded patient is recommend to immediately proceed to ED.  Patient did decline ambulance and has two family members here who also agree they would prefer to take her POV.  They will proceed there from here.          Esther Patterson P.A.-C.

## 2018-11-27 ENCOUNTER — PATIENT OUTREACH (OUTPATIENT)
Dept: HEALTH INFORMATION MANAGEMENT | Facility: OTHER | Age: 73
End: 2018-11-27

## 2018-11-27 NOTE — DISCHARGE SUMMARY
Discharge Summary    CHIEF COMPLAINT ON ADMISSION  Chief Complaint   Patient presents with   • Hypertension   • Shortness of Breath       Reason for Admission  Sent by , High BP     Admission Date  11/25/2018    CODE STATUS  Prior    HPI & HOSPITAL COURSE  This is a 73 y.o. female here with headache, chest pain, hypertensive urgency.  Patient has been compliant with her home medications, she presented however with blood pressure greater than 200 to urgent care and was sent here.  I reviewed her recent history in addition to holiday food she apparently ate quite a few Cheetos-I discussed salt sensitivity and the effect on blood pressure, I discussed the drawbacks of HCTZ-it may be useful for her to take this on an as-needed basis in case she gets into these situations but I would discourage her from eating as much solid as she wants in thinking she can rely on this to control her pressure.  Her potassium is a little low and we will replace this over the next few days orally at home.  Her chest pain and headache have resolved and her blood pressure is now well controlled.  She will follow-up with primary care.  Family at bedside during discussion of diet and medications.    Echocardiogram was obtained and was essentially normal there was no evidence of LVH, EKG similarly did not show anything in this regard.     Therefore, she is discharged in good and stable condition to home with close outpatient follow-up.        Discharge Date  11/26/2018    FOLLOW UP ITEMS POST DISCHARGE  Primary CARE    DISCHARGE DIAGNOSES  Principal Problem:    Hypertensive urgency POA: Yes  Active Problems:    HTN (hypertension) POA: Yes    GERD (gastroesophageal reflux disease) POA: Yes    Back pain POA: Yes    BEATRIZ (obstructive sleep apnea) POA: Yes      Overview: AHI 12.4, minimum saturation 84%, on CPAP 16 cm.    Seasonal allergies POA: Yes    MDD (major depressive disorder), recurrent, in full remission (HCC) POA: Yes    Chest pain POA:  Unknown    Subacute frontal sinusitis POA: Yes  Resolved Problems:    * No resolved hospital problems. *      FOLLOW UP  Future Appointments  Date Time Provider Department Center   11/7/2019 10:40 AM JACK [facility] None     Sultana Browne M.D.  [address] [facility] [address]  [phone]    Go on 12/4/2018  PLEASE ARRIVE AT 2:15PM FOR YOUR 2:30PM APPOINTMENT. THANK YOU      MEDICATIONS ON DISCHARGE     Medication List      START taking these medications      Instructions   hydroCHLOROthiazide 25 MG Tabs  Commonly known as:  HYDRODIURIL   Take 1 daily for 3 days- Then 1 daily as needed     potassium chloride SA 20 MEQ Tbcr  Commonly known as:  Kdur   Take 1 Tab by mouth 2 Times a Day.  Dose:  20 mEq        CONTINUE taking these medications      Instructions   ADVAIR DISKUS 500-50 MCG/DOSE Aepb  Generic drug:  fluticasone-salmeterol   Inhale 1 Puff by mouth every 12 hours.  Dose:  1 Puff     amLODIPine 5 MG Tabs  Commonly known as:  NORVASC   Take 10 mg by mouth every day.  Dose:  10 mg     amoxicillin-clavulanate 875-125 MG Tabs  Commonly known as:  AUGMENTIN   Take 1 Tab by mouth 2 Times a Day. For a 10 day course  Dose:  1 Tab     diphenhydrAMINE 50 MG Caps  Commonly known as:  BENADRYL   Take 50 mg by mouth every bedtime.  Dose:  50 mg     docusate sodium 100 MG Caps  Commonly known as:  COLACE   Take 100 mg by mouth 2 times a day.  Dose:  100 mg     DULoxetine 60 MG Cpep delayed-release capsule  Commonly known as:  CYMBALTA   Take 60 mg by mouth every day.  Dose:  60 mg     fluticasone 50 MCG/ACT nasal spray  Commonly known as:  FLONASE   Spray 1 Spray in nose every day.  Dose:  1 Spray     gabapentin 300 MG Caps  Commonly known as:  NEURONTIN   Take 300 mg by mouth 4 times a day.  Dose:  300 mg     HAIR SKIN AND NAILS FORMULA Tabs   Take 1 Tab by mouth every day.  Dose:  1 Tab     HYDROcodone-acetaminophen 5-325 MG Tabs per tablet  Commonly known as:  NORCO   Take 1-2 Tabs by mouth  every 6 hours as needed.  Dose:  1-2 Tab     losartan 100 MG Tabs  Commonly known as:  COZAAR   Take 100 mg by mouth every day.  Dose:  100 mg     montelukast 10 MG Tabs  Commonly known as:  SINGULAIR   Take 10 mg by mouth every morning.  Dose:  10 mg     multivitamin Tabs   Take 1 Tab by mouth every day.  Dose:  1 Tab     nystatin 286369 UNIT/ML Susp  Commonly known as:  MYCOSTATIN   Take 100,000 Units by mouth 5 Times a Day.  Dose:  399185 Units     omeprazole 20 MG delayed-release capsule  Commonly known as:  PRILOSEC   Take 20 mg by mouth 2 times a day.  Dose:  20 mg     PROAIR  (90 Base) MCG/ACT Aers inhalation aerosol  Generic drug:  albuterol   Inhale 2 Puffs by mouth every 6 hours as needed for Shortness of Breath. Shortness of breath  Dose:  2 Puff     traZODone 50 MG Tabs  Commonly known as:  DESYREL   Take 50 mg by mouth every evening.  Dose:  50 mg            Allergies  Allergies   Allergen Reactions   • Allergy      Unknown antibiotic   • Cefepime Hives   • Cephalosporins Hives   • Clarithromycin Hives   • Cleocin [Clindamycin] Itching   • Meropenem Hives   • Morphine Itching   • Sulfamethoxazole-Trimethoprim Itching       DIET  Low-sodium, salt    ACTIVITY  As tolerated    CONSULTATIONS  None    PROCEDURES  None    LABORATORY  Lab Results   Component Value Date    SODIUM 136 11/26/2018    POTASSIUM 3.3 (L) 11/26/2018    CHLORIDE 105 11/26/2018    CO2 26 11/26/2018    GLUCOSE 111 (H) 11/26/2018    BUN 14 11/26/2018    CREATININE 0.91 11/26/2018    CREATININE 0.8 02/09/2009        Lab Results   Component Value Date    WBC 7.3 11/26/2018    HEMOGLOBIN 11.8 (L) 11/26/2018    HEMATOCRIT 35.7 (L) 11/26/2018    PLATELETCT 216 11/26/2018

## 2018-12-20 ENCOUNTER — HOSPITAL ENCOUNTER (OUTPATIENT)
Dept: RADIOLOGY | Facility: MEDICAL CENTER | Age: 73
End: 2018-12-20
Attending: FAMILY MEDICINE
Payer: MEDICARE

## 2018-12-20 DIAGNOSIS — T78.49XA: ICD-10-CM

## 2018-12-20 PROCEDURE — 71046 X-RAY EXAM CHEST 2 VIEWS: CPT

## 2019-01-05 ENCOUNTER — APPOINTMENT (OUTPATIENT)
Dept: RADIOLOGY | Facility: MEDICAL CENTER | Age: 74
End: 2019-01-05
Attending: EMERGENCY MEDICINE
Payer: MEDICARE

## 2019-01-05 ENCOUNTER — HOSPITAL ENCOUNTER (EMERGENCY)
Facility: MEDICAL CENTER | Age: 74
End: 2019-01-05
Attending: EMERGENCY MEDICINE
Payer: MEDICARE

## 2019-01-05 VITALS
HEART RATE: 80 BPM | RESPIRATION RATE: 18 BRPM | HEIGHT: 62 IN | WEIGHT: 264.55 LBS | BODY MASS INDEX: 48.68 KG/M2 | OXYGEN SATURATION: 98 % | TEMPERATURE: 98.6 F | SYSTOLIC BLOOD PRESSURE: 149 MMHG | DIASTOLIC BLOOD PRESSURE: 82 MMHG

## 2019-01-05 DIAGNOSIS — R06.02 SHORTNESS OF BREATH: ICD-10-CM

## 2019-01-05 LAB
ALBUMIN SERPL BCP-MCNC: 3.6 G/DL (ref 3.2–4.9)
ALBUMIN/GLOB SERPL: 1.2 G/DL
ALP SERPL-CCNC: 91 U/L (ref 30–99)
ALT SERPL-CCNC: 19 U/L (ref 2–50)
ANION GAP SERPL CALC-SCNC: 7 MMOL/L (ref 0–11.9)
AST SERPL-CCNC: 24 U/L (ref 12–45)
BASOPHILS # BLD AUTO: 0.6 % (ref 0–1.8)
BASOPHILS # BLD: 0.05 K/UL (ref 0–0.12)
BILIRUB SERPL-MCNC: 0.6 MG/DL (ref 0.1–1.5)
BNP SERPL-MCNC: 15 PG/ML (ref 0–100)
BUN SERPL-MCNC: 14 MG/DL (ref 8–22)
CALCIUM SERPL-MCNC: 8.4 MG/DL (ref 8.4–10.2)
CHLORIDE SERPL-SCNC: 103 MMOL/L (ref 96–112)
CO2 SERPL-SCNC: 24 MMOL/L (ref 20–33)
CREAT SERPL-MCNC: 0.9 MG/DL (ref 0.5–1.4)
D DIMER PPP IA.FEU-MCNC: 0.58 UG/ML (FEU) (ref 0–0.5)
EKG IMPRESSION: NORMAL
EOSINOPHIL # BLD AUTO: 0.47 K/UL (ref 0–0.51)
EOSINOPHIL NFR BLD: 5.7 % (ref 0–6.9)
ERYTHROCYTE [DISTWIDTH] IN BLOOD BY AUTOMATED COUNT: 47.1 FL (ref 35.9–50)
GLOBULIN SER CALC-MCNC: 3 G/DL (ref 1.9–3.5)
GLUCOSE SERPL-MCNC: 105 MG/DL (ref 65–99)
HCT VFR BLD AUTO: 37.8 % (ref 37–47)
HGB BLD-MCNC: 12.5 G/DL (ref 12–16)
IMM GRANULOCYTES # BLD AUTO: 0.02 K/UL (ref 0–0.11)
IMM GRANULOCYTES NFR BLD AUTO: 0.2 % (ref 0–0.9)
LYMPHOCYTES # BLD AUTO: 1.72 K/UL (ref 1–4.8)
LYMPHOCYTES NFR BLD: 21 % (ref 22–41)
MCH RBC QN AUTO: 29.8 PG (ref 27–33)
MCHC RBC AUTO-ENTMCNC: 33.1 G/DL (ref 33.6–35)
MCV RBC AUTO: 90.2 FL (ref 81.4–97.8)
MONOCYTES # BLD AUTO: 0.87 K/UL (ref 0–0.85)
MONOCYTES NFR BLD AUTO: 10.6 % (ref 0–13.4)
NEUTROPHILS # BLD AUTO: 5.06 K/UL (ref 2–7.15)
NEUTROPHILS NFR BLD: 61.9 % (ref 44–72)
NRBC # BLD AUTO: 0 K/UL
NRBC BLD-RTO: 0 /100 WBC
PLATELET # BLD AUTO: 226 K/UL (ref 164–446)
PMV BLD AUTO: 8.9 FL (ref 9–12.9)
POTASSIUM SERPL-SCNC: 3.6 MMOL/L (ref 3.6–5.5)
PROT SERPL-MCNC: 6.6 G/DL (ref 6–8.2)
RBC # BLD AUTO: 4.19 M/UL (ref 4.2–5.4)
SODIUM SERPL-SCNC: 134 MMOL/L (ref 135–145)
TROPONIN I SERPL-MCNC: <0.02 NG/ML (ref 0–0.04)
WBC # BLD AUTO: 8.2 K/UL (ref 4.8–10.8)

## 2019-01-05 PROCEDURE — 83880 ASSAY OF NATRIURETIC PEPTIDE: CPT

## 2019-01-05 PROCEDURE — 85025 COMPLETE CBC W/AUTO DIFF WBC: CPT

## 2019-01-05 PROCEDURE — 700117 HCHG RX CONTRAST REV CODE 255: Performed by: EMERGENCY MEDICINE

## 2019-01-05 PROCEDURE — 36415 COLL VENOUS BLD VENIPUNCTURE: CPT

## 2019-01-05 PROCEDURE — 93005 ELECTROCARDIOGRAM TRACING: CPT | Performed by: EMERGENCY MEDICINE

## 2019-01-05 PROCEDURE — 71275 CT ANGIOGRAPHY CHEST: CPT

## 2019-01-05 PROCEDURE — 71045 X-RAY EXAM CHEST 1 VIEW: CPT

## 2019-01-05 PROCEDURE — 85379 FIBRIN DEGRADATION QUANT: CPT

## 2019-01-05 PROCEDURE — 99284 EMERGENCY DEPT VISIT MOD MDM: CPT

## 2019-01-05 PROCEDURE — 80053 COMPREHEN METABOLIC PANEL: CPT

## 2019-01-05 PROCEDURE — 84484 ASSAY OF TROPONIN QUANT: CPT

## 2019-01-05 RX ORDER — IPRATROPIUM BROMIDE AND ALBUTEROL SULFATE 2.5; .5 MG/3ML; MG/3ML
3 SOLUTION RESPIRATORY (INHALATION) 4 TIMES DAILY
COMMUNITY

## 2019-01-05 RX ADMIN — IOHEXOL 55 ML: 350 INJECTION, SOLUTION INTRAVENOUS at 14:45

## 2019-01-05 ASSESSMENT — PAIN SCALES - GENERAL: PAINLEVEL_OUTOF10: 0

## 2019-01-05 NOTE — ED NOTES
Med rec completed per pt  Allergies reviewed    Pt recently completed a course of Steroids and Antibiotics

## 2019-01-05 NOTE — ED PROVIDER NOTES
"ED Provider Note    CHIEF COMPLAINT  Chief Complaint   Patient presents with   • Difficulty Breathing       HPI  Cyndy Petit is a 73 y.o. female who presents for evaluation of difficulty breathing shortness of breath.  The patient has an extensive medical history as listed below.  She has a distant history of smoking but has not smoked for over 30 or 40 years.  She had some wheezing and cough earlier this month and her PCP placed her on a 5-10-day course of steroids as well as antibiotics.  That finished a few days ago.  She still reports breathlessness, mild dyspnea on exertion but specifically no chest pain.  No hemoptysis or leg swelling.  Her PCP was worried about a pulmonary embolism and apparently is ordered an outpatient CT scan but has not been performed.  She specifically denies high fever or productive cough    REVIEW OF SYSTEMS  See HPI for further details.  Positive for shortness of breath breathlessness no chest pain all other systems are negative.     PAST MEDICAL HISTORY  Past Medical History:   Diagnosis Date   • Cold 4-8-2017    cold; taking antibiotics  4-   • Sorethroat 2/09/2016   • Arthritis    • Asthma    • Carpal tunnel syndrome    • Depression    • DJD (degenerative joint disease)    • GERD (gastroesophageal reflux disease)    • GERD (gastroesophageal reflux disease)    • Heart burn    • Hiatus hernia syndrome    • Hypertension    • Obesity    • Obstructive sleep apnea    • Pain     back, knees   • Sleep apnea     CPAP   • Snoring    • Thrush     from \"Advair\", takes nystatin   • Urinary incontinence        FAMILY HISTORY  Noncontributory    SOCIAL HISTORY  Social History     Social History   • Marital status:      Spouse name: N/A   • Number of children: N/A   • Years of education: N/A     Social History Main Topics   • Smoking status: Former Smoker     Packs/day: 1.00     Years: 6.00     Types: Cigarettes     Start date: 1/1/1972     Quit date: 1/1/1978   • Smokeless " tobacco: Never Used      Comment: 1978   • Alcohol use No   • Drug use: No   • Sexual activity: Not on file     Other Topics Concern   • Not on file     Social History Narrative    ** Merged History Encounter **          Distant smoking history  SURGICAL HISTORY  Past Surgical History:   Procedure Laterality Date   • KNEE ARTHROPLASTY TOTAL Right 5/1/2017    Procedure: KNEE ARTHROPLASTY TOTAL;  Surgeon: Jesús Rutledge M.D.;  Location: SURGERY [facility];  Service:    • KNEE ARTHROPLASTY TOTAL Left 3/7/2016    Procedure: KNEE ARTHROPLASTY TOTAL;  Surgeon: Jesús Rutledge M.D.;  Location: SURGERY [facility];  Service:    • LUMBAR FUSION POSTERIOR  9/13/2013    Performed by Kaushal Ayala M.D. at SURGERY [facility]   • LUMBAR LAMINECTOMY DISKECTOMY  9/13/2013    Performed by Kaushal Ayala M.D. at SURGERY [facility]   • LUMBAR LAMINECTOMY DISKECTOMY  5/30/2013    Performed by Kaushal Ayala M.D. at SURGERY [facility]   • CHOLECYSTECTOMY  2010    laparoscopic   • OTHER NEUROLOGICAL SURG  2/2009    I&D of brain from sinus infection   • FORAMINOTOMY  11/26/08    Performed by [facility]WOR[facility] at SURGERY [facility]   • HARDWARE REMOVAL NEURO  11/26/08    Performed by [facility] at SURGERY [facility]   • KNEE ARTHROSCOPY Right 2008   • LUMBAR FUSION POSTERIOR  2005   • CARPAL TUNNEL RELEASE Right 2000   • SHOULDER ARTHROTOMY Right 2000   • EYE SURGERY Bilateral     Cataract surgery 2/2018, 3/2018       CURRENT MEDICATIONS  No current facility-administered medications for this encounter.     Current Outpatient Prescriptions:   •  ipratropium-albuterol (DUONEB) 0.5-2.5 (3) MG/3ML nebulizer solution, 3 mL by Nebulization route 4 times a day., Disp: , Rfl:   •  gabapentin (NEURONTIN) 300 MG Cap, Take 300 mg by mouth 4 times a day., Disp: , Rfl:   •  potassium chloride SA (KDUR) 20 MEQ Tab CR, Take 1 Tab by mouth 2 Times a Day., Disp: 6 Tab, Rfl: 0  •  hydroCHLOROthiazide  (HYDRODIURIL) 25 MG Tab, Take 1 daily for 3 days- Then 1 daily as needed, Disp: 30 Tab, Rfl: 1  •  fluticasone-salmeterol (ADVAIR DISKUS) 500-50 MCG/DOSE AEROSOL POWDER, BREATH ACTIVATED, Inhale 1 Puff by mouth every 12 hours., Disp: , Rfl:   •  fluticasone (FLONASE) 50 MCG/ACT nasal spray, Spray 1 Spray in nose every day., Disp: , Rfl:   •  amLODIPine (NORVASC) 5 MG Tab, Take 10 mg by mouth every day., Disp: , Rfl:   •  multivitamin (THERAGRAN) Tab, Take 1 Tab by mouth every day., Disp: , Rfl:   •  Multiple Vitamins-Minerals (HAIR SKIN AND NAILS FORMULA) Tab, Take 1 Tab by mouth every day., Disp: , Rfl:   •  docusate sodium (COLACE) 100 MG Cap, Take 100 mg by mouth 2 times a day., Disp: , Rfl:   •  hydrocodone-acetaminophen (NORCO) 5-325 MG Tab per tablet, Take 1-2 Tabs by mouth every 6 hours as needed., Disp: , Rfl:   •  duloxetine (CYMBALTA) 60 MG Cap DR Particles delayed-release capsule, Take 60 mg by mouth every day., Disp: , Rfl:   •  diphenhydrAMINE (BENADRYL) 50 MG Cap, Take 50 mg by mouth every bedtime., Disp: , Rfl:   •  albuterol (PROAIR HFA) 108 (90 BASE) MCG/ACT AERS, Inhale 2 Puffs by mouth every 6 hours as needed for Shortness of Breath. Shortness of breath, Disp: , Rfl:   •  losartan (COZAAR) 100 MG TABS, Take 100 mg by mouth every day., Disp: , Rfl:   •  nystatin (MYCOSTATIN) 614227 UNIT/ML SUSP, Take 100,000 Units by mouth 5 Times a Day., Disp: , Rfl:   •  montelukast (SINGULAIR) 10 MG TABS, Take 10 mg by mouth every morning., Disp: , Rfl:   •  trazodone (DESYREL) 50 MG TABS, Take 50 mg by mouth every evening., Disp: , Rfl:   •  omeprazole (PRILOSEC) 20 MG CPDR, Take 20 mg by mouth 2 times a day., Disp: , Rfl:       ALLERGIES  Allergies   Allergen Reactions   • Allergy      Unknown antibiotic   • Cefepime Hives   • Cephalosporins Hives   • Clarithromycin Hives   • Cleocin [Clindamycin] Itching   • Meropenem Hives   • Morphine Itching   • Sulfamethoxazole-Trimethoprim Itching       PHYSICAL  "EXAM  VITAL SIGNS: /86   Pulse 86   Temp 37.1 °C (98.7 °F) (Temporal)   Resp 20   Ht 1.575 m (5' 2\")   Wt 120 kg (264 lb 8.8 oz)   SpO2 96%   BMI 48.39 kg/m²       Constitutional: Well developed, Well nourished, No acute distress, Non-toxic appearance.   HENT: Normocephalic, Atraumatic, Bilateral external ears normal, Oropharynx moist, No oral exudates, Nose normal.   Eyes: PERRLA, EOMI, Conjunctiva normal, No discharge.   Neck: Normal range of motion, No tenderness, Supple, No stridor.   Cardiovascular: Normal heart rate, Normal rhythm, No murmurs, No rubs, No gallops.   Thorax & Lungs: Scattered rhonchi no rales, No wheezing, No chest tenderness.   Abdomen: Bowel sounds normal, Soft, No tenderness, No masses, No pulsatile masses.   Skin: Warm, Dry, No erythema, No rash.   Back: No tenderness, No CVA tenderness.   Extremities: Intact distal pulses, No edema, No tenderness, No cyanosis, No clubbing.   Musculoskeletal: Good range of motion in all major joints. No tenderness to palpation or major deformities noted.   Neurologic: Alert & oriented x 3, Normal motor function, Normal sensory function, No focal deficits noted.   Psychiatric:  anxious    EKG  Interpretation by me rate 85 sinus rhythm.  No acute ST segment elevation or depression no pathological T wave inversion.  R wave progression is early no acute evidence of ischemia or arrhythmia    RADIOLOGY/PROCEDURES  CT-CTA CHEST PULMONARY ARTERY W/ RECONS   Final Result      1.  There is no CT evidence of acute pulmonary embolism.   2.  There is no evidence of pneumonia or pneumothorax.   3.  There is no pleural effusion.      Low Risk: No routine follow-up      High Risk: Optional CT at 12 months      Comments: Use most suspicious nodule as guide to management. Follow-up intervals may vary according to size and risk.      Low Risk - Minimal or absent history of smoking and of other known risk factors.      High Risk - History of smoking or of other " known risk factors.      Note: These recommendations do not apply to lung cancer screening, patients with immunosuppression, or patients with known primary cancer.      Fleischner Society 2017 Guidelines for Management of Incidentally Detected Pulmonary Nodules in Adults      DX-CHEST-PORTABLE (1 VIEW)   Final Result      1.  There is no acute cardiopulmonary process.        Results for orders placed or performed during the hospital encounter of 01/05/19   CBC WITH DIFFERENTIAL   Result Value Ref Range    WBC 8.2 4.8 - 10.8 K/uL    RBC 4.19 (L) 4.20 - 5.40 M/uL    Hemoglobin 12.5 12.0 - 16.0 g/dL    Hematocrit 37.8 37.0 - 47.0 %    MCV 90.2 81.4 - 97.8 fL    MCH 29.8 27.0 - 33.0 pg    MCHC 33.1 (L) 33.6 - 35.0 g/dL    RDW 47.1 35.9 - 50.0 fL    Platelet Count 226 164 - 446 K/uL    MPV 8.9 (L) 9.0 - 12.9 fL    Neutrophils-Polys 61.90 44.00 - 72.00 %    Lymphocytes 21.00 (L) 22.00 - 41.00 %    Monocytes 10.60 0.00 - 13.40 %    Eosinophils 5.70 0.00 - 6.90 %    Basophils 0.60 0.00 - 1.80 %    Immature Granulocytes 0.20 0.00 - 0.90 %    Nucleated RBC 0.00 /100 WBC    Neutrophils (Absolute) 5.06 2.00 - 7.15 K/uL    Lymphs (Absolute) 1.72 1.00 - 4.80 K/uL    Monos (Absolute) 0.87 (H) 0.00 - 0.85 K/uL    Eos (Absolute) 0.47 0.00 - 0.51 K/uL    Baso (Absolute) 0.05 0.00 - 0.12 K/uL    Immature Granulocytes (abs) 0.02 0.00 - 0.11 K/uL    NRBC (Absolute) 0.00 K/uL   COMP METABOLIC PANEL   Result Value Ref Range    Sodium 134 (L) 135 - 145 mmol/L    Potassium 3.6 3.6 - 5.5 mmol/L    Chloride 103 96 - 112 mmol/L    Co2 24 20 - 33 mmol/L    Anion Gap 7.0 0.0 - 11.9    Glucose 105 (H) 65 - 99 mg/dL    Bun 14 8 - 22 mg/dL    Creatinine 0.90 0.50 - 1.40 mg/dL    Calcium 8.4 8.4 - 10.2 mg/dL    AST(SGOT) 24 12 - 45 U/L    ALT(SGPT) 19 2 - 50 U/L    Alkaline Phosphatase 91 30 - 99 U/L    Total Bilirubin 0.6 0.1 - 1.5 mg/dL    Albumin 3.6 3.2 - 4.9 g/dL    Total Protein 6.6 6.0 - 8.2 g/dL    Globulin 3.0 1.9 - 3.5 g/dL    A-G Ratio  1.2 g/dL   TROPONIN   Result Value Ref Range    Troponin I <0.02 0.00 - 0.04 ng/mL   BTYPE NATRIURETIC PEPTIDE   Result Value Ref Range    B Natriuretic Peptide 15 0 - 100 pg/mL   D-DIMER   Result Value Ref Range    D-Dimer Screen 0.58 (H) 0.00 - 0.50 ug/mL (FEU)   ESTIMATED GFR   Result Value Ref Range    GFR If African American >60 >60 mL/min/1.73 m 2    GFR If Non African American >60 >60 mL/min/1.73 m 2   EKG   Result Value Ref Range    Report       [facility] Emergency Dept.    Test Date:  2019  Pt Name:    ANGELA SHAIKH              Department: [facility]  MRN:        [id number]                      Room:       -ROOM 7  Gender:     Female                       Technician:   :        [id number]                   Requested By:DE LOFTON  Order #:    [id number]                    Reading MD:    Measurements  Intervals                                Axis  Rate:       85                           P:          35  NM:         168                          QRS:        16  QRSD:       87                           T:          12  QT:         374  QTc:        445    Interpretive Statements  Sinus rhythm  Low voltage, precordial leads  Abnormal R-wave progression, early transition  Compared to ECG 2018 01:06:46  Low QRS voltage now present          COURSE & MEDICAL DECISION MAKING  Pertinent Labs & Imaging studies reviewed. (See chart for details)  I reviewed the patient's records from previous visits.  Here her main complaint is mild shortness of breath.  She does not have high fever tachycardia hypoxia.  There is no evidence of focal infiltrate on chest x-ray.  CT scan chest was performed due to elevated d-dimer and the fact that her PCP had ordered an outpatient CT scan.  There is no suggestion of pulmonary embolism cardiomegaly, pleural effusion or any other infectious or obstructive process.  Patient has reassuring workup here with normal EKG normal BNP and troponin.  I suspect  she has some smoldering bronchitis.  Without any high fever leukocytosis I do not feel that additional antibiotics are indicated.  She already finished a course of antibiotics and steroids just recently.  I counseled her that she may ultimately need an echocardiogram to complete her workup but that does not need to be performed today.    FINAL IMPRESSION  1.  Shortness of breath  2.  Chronic bronchitis         Electronically signed by: Iglesia Figueroa, 1/5/2019 1:26 PM

## 2019-01-05 NOTE — ED TRIAGE NOTES
Pt presents complaining of dyspnea at rest.  Her medical history is significant for the following:  Lumbosacral radiculopathy at S1 05/31/2013        Priority: High   • S/P laminectomy 05/30/2013       Priority: High   • Hyperglycemia 05/31/2013       Priority: Medium   • HTN (hypertension) 05/25/2013       Priority: Medium   • Hyponatremia 05/25/2013       Priority: Low   • GERD (gastroesophageal reflux disease) 05/25/2013       Priority: Low   • MDD (major depressive disorder), recurrent, in full remission (HCC) 08/27/2018   • Chronic insomnia 08/27/2018   • Elevated troponin 11/14/2017   • Hypertensive urgency 11/14/2017   • Moderate persistent asthma without complication 12/06/2016   • EBATRIZ (obstructive sleep apnea) 12/06/2016   • Seasonal allergies 12/06/2016   • Hypokalemia 03/10/2016   • H/O knee surgery 03/10/2016   • Pain and swelling of left knee 03/10/2016   • Primary osteoarthritis of left knee 03/07/2016   • Back pain 05/24/2013     She reports frequent, similar past episodes.

## 2019-01-06 ENCOUNTER — PATIENT OUTREACH (OUTPATIENT)
Dept: HEALTH INFORMATION MANAGEMENT | Facility: OTHER | Age: 74
End: 2019-01-06

## 2019-06-12 ENCOUNTER — HOSPITAL ENCOUNTER (OUTPATIENT)
Dept: RADIOLOGY | Facility: MEDICAL CENTER | Age: 74
End: 2019-06-12
Attending: FAMILY MEDICINE
Payer: MEDICARE

## 2019-06-12 DIAGNOSIS — Z12.31 VISIT FOR SCREENING MAMMOGRAM: ICD-10-CM

## 2019-06-12 PROCEDURE — 77063 BREAST TOMOSYNTHESIS BI: CPT

## 2019-06-17 ENCOUNTER — HOSPITAL ENCOUNTER (EMERGENCY)
Facility: MEDICAL CENTER | Age: 74
End: 2019-06-17
Attending: EMERGENCY MEDICINE
Payer: MEDICARE

## 2019-06-17 ENCOUNTER — APPOINTMENT (OUTPATIENT)
Dept: RADIOLOGY | Facility: MEDICAL CENTER | Age: 74
End: 2019-06-17
Attending: EMERGENCY MEDICINE
Payer: MEDICARE

## 2019-06-17 VITALS
HEIGHT: 62 IN | DIASTOLIC BLOOD PRESSURE: 90 MMHG | OXYGEN SATURATION: 93 % | HEART RATE: 79 BPM | SYSTOLIC BLOOD PRESSURE: 155 MMHG | WEIGHT: 254.63 LBS | RESPIRATION RATE: 20 BRPM | TEMPERATURE: 98.8 F | BODY MASS INDEX: 46.86 KG/M2

## 2019-06-17 DIAGNOSIS — J06.9 UPPER RESPIRATORY TRACT INFECTION, UNSPECIFIED TYPE: ICD-10-CM

## 2019-06-17 DIAGNOSIS — J45.901 ASTHMA WITH ACUTE EXACERBATION, UNSPECIFIED ASTHMA SEVERITY, UNSPECIFIED WHETHER PERSISTENT: ICD-10-CM

## 2019-06-17 LAB
ALBUMIN SERPL BCP-MCNC: 3.3 G/DL (ref 3.2–4.9)
ALBUMIN/GLOB SERPL: 1.1 G/DL
ALP SERPL-CCNC: 98 U/L (ref 30–99)
ALT SERPL-CCNC: 17 U/L (ref 2–50)
ANION GAP SERPL CALC-SCNC: 8 MMOL/L (ref 0–11.9)
AST SERPL-CCNC: 24 U/L (ref 12–45)
BASOPHILS # BLD AUTO: 0.5 % (ref 0–1.8)
BASOPHILS # BLD: 0.04 K/UL (ref 0–0.12)
BILIRUB SERPL-MCNC: 0.5 MG/DL (ref 0.1–1.5)
BUN SERPL-MCNC: 13 MG/DL (ref 8–22)
CALCIUM SERPL-MCNC: 8.2 MG/DL (ref 8.4–10.2)
CHLORIDE SERPL-SCNC: 103 MMOL/L (ref 96–112)
CO2 SERPL-SCNC: 25 MMOL/L (ref 20–33)
CREAT SERPL-MCNC: 0.96 MG/DL (ref 0.5–1.4)
EKG IMPRESSION: NORMAL
EOSINOPHIL # BLD AUTO: 0.6 K/UL (ref 0–0.51)
EOSINOPHIL NFR BLD: 7.4 % (ref 0–6.9)
ERYTHROCYTE [DISTWIDTH] IN BLOOD BY AUTOMATED COUNT: 46.9 FL (ref 35.9–50)
GLOBULIN SER CALC-MCNC: 2.9 G/DL (ref 1.9–3.5)
GLUCOSE SERPL-MCNC: 116 MG/DL (ref 65–99)
HCT VFR BLD AUTO: 38.8 % (ref 37–47)
HGB BLD-MCNC: 12.8 G/DL (ref 12–16)
IMM GRANULOCYTES # BLD AUTO: 0.02 K/UL (ref 0–0.11)
IMM GRANULOCYTES NFR BLD AUTO: 0.2 % (ref 0–0.9)
LYMPHOCYTES # BLD AUTO: 2.35 K/UL (ref 1–4.8)
LYMPHOCYTES NFR BLD: 29 % (ref 22–41)
MCH RBC QN AUTO: 30.5 PG (ref 27–33)
MCHC RBC AUTO-ENTMCNC: 33 G/DL (ref 33.6–35)
MCV RBC AUTO: 92.4 FL (ref 81.4–97.8)
MONOCYTES # BLD AUTO: 0.62 K/UL (ref 0–0.85)
MONOCYTES NFR BLD AUTO: 7.7 % (ref 0–13.4)
NEUTROPHILS # BLD AUTO: 4.47 K/UL (ref 2–7.15)
NEUTROPHILS NFR BLD: 55.2 % (ref 44–72)
NRBC # BLD AUTO: 0 K/UL
NRBC BLD-RTO: 0 /100 WBC
PLATELET # BLD AUTO: 221 K/UL (ref 164–446)
PMV BLD AUTO: 9 FL (ref 9–12.9)
POTASSIUM SERPL-SCNC: 3.7 MMOL/L (ref 3.6–5.5)
PROT SERPL-MCNC: 6.2 G/DL (ref 6–8.2)
RBC # BLD AUTO: 4.2 M/UL (ref 4.2–5.4)
SODIUM SERPL-SCNC: 136 MMOL/L (ref 135–145)
WBC # BLD AUTO: 8.1 K/UL (ref 4.8–10.8)

## 2019-06-17 PROCEDURE — 700111 HCHG RX REV CODE 636 W/ 250 OVERRIDE (IP): Performed by: EMERGENCY MEDICINE

## 2019-06-17 PROCEDURE — 36415 COLL VENOUS BLD VENIPUNCTURE: CPT

## 2019-06-17 PROCEDURE — 94760 N-INVAS EAR/PLS OXIMETRY 1: CPT

## 2019-06-17 PROCEDURE — 85025 COMPLETE CBC W/AUTO DIFF WBC: CPT

## 2019-06-17 PROCEDURE — 94640 AIRWAY INHALATION TREATMENT: CPT

## 2019-06-17 PROCEDURE — 700101 HCHG RX REV CODE 250: Performed by: EMERGENCY MEDICINE

## 2019-06-17 PROCEDURE — 71045 X-RAY EXAM CHEST 1 VIEW: CPT

## 2019-06-17 PROCEDURE — 99284 EMERGENCY DEPT VISIT MOD MDM: CPT

## 2019-06-17 PROCEDURE — 93005 ELECTROCARDIOGRAM TRACING: CPT

## 2019-06-17 PROCEDURE — 93005 ELECTROCARDIOGRAM TRACING: CPT | Performed by: EMERGENCY MEDICINE

## 2019-06-17 PROCEDURE — 80053 COMPREHEN METABOLIC PANEL: CPT

## 2019-06-17 RX ORDER — IPRATROPIUM BROMIDE AND ALBUTEROL SULFATE 2.5; .5 MG/3ML; MG/3ML
3 SOLUTION RESPIRATORY (INHALATION)
Status: COMPLETED | OUTPATIENT
Start: 2019-06-17 | End: 2019-06-17

## 2019-06-17 RX ORDER — PREDNISONE 20 MG/1
60 TABLET ORAL ONCE
Status: COMPLETED | OUTPATIENT
Start: 2019-06-17 | End: 2019-06-17

## 2019-06-17 RX ORDER — PREDNISONE 20 MG/1
20 TABLET ORAL 2 TIMES DAILY
Qty: 10 TAB | Refills: 0 | Status: SHIPPED | OUTPATIENT
Start: 2019-06-17 | End: 2019-06-22

## 2019-06-17 RX ADMIN — IPRATROPIUM BROMIDE AND ALBUTEROL SULFATE 3 ML: .5; 3 SOLUTION RESPIRATORY (INHALATION) at 17:54

## 2019-06-17 RX ADMIN — PREDNISONE 60 MG: 20 TABLET ORAL at 17:49

## 2019-06-17 NOTE — ED TRIAGE NOTES
"Pt ambulates to triage with stable gait, alert and oriented.  Chief Complaint   Patient presents with   • Cough     Pt c/o cough for started week of antibiotic by PCP 6/6/19. Pt states \" Ican't cough stuff up anymore\"   • Shortness of Breath     pt has increased work of breathing with exertion.     /90   Pulse 93   Temp 37.1 °C (98.8 °F) (Temporal)   Resp 20       "

## 2019-06-18 NOTE — FLOWSHEET NOTE
06/17/19 1754   Events/Summary/Plan   Events/Summary/Plan SVN   Interdisciplinary Plan of Care-Goals (Indications)   Bronchodilator Indications History / Diagnosis   Interdisciplinary Plan of Care-Outcomes    Bronchodilator Outcome Patient at Stable Baseline   Education   Education Yes - Pt. / Family has been Instructed in use of Respiratory Equipment;Yes - Pt. / Family has been Instructed in use of Respiratory Medications and Adverse Reactions   RT Assessment of Delivered Medications   Evaluation of Medication Delivery Daily Yes-- Pt /Family has been Instructed in use of Respiratory Medications and Adverse Reactions   SVN Group   #SVN Performed Yes   Given By: Mouthpiece   Date SVN Last Changed 06/17/19   Date SVN Next Change Due (Q 7 Days) 06/24/19   Respiratory WDL   Respiratory (WDL) X   Chest Exam   Respiration (!) 41   Pulse 65   Heart Rate (Monitored) 85   Breath Sounds   Pre/Post Intervention Post Intervention Assessment   RUL Breath Sounds Diminished;Coarse Crackles   RML Breath Sounds Diminished   RLL Breath Sounds Diminished   MIGUEL Breath Sounds Diminished;Coarse Crackles   LLL Breath Sounds Diminished   Oximetry   #Pulse Oximetry (Single Determination) Yes   Oxygen   Home O2 Use Prior To Admission? No   Pulse Oximetry (!) 78 %   O2 Daily Delivery Respiratory  Room Air with O2 Available   Room Air Challenge Pass

## 2019-06-18 NOTE — ED PROVIDER NOTES
"ED Provider Note    CHIEF COMPLAINT  Chief Complaint   Patient presents with   • Cough     Pt c/o cough for started week of antibiotic by PCP 6/6/19. Pt states \" Ican't cough stuff up anymore\"   • Shortness of Breath     pt has increased work of breathing with exertion.       HPI  Cyndy Petit is a 73 y.o. female who presents to the emergency department complaining of 2 weeks of cough occasionally productive with yellow or white sputum.  The patient has not taken her temperature but says occasionally she feels hot and cold.  She also has a history of asthma and has been using her nebulizer machine at home.  Her doctor has placed her on a 10-day course of Levaquin followed by an additional 7-day course and she does not feel that this has led to any improvement.  She is come in today and would like to be started on prednisone    REVIEW OF SYSTEMS no hemoptysis no chest pain no vomiting or diarrhea.  All other systems negative    PAST MEDICAL HISTORY  Past Medical History:   Diagnosis Date   • Arthritis    • Asthma    • Carpal tunnel syndrome    • Cold 4-8-2017    cold; taking antibiotics  4-   • Depression    • DJD (degenerative joint disease)    • GERD (gastroesophageal reflux disease)    • GERD (gastroesophageal reflux disease)    • Heart burn    • Hiatus hernia syndrome    • Hypertension    • Obesity    • Obstructive sleep apnea    • Pain     back, knees   • Sleep apnea     CPAP   • Snoring    • Sorethroat 2/09/2016   • Thrush     from \"Advair\", takes nystatin   • Urinary incontinence        FAMILY HISTORY  Family History   Problem Relation Age of Onset   • Heart Disease Father    • Arthritis Father    • Lung Disease Father         asthma   • Psychiatry Father         depression   • Alcohol/Drug Father    • Depression Father    • Hypertension Mother    • Cancer Mother    • Heart Disease Mother    • Arthritis Mother    • Stroke Mother    • Hyperlipidemia Unknown    • Alcohol/Drug Brother    • " Depression Daughter    • Drug abuse Son         in remission   • Bipolar disorder Grandchild    • Genetic Neg Hx    • Diabetes Neg Hx        SOCIAL HISTORY  Social History     Social History   • Marital status:      Spouse name: N/A   • Number of children: N/A   • Years of education: N/A     Social History Main Topics   • Smoking status: Former Smoker     Packs/day: 1.00     Years: 6.00     Types: Cigarettes     Start date: 1/1/1972     Quit date: 1/1/1978   • Smokeless tobacco: Never Used      Comment: 1978   • Alcohol use No   • Drug use: No   • Sexual activity: Not on file     Other Topics Concern   • Not on file     Social History Narrative    ** Merged History Encounter **            SURGICAL HISTORY  Past Surgical History:   Procedure Laterality Date   • KNEE ARTHROPLASTY TOTAL Right 5/1/2017    Procedure: KNEE ARTHROPLASTY TOTAL;  Surgeon: Jesús Rutledge M.D.;  Location: [facility];  Service:    • KNEE ARTHROPLASTY TOTAL Left 3/7/2016    Procedure: KNEE ARTHROPLASTY TOTAL;  Surgeon: Jesús Rutledge M.D.;  Location: [facility];  Service:    • LUMBAR FUSION POSTERIOR  9/13/2013    Performed by Kaushal Ayala M.D. at [facility]ERY [facility]   • LUMBAR LAMINECTOMY DISKECTOMY  9/13/2013    Performed by Kaushal Ayala M.D. at SUR[facility]ERY [facility]   • LUMBAR LAMINECTOMY DISKECTOMY  5/30/2013    Performed by Kaushal Ayala M.D. at [facility]   • CHOLECYSTECTOMY  2010    laparoscopic   • OTHER NEUROLOGICAL SURG  2/2009    I&D of brain from sinus infection   • FORAMINOTOMY  11/26/08    Performed by [facility] at [facility]   • HARDWARE REMOVAL NEURO  11/26/08    Performed by [facility] at [facility]   • KNEE ARTHROSCOPY Right 2008   • LUMBAR FUSION POSTERIOR  2005   • CARPAL TUNNEL RELEASE Right 2000   • SHOULDER ARTHROTOMY Right 2000   • EYE SURGERY Bilateral     Cataract surgery 2/2018, 3/2018       CURRENT  "MEDICATIONS  Home Medications     Reviewed by Marsha Renteria R.N. (Registered Nurse) on 06/17/19 at 1542  Med List Status: <None>   Medication Last Dose Status   albuterol (PROAIR HFA) 108 (90 BASE) MCG/ACT AERS  Active   amLODIPine (NORVASC) 5 MG Tab  Active   diphenhydrAMINE (BENADRYL) 50 MG Cap  Active   docusate sodium (COLACE) 100 MG Cap  Active   duloxetine (CYMBALTA) 60 MG Cap DR Particles delayed-release capsule  Active   fluticasone (FLONASE) 50 MCG/ACT nasal spray  Active   fluticasone-salmeterol (ADVAIR DISKUS) 500-50 MCG/DOSE AEROSOL POWDER, BREATH ACTIVATED  Active   gabapentin (NEURONTIN) 300 MG Cap  Active   hydroCHLOROthiazide (HYDRODIURIL) 25 MG Tab  Active   hydrocodone-acetaminophen (NORCO) 5-325 MG Tab per tablet  Active   ipratropium-albuterol (DUONEB) 0.5-2.5 (3) MG/3ML nebulizer solution  Active   losartan (COZAAR) 100 MG TABS  Active   montelukast (SINGULAIR) 10 MG TABS  Active   Multiple Vitamins-Minerals (HAIR SKIN AND NAILS FORMULA) Tab  Active   multivitamin (THERAGRAN) Tab  Active   nystatin (MYCOSTATIN) 649995 UNIT/ML SUSP  Active   omeprazole (PRILOSEC) 20 MG CPDR  Active   potassium chloride SA (KDUR) 20 MEQ Tab CR  Active   trazodone (DESYREL) 50 MG TABS  Active                ALLERGIES  Allergies   Allergen Reactions   • Allergy      Unknown antibiotic   • Cefepime Hives   • Cephalosporins Hives   • Clarithromycin Hives   • Cleocin [Clindamycin] Itching   • Meropenem Hives   • Morphine Itching   • Sulfamethoxazole-Trimethoprim Itching       PHYSICAL EXAM  VITAL SIGNS: /90   Pulse 65   Temp 37.1 °C (98.8 °F) (Temporal)   Resp 20   Ht 1.575 m (5' 2\")   Wt 115.5 kg (254 lb 10.1 oz)   SpO2 97%   BMI 46.57 kg/m²    Oxygen saturation is interpreted as adequate  Constitutional: Awake verbal nontoxic-appearing  HENT: Mucous membranes are moist throat clear  Eyes: No erythema discharge or jaundice  Neck: Trachea midline no JVD  Cardiovascular: Regular rate and " rhythm  Lungs: Slight expiratory wheezes bilaterally and she does have a coarse cough at the time of arrival  Skin: Warm and dry  Musculoskeletal: No acute bony deformity no leg edema or calf tenderness  Neurologic: Awake verbal moving all extremities without difficulty    Laboratory  CBC shows a normal white blood cell count of 8.1 hemoglobin is adequate 12.8 complete metabolic panel is unremarkable    EKG interpretation  Twelve-lead EKG shows sinus rhythm 80 bpm there is no pathologic ST elevation or depression or ectopy VT interval is 160 ms QTc interval is 455 ms    Radiology  DX-CHEST-PORTABLE (1 VIEW)   Final Result      No acute cardiopulmonary findings.            MEDICAL DECISION MAKING and DISPOSITION  In the emergency department the patient was given 60 mg of oral prednisone and an albuterol Atrovent nebulizer treatment.  She is feeling a lot better she is breathing easier I have reexamined her she is moving air much more easily and wheezes have resolved.  At this point in time I do not think it is going to be that helpful to continue the patient on antibiotics but I do think that she would benefit from a short course of prednisone so I written her prescription for 5 days.  She is to continue her home nebulizers.  The patient is to return here at once if she feels she is developing new or worsening symptoms and otherwise she is to call her primary care doctor in the morning and arrange office recheck this week    IMPRESSION  1.  Asthma with acute exacerbation  2.  Upper respiratory tract infection      Electronically signed by: Rob Jaffe, 6/17/2019 6:14 PM

## 2019-06-18 NOTE — ED NOTES
Dc instructions and medications discussed with patient and  at bedside. IV removed. VSS. All questions answered at this time. Pt ambulated to lobby with prescriptions and steady gait.

## 2019-08-21 ENCOUNTER — HOSPITAL ENCOUNTER (OUTPATIENT)
Dept: LAB | Facility: MEDICAL CENTER | Age: 74
End: 2019-08-21
Attending: FAMILY MEDICINE
Payer: MEDICARE

## 2019-08-21 LAB
ALBUMIN SERPL BCP-MCNC: 3.7 G/DL (ref 3.2–4.9)
ALBUMIN/GLOB SERPL: 1.3 G/DL
ALP SERPL-CCNC: 90 U/L (ref 30–99)
ALT SERPL-CCNC: 14 U/L (ref 2–50)
ANION GAP SERPL CALC-SCNC: 8 MMOL/L (ref 0–11.9)
AST SERPL-CCNC: 18 U/L (ref 12–45)
BILIRUB SERPL-MCNC: 0.6 MG/DL (ref 0.1–1.5)
BUN SERPL-MCNC: 22 MG/DL (ref 8–22)
CALCIUM SERPL-MCNC: 8.6 MG/DL (ref 8.5–10.5)
CHLORIDE SERPL-SCNC: 106 MMOL/L (ref 96–112)
CHOLEST SERPL-MCNC: 150 MG/DL (ref 100–199)
CO2 SERPL-SCNC: 26 MMOL/L (ref 20–33)
CREAT SERPL-MCNC: 0.9 MG/DL (ref 0.5–1.4)
FASTING STATUS PATIENT QL REPORTED: NORMAL
GLOBULIN SER CALC-MCNC: 2.8 G/DL (ref 1.9–3.5)
GLUCOSE SERPL-MCNC: 103 MG/DL (ref 65–99)
HDLC SERPL-MCNC: 48 MG/DL
LDLC SERPL CALC-MCNC: 89 MG/DL
POTASSIUM SERPL-SCNC: 3.8 MMOL/L (ref 3.6–5.5)
PROT SERPL-MCNC: 6.5 G/DL (ref 6–8.2)
SODIUM SERPL-SCNC: 140 MMOL/L (ref 135–145)
TRIGL SERPL-MCNC: 64 MG/DL (ref 0–149)
TSH SERPL DL<=0.005 MIU/L-ACNC: 1.59 UIU/ML (ref 0.38–5.33)

## 2019-08-21 PROCEDURE — 36415 COLL VENOUS BLD VENIPUNCTURE: CPT

## 2019-08-21 PROCEDURE — 80053 COMPREHEN METABOLIC PANEL: CPT

## 2019-08-21 PROCEDURE — 84443 ASSAY THYROID STIM HORMONE: CPT

## 2019-08-21 PROCEDURE — 80061 LIPID PANEL: CPT

## 2019-11-07 ENCOUNTER — SLEEP CENTER VISIT (OUTPATIENT)
Dept: SLEEP MEDICINE | Facility: MEDICAL CENTER | Age: 74
End: 2019-11-07
Payer: MEDICARE

## 2019-11-07 VITALS
SYSTOLIC BLOOD PRESSURE: 128 MMHG | OXYGEN SATURATION: 97 % | HEART RATE: 84 BPM | DIASTOLIC BLOOD PRESSURE: 72 MMHG | RESPIRATION RATE: 16 BRPM

## 2019-11-07 DIAGNOSIS — J45.30 MILD PERSISTENT ASTHMA WITHOUT COMPLICATION: ICD-10-CM

## 2019-11-07 DIAGNOSIS — G47.33 OSA (OBSTRUCTIVE SLEEP APNEA): ICD-10-CM

## 2019-11-07 DIAGNOSIS — J30.2 SEASONAL ALLERGIES: ICD-10-CM

## 2019-11-07 PROCEDURE — 99214 OFFICE O/P EST MOD 30 MIN: CPT | Performed by: NURSE PRACTITIONER

## 2019-11-07 RX ORDER — AMLODIPINE BESYLATE 10 MG/1
TABLET ORAL
Refills: 2 | COMMUNITY
Start: 2019-10-09

## 2019-11-07 RX ORDER — NYSTATIN 100000 U/G
OINTMENT TOPICAL
Refills: 0 | COMMUNITY
Start: 2019-09-13 | End: 2019-11-07

## 2019-11-07 RX ORDER — INFLUENZA A VIRUS A/MICHIGAN/45/2015 X-275 (H1N1) ANTIGEN (FORMALDEHYDE INACTIVATED), INFLUENZA A VIRUS A/SINGAPORE/INFIMH-16-0019/2016 IVR-186 (H3N2) ANTIGEN (FORMALDEHYDE INACTIVATED), AND INFLUENZA B VIRUS B/MARYLAND/15/2016 BX-69A (A B/COLORADO/6/2017-LIKE VIRUS) ANTIGEN (FORMALDEHYDE INACTIVATED) 60; 60; 60 UG/.5ML; UG/.5ML; UG/.5ML
INJECTION, SUSPENSION INTRAMUSCULAR
COMMUNITY
Start: 2019-09-05 | End: 2019-09-05

## 2019-11-07 RX ORDER — DULOXETIN HYDROCHLORIDE 30 MG/1
CAPSULE, DELAYED RELEASE ORAL
Refills: 2 | COMMUNITY
Start: 2019-10-09 | End: 2022-02-22

## 2019-11-07 RX ORDER — LEVOFLOXACIN 500 MG/1
500 TABLET, FILM COATED ORAL
Refills: 0 | COMMUNITY
Start: 2019-08-14 | End: 2021-03-29

## 2019-11-07 ASSESSMENT — ENCOUNTER SYMPTOMS
BRUISES/BLEEDS EASILY: 0
CHILLS: 0
GASTROINTESTINAL NEGATIVE: 1
SINUS PAIN: 0
NERVOUS/ANXIOUS: 0
EYE DISCHARGE: 0
FEVER: 0
NEUROLOGICAL NEGATIVE: 1
MEMORY LOSS: 0
CARDIOVASCULAR NEGATIVE: 1
HALLUCINATIONS: 0
WEIGHT LOSS: 0
STRIDOR: 0
EYE PAIN: 0
RESPIRATORY NEGATIVE: 1
INSOMNIA: 1
DEPRESSION: 0
DIAPHORESIS: 0
MUSCULOSKELETAL NEGATIVE: 1
SORE THROAT: 0

## 2019-11-07 ASSESSMENT — LIFESTYLE VARIABLES: SUBSTANCE_ABUSE: 0

## 2019-11-07 NOTE — PROGRESS NOTES
"Chief Complaint   Patient presents with   • Apnea     Last Seen 11/02/18         HPI: This patient is a 73 y.o. female, who presents for annual follow-up of obstructive sleep apnea.     Polysomnogram indicates AHI 12.4 and minimum saturation 84%. She was titrated to CPAP 16 cm. Her pressure was decreased to 14 cm H2O for tolerance.  She has been noncompliant with her machine. Says her humidifier broke and she stopped using.  She also reports that her pressure felt too high.  She is requesting a decrease.  She reports more fatigue and restless sleep since discontinuing therapy.  She is interested in restarting it.  She needs new supplies.     Patient has a history of asthma, PFTs from 2015 are normal. The patient is compliant with Advair 500/50 µg twice daily, and albuterol HFA as needed.  She was treated for asthma exacerbation in June of this year.  She does not recall this incident.  She says her symptoms been well controlled.  Denies dyspnea, cough or wheeze.  Compliant with inhalers.     She has significant seasonal allergies, takes Singulair and Flonase with improvement in symptoms.  She does follow with ENT.  Allergies are well controlled with current medication.  Her worst time tends to be spring and summer months.          Past Medical History:   Diagnosis Date   • Arthritis    • Asthma    • Carpal tunnel syndrome    • Cold 4-8-2017    cold; taking antibiotics  4-   • Depression    • DJD (degenerative joint disease)    • GERD (gastroesophageal reflux disease)    • GERD (gastroesophageal reflux disease)    • Heart burn    • Hiatus hernia syndrome    • Hypertension    • Obesity    • Obstructive sleep apnea    • Pain     back, knees   • Sleep apnea     CPAP   • Snoring    • Sorethroat 2/09/2016   • Thrush     from \"Advair\", takes nystatin   • Urinary incontinence        Social History     Tobacco Use   • Smoking status: Former Smoker     Packs/day: 1.00     Years: 6.00     Pack years: 6.00     Types: " Cigarettes     Start date: 1972     Last attempt to quit: 1978     Years since quittin.8   • Smokeless tobacco: Never Used   • Tobacco comment:    Substance Use Topics   • Alcohol use: No     Alcohol/week: 0.0 oz   • Drug use: No       Family History   Problem Relation Age of Onset   • Heart Disease Father    • Arthritis Father    • Lung Disease Father         asthma   • Psychiatric Illness Father         depression   • Alcohol/Drug Father    • Depression Father    • Hypertension Mother    • Cancer Mother    • Heart Disease Mother    • Arthritis Mother    • Stroke Mother    • Hyperlipidemia Other    • Alcohol/Drug Brother    • Depression Daughter    • Drug abuse Son         in remission   • Bipolar disorder Grandchild    • Genetic Disorder Neg Hx    • Diabetes Neg Hx        Immunization History   Administered Date(s) Administered   • Influenza Seasonal Injectable 2013, 2013, 10/01/2016   • Influenza TIV (IM) 2013, 10/01/2015, 10/01/2016   • Influenza Vaccine Adult HD 09/15/2011, 10/21/2014, 10/07/2015, 10/11/2016, 2017, 2018, 2019   • Influenza Vaccine Pediatric Split 10/14/2010   • Pneumococcal Conjugate Vaccine (Prevnar/PCV-13) 10/01/2015, 10/26/2015   • Pneumococcal polysaccharide vaccine (PPSV-23) 2009, 10/21/2014   • Tdap Vaccine 10/21/2014   • Zoster Vaccine Live (ZVL) (Zostavax) 2014       Current medications as of today   Current Outpatient Medications   Medication Sig Dispense Refill   • fluticasone-salmeterol (ADVAIR DISKUS) 500-50 MCG/DOSE AEROSOL POWDER, BREATH ACTIVATED Inhale 1 Puff by mouth every 12 hours.     • duloxetine (CYMBALTA) 60 MG Cap DR Particles delayed-release capsule Take 60 mg by mouth every day.     • montelukast (SINGULAIR) 10 MG TABS Take 10 mg by mouth every morning.     • DULoxetine (CYMBALTA) 30 MG Cap DR Particles TAKE 1 CAPSULE BY MOUTH ONCE DAILY TAKE WITH 60 MG DULOXETINE  2   • levoFLOXacin (LEVAQUIN) 500 MG  tablet Take 500 mg by mouth.  0   • nystatin (MYCOSTATIN) 189230 UNIT/GM Ointment APPLY UNDER RIGHT BREAST THREE TIMES DAILY FOR UP TO 10 DAYS  0   • amLODIPine (NORVASC) 10 MG Tab TAKE 1 TABLET BY MOUTH ONCE DAILY. REPLACES 5 MG  2   • ipratropium-albuterol (DUONEB) 0.5-2.5 (3) MG/3ML nebulizer solution 3 mL by Nebulization route 4 times a day.     • gabapentin (NEURONTIN) 300 MG Cap Take 300 mg by mouth 4 times a day.     • potassium chloride SA (KDUR) 20 MEQ Tab CR Take 1 Tab by mouth 2 Times a Day. 6 Tab 0   • hydroCHLOROthiazide (HYDRODIURIL) 25 MG Tab Take 1 daily for 3 days- Then 1 daily as needed 30 Tab 1   • fluticasone (FLONASE) 50 MCG/ACT nasal spray Spray 1 Spray in nose every day.     • amLODIPine (NORVASC) 5 MG Tab Take 10 mg by mouth every day.     • multivitamin (THERAGRAN) Tab Take 1 Tab by mouth every day.     • Multiple Vitamins-Minerals (HAIR SKIN AND NAILS FORMULA) Tab Take 1 Tab by mouth every day.     • docusate sodium (COLACE) 100 MG Cap Take 100 mg by mouth 2 times a day.     • hydrocodone-acetaminophen (NORCO) 5-325 MG Tab per tablet Take 1-2 Tabs by mouth every 6 hours as needed.     • diphenhydrAMINE (BENADRYL) 50 MG Cap Take 50 mg by mouth every bedtime.     • albuterol (PROAIR HFA) 108 (90 BASE) MCG/ACT AERS Inhale 2 Puffs by mouth every 6 hours as needed for Shortness of Breath. Shortness of breath     • losartan (COZAAR) 100 MG TABS Take 100 mg by mouth every day.     • nystatin (MYCOSTATIN) 347581 UNIT/ML SUSP Take 100,000 Units by mouth 5 Times a Day.     • trazodone (DESYREL) 50 MG TABS Take 50 mg by mouth every evening.     • omeprazole (PRILOSEC) 20 MG CPDR Take 20 mg by mouth 2 times a day.       No current facility-administered medications for this visit.        Allergies: Allergy; Cefepime; Cephalosporins; Clarithromycin; Cleocin [clindamycin]; Meropenem; Morphine; and Sulfamethoxazole-trimethoprim    /72 (BP Location: Left arm, Patient Position: Sitting, BP Cuff Size:  Adult)   Pulse 84   Resp 16   SpO2 97%       Review of Systems   Constitutional: Positive for malaise/fatigue. Negative for chills, diaphoresis, fever and weight loss.   HENT: Positive for congestion. Negative for ear discharge, ear pain, hearing loss, nosebleeds, sinus pain, sore throat and tinnitus.    Eyes: Negative for pain and discharge.   Respiratory: Negative.  Negative for stridor.    Cardiovascular: Negative.    Gastrointestinal: Negative.    Musculoskeletal: Negative.    Skin: Negative.    Neurological: Negative.    Endo/Heme/Allergies: Negative for environmental allergies. Does not bruise/bleed easily.   Psychiatric/Behavioral: Negative for depression, hallucinations, memory loss, substance abuse and suicidal ideas. The patient has insomnia. The patient is not nervous/anxious.        Physical Exam   Constitutional: She is oriented to person, place, and time.   Obese, no acute distress   HENT:   Head: Normocephalic and atraumatic.   Mouth/Throat: Oropharynx is clear and moist.   Eyes: Pupils are equal, round, and reactive to light.   Neck: Normal range of motion. Neck supple. No tracheal deviation present.   Cardiovascular: Normal rate, regular rhythm and normal heart sounds.   Pulmonary/Chest: Effort normal and breath sounds normal. No respiratory distress. She has no wheezes. She has no rales.   Neurological: She is alert and oriented to person, place, and time.   Skin: Skin is warm and dry.   Psychiatric: Mood, memory, affect and judgment normal.   Vitals reviewed.      Diagnoses/Plan:    1. BEATRIZ (obstructive sleep apnea)  Send an order to a DME company to maintenance machine and replace humidification.  We will also send an order for new supplies.  She will restart therapy as soon as possible.  Will decrease pressure to 12 cm H2O.  I would like her back in 3 months for compliance check.  - DME Mask and Supplies  - DME Other    2. Seasonal allergies  Stable, continue Flonase and daily Singulair    3.  Mild persistent asthma without complication  Stable, continue current bronchodilators including Advair twice daily and albuterol when needed      This dictation was created using voice recognition software. The accuracy of the dictation is limited to the abilities of the software. I expect there may be some errors of grammar and possibly content.

## 2020-01-17 ENCOUNTER — HOSPITAL ENCOUNTER (OUTPATIENT)
Dept: RADIOLOGY | Facility: MEDICAL CENTER | Age: 75
End: 2020-01-17
Attending: FAMILY MEDICINE
Payer: MEDICARE

## 2020-01-17 DIAGNOSIS — S29.012A STRAIN OF MUSCLE AND TENDON OF BACK WALL OF THORAX, INITIAL ENCOUNTER: ICD-10-CM

## 2020-01-17 PROCEDURE — 71046 X-RAY EXAM CHEST 2 VIEWS: CPT

## 2020-01-17 PROCEDURE — 72072 X-RAY EXAM THORAC SPINE 3VWS: CPT

## 2020-02-20 ENCOUNTER — APPOINTMENT (OUTPATIENT)
Dept: SLEEP MEDICINE | Facility: MEDICAL CENTER | Age: 75
End: 2020-02-20

## 2020-06-19 ENCOUNTER — HOSPITAL ENCOUNTER (OUTPATIENT)
Dept: LAB | Facility: MEDICAL CENTER | Age: 75
End: 2020-06-19
Attending: INTERNAL MEDICINE
Payer: MEDICARE

## 2020-06-19 LAB
ALBUMIN SERPL BCP-MCNC: 3.8 G/DL (ref 3.2–4.9)
ALBUMIN/GLOB SERPL: 1.2 G/DL
ALP SERPL-CCNC: 100 U/L (ref 30–99)
ALT SERPL-CCNC: 16 U/L (ref 2–50)
ANION GAP SERPL CALC-SCNC: 8 MMOL/L (ref 7–16)
AST SERPL-CCNC: 20 U/L (ref 12–45)
BASOPHILS # BLD AUTO: 0.8 % (ref 0–1.8)
BASOPHILS # BLD: 0.06 K/UL (ref 0–0.12)
BILIRUB SERPL-MCNC: 0.4 MG/DL (ref 0.1–1.5)
BUN SERPL-MCNC: 14 MG/DL (ref 8–22)
CALCIUM SERPL-MCNC: 8.7 MG/DL (ref 8.5–10.5)
CHLORIDE SERPL-SCNC: 104 MMOL/L (ref 96–112)
CO2 SERPL-SCNC: 24 MMOL/L (ref 20–33)
CREAT SERPL-MCNC: 0.88 MG/DL (ref 0.5–1.4)
EOSINOPHIL # BLD AUTO: 0.41 K/UL (ref 0–0.51)
EOSINOPHIL NFR BLD: 5.5 % (ref 0–6.9)
ERYTHROCYTE [DISTWIDTH] IN BLOOD BY AUTOMATED COUNT: 50.1 FL (ref 35.9–50)
FASTING STATUS PATIENT QL REPORTED: NORMAL
GLOBULIN SER CALC-MCNC: 3.3 G/DL (ref 1.9–3.5)
GLUCOSE SERPL-MCNC: 112 MG/DL (ref 65–99)
HCT VFR BLD AUTO: 41.7 % (ref 37–47)
HGB BLD-MCNC: 13.5 G/DL (ref 12–16)
IMM GRANULOCYTES # BLD AUTO: 0.01 K/UL (ref 0–0.11)
IMM GRANULOCYTES NFR BLD AUTO: 0.1 % (ref 0–0.9)
LYMPHOCYTES # BLD AUTO: 2.02 K/UL (ref 1–4.8)
LYMPHOCYTES NFR BLD: 27.1 % (ref 22–41)
MCH RBC QN AUTO: 30.8 PG (ref 27–33)
MCHC RBC AUTO-ENTMCNC: 32.4 G/DL (ref 33.6–35)
MCV RBC AUTO: 95.2 FL (ref 81.4–97.8)
MONOCYTES # BLD AUTO: 0.56 K/UL (ref 0–0.85)
MONOCYTES NFR BLD AUTO: 7.5 % (ref 0–13.4)
NEUTROPHILS # BLD AUTO: 4.4 K/UL (ref 2–7.15)
NEUTROPHILS NFR BLD: 59 % (ref 44–72)
NRBC # BLD AUTO: 0 K/UL
NRBC BLD-RTO: 0 /100 WBC
PLATELET # BLD AUTO: 245 K/UL (ref 164–446)
PMV BLD AUTO: 9.9 FL (ref 9–12.9)
POTASSIUM SERPL-SCNC: 3.9 MMOL/L (ref 3.6–5.5)
PROT SERPL-MCNC: 7.1 G/DL (ref 6–8.2)
RBC # BLD AUTO: 4.38 M/UL (ref 4.2–5.4)
SODIUM SERPL-SCNC: 136 MMOL/L (ref 135–145)
WBC # BLD AUTO: 7.5 K/UL (ref 4.8–10.8)

## 2020-06-19 PROCEDURE — 86357 NK CELLS TOTAL COUNT: CPT

## 2020-06-19 PROCEDURE — 86162 COMPLEMENT TOTAL (CH50): CPT

## 2020-06-19 PROCEDURE — 82784 ASSAY IGA/IGD/IGG/IGM EACH: CPT

## 2020-06-19 PROCEDURE — 80053 COMPREHEN METABOLIC PANEL: CPT

## 2020-06-19 PROCEDURE — 82785 ASSAY OF IGE: CPT

## 2020-06-19 PROCEDURE — 86317 IMMUNOASSAY INFECTIOUS AGENT: CPT | Mod: 91

## 2020-06-19 PROCEDURE — 86360 T CELL ABSOLUTE COUNT/RATIO: CPT

## 2020-06-19 PROCEDURE — 85025 COMPLETE CBC W/AUTO DIFF WBC: CPT

## 2020-06-19 PROCEDURE — 36415 COLL VENOUS BLD VENIPUNCTURE: CPT

## 2020-06-19 PROCEDURE — 86359 T CELLS TOTAL COUNT: CPT

## 2020-06-19 PROCEDURE — 86355 B CELLS TOTAL COUNT: CPT

## 2020-06-21 LAB
ANNOTATION COMMENT IMP: NORMAL
CD19 CELLS NFR SPEC: 12 % (ref 5–21)
CD3 CELLS # BLD: 1407 CELLS/UL (ref 660–2200)
CD3 CELLS NFR SPEC: 72 % (ref 62–89)
CD3+CD4+ CELLS # BLD: 1030 CELLS/UL (ref 490–1600)
CD3+CD4+ CELLS NFR BLD: 53 % (ref 35–68)
CD3+CD4+ CELLS/CD3+CD8+ CLL BLD: 3.12 RATIO (ref 0.8–6.17)
CD3+CD8+ CELLS # BLD: 328 CELLS/UL (ref 150–1050)
CD3+CD8+ CELLS NFR SPEC: 17 % (ref 10–46)
CD3-CD16+CD56+ CELLS # SPEC: 301 CELLS/UL (ref 74–620)
CD3-CD16+CD56+ CELLS NFR SPEC: 15 % (ref 5–28)
CELLS.CD3-CD19+ [#/VOLUME] IN BLOOD: 232 CELLS/UL (ref 74–510)

## 2020-06-22 LAB
C DIPHTHERIAE IGG SER-ACNC: 0.4 IU/ML
CH50 SERPL-ACNC: 58.8 U/ML (ref 38.7–89.9)
IGA SERPL-MCNC: 221 MG/DL (ref 68–408)
IGG SERPL-MCNC: 1200 MG/DL (ref 768–1632)
IGM SERPL-MCNC: 86 MG/DL (ref 35–263)
S PN DA SERO 19F IGG SER-MCNC: 3.43 UG/ML
S PNEUM DA 1 IGG SER-MCNC: 10.93 UG/ML
S PNEUM DA 12F IGG SER-MCNC: 0.14 UG/ML
S PNEUM DA 14 IGG SER-MCNC: 2.91 UG/ML
S PNEUM DA 18C IGG SER-MCNC: 2.62 UG/ML
S PNEUM DA 23F IGG SER-MCNC: 6.61 UG/ML
S PNEUM DA 3 IGG SER-MCNC: 7.48 UG/ML
S PNEUM DA 4 IGG SER-MCNC: 2.86 UG/ML
S PNEUM DA 5 IGG SER-MCNC: 7.07 UG/ML
S PNEUM DA 6B IGG SER-MCNC: 1.58 UG/ML
S PNEUM DA 7F IGG SER-MCNC: 0.96 UG/ML
S PNEUM DA 8 IGG SER-MCNC: 0.41 UG/ML
S PNEUM DA 9N IGG SER-MCNC: 1.48 UG/ML
S PNEUM DA 9V IGG SER-MCNC: 2.77 UG/ML
S PNEUM SEROTYPE IGG SER-IMP: NORMAL

## 2020-06-23 LAB — IGE SERPL-ACNC: 100 KU/L

## 2020-07-06 ENCOUNTER — HOSPITAL ENCOUNTER (OUTPATIENT)
Dept: PULMONOLOGY | Facility: MEDICAL CENTER | Age: 75
End: 2020-07-06
Attending: INTERNAL MEDICINE
Payer: MEDICARE

## 2020-07-06 PROCEDURE — 94726 PLETHYSMOGRAPHY LUNG VOLUMES: CPT

## 2020-07-06 PROCEDURE — 94729 DIFFUSING CAPACITY: CPT

## 2020-07-06 PROCEDURE — 94060 EVALUATION OF WHEEZING: CPT

## 2020-07-06 RX ORDER — ALBUTEROL SULFATE 90 UG/1
2 AEROSOL, METERED RESPIRATORY (INHALATION)
Status: DISCONTINUED | OUTPATIENT
Start: 2020-07-06 | End: 2020-07-07 | Stop reason: HOSPADM

## 2020-07-06 ASSESSMENT — PULMONARY FUNCTION TESTS
FEV1/FVC: 86.19
FEV1_PERCENT_CHANGE: 5
FEV1/FVC_PREDICTED: 77.92
FEV1_LLN: 1.63
FEV1/FVC: 78
FEV1/FVC_PERCENT_PREDICTED: 100
FVC_LLN: 3.12
FEV1/FVC_PERCENT_PREDICTED: 112
FEV1/FVC_PERCENT_PREDICTED: 77
FEV1_PREDICTED: 1.96
FEV1_PERCENT_PREDICTED: 118
FEV1/FVC: 86.19
FVC_PREDICTED: 2.53
FVC_PERCENT_PREDICTED: 110
FEV1: 2.31
FVC: 2.79
FVC_PERCENT_PREDICTED: 105
FEV1/FVC_PERCENT_PREDICTED: 101
FEV1_LLN: 1.63
FEV1_PERCENT_PREDICTED: 111
FEV1/FVC_PERCENT_CHANGE: 9
FVC_LLN: 2.12
FEV1/FVC_PERCENT_PREDICTED: 110
FVC: 2.68
FEV1: 2.19
FEV1/FVC_PERCENT_CHANGE: -167
FEV1/FVC: 78.55
FEV1_PERCENT_CHANGE: -3
FEV1/FVC_PERCENT_LLN: 65.06
FEV1/FVC_PERCENT_LLN: 65.06

## 2020-07-13 PROCEDURE — 94729 DIFFUSING CAPACITY: CPT | Mod: 26 | Performed by: INTERNAL MEDICINE

## 2020-07-13 PROCEDURE — 94060 EVALUATION OF WHEEZING: CPT | Mod: 26 | Performed by: INTERNAL MEDICINE

## 2020-07-13 PROCEDURE — 94726 PLETHYSMOGRAPHY LUNG VOLUMES: CPT | Mod: 26 | Performed by: INTERNAL MEDICINE

## 2020-07-13 NOTE — PROCEDURES
PULMONARY FUNCTION TEST INTERPRETATION    REQUESTING PROVIDER:  Radames Tinajero MD    REASON FOR REQUEST:  Shortness of breath.    FINDINGS:  1.  Acceptable and reproducible.  2.  FEV1 2.19 liters (111%), FVC 2.79 liters (110%), ratio of 78%.  3.  Flow volume loops consistent with peripheral air obstruction.  4.  TLC 6.01 liters (126%).  5.  DLCO 26.61 mL/min/mmHg (148%).    IMPRESSION:  Normal spirometry and no response to bronchodilator in FEV1 or   FVC, but mid flow marked response to bronchodilator.  Evidence of air trapping   and hyperinflation with elevated DLCO very supportive of obstructive lung   disease.  Clinically correlate.       ____________________________________     Compa Rodriguez MD    FM / NTS    DD:  07/12/2020 13:53:06  DT:  07/12/2020 17:32:44    D#:  [id number]  Job#:  [id number]    cc: RADAMES TINAJERO MD

## 2020-08-14 ENCOUNTER — OFFICE VISIT (OUTPATIENT)
Dept: SLEEP MEDICINE | Facility: MEDICAL CENTER | Age: 75
End: 2020-08-14
Payer: MEDICARE

## 2020-08-14 VITALS
HEIGHT: 63 IN | WEIGHT: 271 LBS | HEART RATE: 89 BPM | SYSTOLIC BLOOD PRESSURE: 122 MMHG | RESPIRATION RATE: 18 BRPM | OXYGEN SATURATION: 95 % | DIASTOLIC BLOOD PRESSURE: 82 MMHG | BODY MASS INDEX: 48.02 KG/M2

## 2020-08-14 DIAGNOSIS — J45.30 MILD PERSISTENT ASTHMA WITHOUT COMPLICATION: ICD-10-CM

## 2020-08-14 DIAGNOSIS — G47.33 OSA (OBSTRUCTIVE SLEEP APNEA): ICD-10-CM

## 2020-08-14 DIAGNOSIS — J30.2 SEASONAL ALLERGIES: ICD-10-CM

## 2020-08-14 PROCEDURE — 99214 OFFICE O/P EST MOD 30 MIN: CPT | Performed by: INTERNAL MEDICINE

## 2020-08-14 ASSESSMENT — FIBROSIS 4 INDEX: FIB4 SCORE: 1.51

## 2020-08-14 NOTE — PROGRESS NOTES
"Chief Complaint   Patient presents with   • Follow-Up     BEATRIZ, 3 month FV       HPI: This patient is a 74 y.o. female presents for follow-up of obstructive sleep apnea and asthma.  She has mild BEATRIZ, AHI 12/h and titrated to CPAP: 16 cm of water.  Due to intolerance for CPAP pressures were gradually reduced to 12 cm of water.  She has now acclimated to CPAP and would like her pressures increased and she feels like she is suffocating at times.  Overall however she feels she is benefiting from CPAP.  Compliance card shows 100% CPAP usage for 5.5 hours nightly.  Average AHI is 2.1.  She requires new CPAP tubing.  Has asthma, on Advair 100 mcg.  She has had increased cough and postnasal drainage.  Occasional wheezing and dyspnea.  She has a history of environmental allergies and has been treated for sinusitis with multiple courses of antibiotics.  She has seen Dr. Tinajero, allergist, who allegedly told her \"nothing more I can do for you\".  She has an appointment pending with Dr. Rojas, ENT.  She is compliant with nasal steroids and antihistamines.  Pulmonary function testing showed normal lung function with FEV1 2.19 L 111%.    Past Medical History:   Diagnosis Date   • Arthritis    • Asthma    • Carpal tunnel syndrome    • Cold 4-8-2017    cold; taking antibiotics  4-   • Depression    • DJD (degenerative joint disease)    • GERD (gastroesophageal reflux disease)    • GERD (gastroesophageal reflux disease)    • Heart burn    • Hiatus hernia syndrome    • Hypertension    • Obesity    • Obstructive sleep apnea    • Pain     back, knees   • Sleep apnea     CPAP   • Snoring    • Sorethroat 2/09/2016   • Thrush     from \"Advair\", takes nystatin   • Urinary incontinence        Social History     Socioeconomic History   • Marital status:      Spouse name: Not on file   • Number of children: Not on file   • Years of education: Not on file   • Highest education level: Not on file   Occupational History   • Not on " file   Social Needs   • Financial resource strain: Not on file   • Food insecurity     Worry: Not on file     Inability: Not on file   • Transportation needs     Medical: Not on file     Non-medical: Not on file   Tobacco Use   • Smoking status: Former Smoker     Packs/day: 1.00     Years: 6.00     Pack years: 6.00     Types: Cigarettes     Start date: 1972     Quit date: 1978     Years since quittin.6   • Smokeless tobacco: Never Used   • Tobacco comment:    Substance and Sexual Activity   • Alcohol use: No     Alcohol/week: 0.0 oz   • Drug use: No   • Sexual activity: Not on file   Lifestyle   • Physical activity     Days per week: Not on file     Minutes per session: Not on file   • Stress: Not on file   Relationships   • Social connections     Talks on phone: Not on file     Gets together: Not on file     Attends [employer] service: Not on file     Active member of club or organization: Not on file     Attends meetings of clubs or organizations: Not on file     Relationship status: Not on file   • Intimate partner violence     Fear of current or ex partner: Not on file     Emotionally abused: Not on file     Physically abused: Not on file     Forced sexual activity: Not on file   Other Topics Concern   • Not on file   Social History Narrative    ** Merged History Encounter **            Family History   Problem Relation Age of Onset   • Heart Disease Father    • Arthritis Father    • Lung Disease Father         asthma   • Psychiatric Illness Father         depression   • Alcohol/Drug Father    • Depression Father    • Hypertension Mother    • Cancer Mother    • Heart Disease Mother    • Arthritis Mother    • Stroke Mother    • Hyperlipidemia Other    • Alcohol/Drug Brother    • Depression Daughter    • Drug abuse Son         in remission   • Bipolar disorder Grandchild    • Genetic Disorder Neg Hx    • Diabetes Neg Hx        Current Outpatient Medications on File Prior to Visit   Medication Sig  Dispense Refill   • DULoxetine (CYMBALTA) 30 MG Cap DR Particles TAKE 1 CAPSULE BY MOUTH ONCE DAILY TAKE WITH 60 MG DULOXETINE  2   • levoFLOXacin (LEVAQUIN) 500 MG tablet Take 500 mg by mouth.  0   • amLODIPine (NORVASC) 10 MG Tab TAKE 1 TABLET BY MOUTH ONCE DAILY. REPLACES 5 MG  2   • ipratropium-albuterol (DUONEB) 0.5-2.5 (3) MG/3ML nebulizer solution 3 mL by Nebulization route 4 times a day.     • gabapentin (NEURONTIN) 300 MG Cap Take 300 mg by mouth 4 times a day.     • potassium chloride SA (KDUR) 20 MEQ Tab CR Take 1 Tab by mouth 2 Times a Day. 6 Tab 0   • fluticasone-salmeterol (ADVAIR DISKUS) 500-50 MCG/DOSE AEROSOL POWDER, BREATH ACTIVATED Inhale 1 Puff by mouth every 12 hours.     • fluticasone (FLONASE) 50 MCG/ACT nasal spray Spray 1 Spray in nose every day.     • multivitamin (THERAGRAN) Tab Take 1 Tab by mouth every day.     • Multiple Vitamins-Minerals (HAIR SKIN AND NAILS FORMULA) Tab Take 1 Tab by mouth every day.     • hydrocodone-acetaminophen (NORCO) 5-325 MG Tab per tablet Take 1-2 Tabs by mouth every 6 hours as needed.     • duloxetine (CYMBALTA) 60 MG Cap DR Particles delayed-release capsule Take 60 mg by mouth every day.     • diphenhydrAMINE (BENADRYL) 50 MG Cap Take 50 mg by mouth every bedtime.     • albuterol (PROAIR HFA) 108 (90 BASE) MCG/ACT AERS Inhale 2 Puffs by mouth every 6 hours as needed for Shortness of Breath. Shortness of breath     • losartan (COZAAR) 100 MG TABS Take 100 mg by mouth every day.     • nystatin (MYCOSTATIN) 773797 UNIT/ML SUSP Take 100,000 Units by mouth 5 Times a Day.     • montelukast (SINGULAIR) 10 MG TABS Take 10 mg by mouth every morning.     • trazodone (DESYREL) 50 MG TABS Take 50 mg by mouth every evening.     • omeprazole (PRILOSEC) 20 MG CPDR Take 20 mg by mouth 2 times a day.     • amLODIPine (NORVASC) 5 MG Tab Take 10 mg by mouth every day.       No current facility-administered medications on file prior to visit.        Allergies: Allergy,  "Cefepime, Cephalosporins, Clarithromycin, Cleocin [clindamycin], Meropenem, Morphine, and Sulfamethoxazole-trimethoprim    ROS:   Constitutional: Denies fevers, chills, night sweats, fatigue or weight loss  Eyes: Denies vision loss, pain, drainage, double vision  Ears, Nose, Throat: Denies earache, difficulty hearing, tinnitus, +nasal congestion, denies hoarseness  Cardiovascular: Denies chest pain, tightness, palpitations, orthopnea or edema  Respiratory: As in HPI  Sleep: Denies daytime sleepiness, snoring, apneas, insomnia, morning headaches  GI: Denies heartburn, dysphagia, nausea, abdominal pain, diarrhea or constipation  : Denies frequent urination, hematuria, discharge or painful urination  Musculoskeletal: Denies back pain, painful joints, sore muscles  Neurological: Denies weakness or headaches  Skin: No rashes    /82 (BP Location: Other (Comment), Patient Position: Sitting, BP Cuff Size: Adult)   Pulse 89   Resp 18   Ht 1.588 m (5' 2.5\")   Wt 122.9 kg (271 lb)   SpO2 95%     Physical Exam:  Appearance: Well-nourished, well-developed, in no acute distress  HEENT: Normocephalic, atraumatic, white sclera, PERRLA  Neck: No adenopathy or masses  Respiratory: no intercostal retractions or accessory muscle use  Lungs auscultation: Clear to auscultation bilaterally  Cardiovascular: Regular rate rhythm. No murmurs, rubs or gallops.  No LE edema  Abdomen: soft, nondistended  Gait: Normal  Digits: No clubbing, cyanosis  Motor: No focal deficits  Orientation: Oriented to time, person and place    Diagnosis:  1. BEATRIZ (obstructive sleep apnea)     2. Mild persistent asthma without complication     3. Seasonal allergies         Plan:  In terms of sleep apnea, we will increase CPAP pressures to 14 cm of water.  She has acclimated to CPAP with excellent compliance and normal AHI on treatment.  Prescription submitted for CPAP supplies.  In terms of asthma, pulmonary function is normal.  Symptoms are exacerbated " by chronic sinusitis/environmental allergies.  Concur with ENT [id number] is pending appointment with Dr. Rojas, ENT.  Continue Advair 500/50 twice daily.  Use HFA 1 to 2 puffs every 4 hours as needed.    She is pending appointment with me at pulmonary clinic in the next month and will keep that appointment.  Return in about 6 months (around 2/14/2021).

## 2020-09-30 ENCOUNTER — HOSPITAL ENCOUNTER (OUTPATIENT)
Dept: RADIOLOGY | Facility: MEDICAL CENTER | Age: 75
End: 2020-09-30
Attending: OTOLARYNGOLOGY
Payer: MEDICARE

## 2020-09-30 DIAGNOSIS — J32.2 CHRONIC ETHMOIDAL SINUSITIS: ICD-10-CM

## 2020-09-30 DIAGNOSIS — J32.0 CHRONIC MAXILLARY SINUSITIS: ICD-10-CM

## 2020-09-30 PROCEDURE — 70486 CT MAXILLOFACIAL W/O DYE: CPT

## 2020-10-02 ENCOUNTER — OFFICE VISIT (OUTPATIENT)
Dept: PULMONOLOGY | Facility: HOSPICE | Age: 75
End: 2020-10-02
Payer: MEDICARE

## 2020-10-02 VITALS
HEIGHT: 63 IN | DIASTOLIC BLOOD PRESSURE: 64 MMHG | WEIGHT: 272 LBS | HEART RATE: 77 BPM | OXYGEN SATURATION: 93 % | SYSTOLIC BLOOD PRESSURE: 130 MMHG | BODY MASS INDEX: 48.2 KG/M2

## 2020-10-02 DIAGNOSIS — E66.01 MORBID OBESITY (HCC): ICD-10-CM

## 2020-10-02 DIAGNOSIS — R06.02 SHORTNESS OF BREATH: ICD-10-CM

## 2020-10-02 DIAGNOSIS — J45.40 MODERATE PERSISTENT ASTHMA, UNSPECIFIED WHETHER COMPLICATED: ICD-10-CM

## 2020-10-02 PROCEDURE — 99203 OFFICE O/P NEW LOW 30 MIN: CPT | Mod: 25 | Performed by: INTERNAL MEDICINE

## 2020-10-02 PROCEDURE — 94761 N-INVAS EAR/PLS OXIMETRY MLT: CPT | Performed by: INTERNAL MEDICINE

## 2020-10-02 RX ORDER — AMOXICILLIN 500 MG/1
TABLET, FILM COATED ORAL
COMMUNITY

## 2020-10-02 RX ORDER — FEXOFENADINE HCL 180 MG/1
180 TABLET ORAL DAILY
COMMUNITY

## 2020-10-02 RX ORDER — BENZONATATE 100 MG/1
100 CAPSULE ORAL 3 TIMES DAILY PRN
COMMUNITY

## 2020-10-02 RX ORDER — AZELASTINE HYDROCHLORIDE 137 UG/1
SPRAY, METERED NASAL
COMMUNITY
Start: 2020-09-03

## 2020-10-02 ASSESSMENT — FIBROSIS 4 INDEX: FIB4 SCORE: 1.51

## 2020-10-02 NOTE — PROGRESS NOTES
Chief Complaint   Patient presents with   • Establish Care     Referred by Dr Tinajero for Moderate persisent asthma, uncomplicated   • Other     PFT 07/06/20       Problems:   1. Moderate persistent asthma, unspecified whether complicated    2. Shortness of breath    3. Morbid obesity (HCC)        Assessment/Plan:   # Moderate persistent asthma  -- Clinical presentation consistent with moderate persistent asthma   -- Current ACT score -> 16, suggestive her asthma is not well controlled  -- Switch advair to trelegy to add LAMA as prior studies and meta-analyses have shown to decrease exacerbation and preserve spirometric function  -- Cont singulair  -- Cont albuterol as needed shortness of breath/wheezing   -- If no improvement then will check IgE level and CBC with differential to assess for future anti-IL-5 and omalizumab candidacy  -- Discussed about the importance of aggressively treating her sinobronchitis as nasal drip is known to have elevated IL-5 which can stimulates eosinophil colony formation, making her asthma worsen and harder to treat  -- Patient was coached and educated on the proper use of her inhaler and was advised to gargle and rinse after using trelegy   -- Discussed about the importance of using face mask while at work to prevent environmental exposure.  -- Due for flu vaccine when available   -- Cont ENT evaluation for sinus management    -- RTC 2 months    # Shortness of breath    -- Review of CXR showed no dense consolidation and PFTs supportive of airway reactive disease  -- Amb pulse oximetry indicated SpO2 remains above 88% on exertion   -- Differentials include decondition vs inadequate rx for airway reactive disease   -- Asthma rx as above   -- Referral to pulmonary rehab   -- Encourage patient to maintain at least 30 minutes a day of moderate to high intensity activity for a minimum of five days a week and to avoid two consecutive days without activity.     # Morbid obesity   -- Counseled  on low fat dietary which should focused on heart healthy eating patterns (include fibers, fruits, and whole grains) and to decrease consumption of salt, fat, and red processed meats. Physical activity emphasized to gradually increase aerobic activity as tolerated and to try to maintain at least 30 minutes per day.  -- Offered referral to nutrition consult but patient declined     RTC 8 weeks     HPI:  Cyndy Petit is a 74 year old female presents to establish care for asthma evaluation.   Her medical history is notable for asthma, chronic back pain, GERD, BEATRIZ on CPAP, and seasonal allergies. She was diagnosed with asthma as a childhood. She outgrew her asthma in her 20s and 30s. In 1976, she was diagnosed with recurrent asthma and has been manage with advair, albuterol inhaler/nebulizer. She was referred to Dr. Tinajero for allergies testing and referred here for asthma evaluation.     Subjectively, she reports having a cough which she feels like its from nasal drainage. Associated with shortness of breath on exertion and wheezing. Able to walk about 1/4 mile before she has to stop and rest. Denies fever/chills, chest pain, or N/V. She is currently being evaluated by Dr. Rojas (ENT) for sinus disease and may possible need surgery in the upcoming months. Currently on advair and albuterol. Uses albuterol about 1-2 times per day. Triggers include smoke and david brush.     Patient is a former smoker (~5 pack years), quit over 40 years ago. Family history significant for asthma.     Review of CXR personally 01/2020 -> no dense consolidation or pleural effusion.       Review of PFTs showed no evidence of airflow obstruction, modest improvement with bronchodilator, and significant air trapping and hyperinflation.     Review of CT maxillofacial showed mild mucosal thickening of ethmoid air cells.        Past Medical History:   Diagnosis Date   • Allergic rhinitis    • Apnea, sleep    • Arthritis    • Asthma    • Back  "pain    • Bronchiectasis (HCC)    • Bronchitis    • Carpal tunnel syndrome    • Chest pain    • Chest tightness    • Cold 4-8-2017    cold; taking antibiotics  4-   • Constipation    • Cough    • Daytime sleepiness    • Depression    • Difficulty breathing    • Difficulty swallowing    • DJD (degenerative joint disease)    • Frequent urination    • GERD (gastroesophageal reflux disease)    • GERD (gastroesophageal reflux disease)    • Hearing difficulty    • Heart burn    • Heartburn    • Hiatus hernia syndrome    • Hypertension    • Influenza    • Insomnia    • Lumps on the skin    • Nasal drainage    • Obesity    • Obstructive sleep apnea    • Pain     back, knees   • Painful joint    • Palpitations    • Pneumonia    • Pulmonary hypertension (HCC)    • Rash    • Rhinitis    • Shortness of breath    • Skin change    • Sleep apnea     CPAP   • Snoring    • Sore muscles    • Sore throat, chronic    • Sorethroat 2/09/2016   • Swelling of lower extremity    • Thrush     from \"Advair\", takes nystatin   • Tonsillitis    • Urinary incontinence    • Wears glasses    • Wheezing        Past Surgical History:   Procedure Laterality Date   • KNEE ARTHROPLASTY TOTAL Right 5/1/2017    Procedure: KNEE ARTHROPLASTY TOTAL;  Surgeon: Jesús Rutledge M.D.;  Location: [facility];  Service:    • KNEE ARTHROPLASTY TOTAL Left 3/7/2016    Procedure: KNEE ARTHROPLASTY TOTAL;  Surgeon: Jesús Rutledge M.D.;  Location: [facility];  Service:    • LUMBAR FUSION POSTERIOR  9/13/2013    Performed by Kaushal Ayala M.D. at SURGERY [facility]   • LUMBAR LAMINECTOMY DISKECTOMY  9/13/2013    Performed by Kaushal Ayala M.D. at [facility]   • LUMBAR LAMINECTOMY DISKECTOMY  5/30/2013    Performed by Kaushal Ayala M.D. at [facility]   • CHOLECYSTECTOMY  2010    laparoscopic   • OTHER NEUROLOGICAL SURG  2/2009    I&D of brain from sinus infection   • FORAMINOTOMY  11/26/08    " Performed by [facility] at SURGERY [facility]   • HARDWARE REMOVAL NEURO  08    Performed by [facility] at SURGERY [facility]   • KNEE ARTHROSCOPY Right    • LUMBAR FUSION POSTERIOR     • CARPAL TUNNEL RELEASE Right    • SHOULDER ARTHROTOMY Right    • ARTHROSCOPY, KNEE     • EYE SURGERY Bilateral     Cataract surgery 2018, 3/2018   • LAMINOTOMY     • PB REMV 2ND CATARACT,CORN-SCLER SECTN     • TONSILLECTOMY         ROS:   Constitutional: Denies fevers, chills, night sweats, fatigue or weight loss  Eyes: Denies vision loss, pain, drainage, double vision  Ears, Nose, Throat: Denies earache, tinnitus, hoarseness  Cardiovascular: Denies chest pain, tightness, palpitations  Respiratory: See HPI  GI: Denies abdominal pain, nausea, vomiting, diarrhea  : Denies frequent urination, hematuria, painful urination  Musculoskeletal: Denies back pain, painful joints, sore muscles  Neurological: Denies headaches, seizures  Skin: Denies rashes, color changes  Psychiatric: Denies depression or thoughts of suicide  Hematologic: Denies bleeding tendency or clotting tendency  Allergic/Immunologic: Denies rhinitis, skin sensitivity    Social History     Socioeconomic History   • Marital status:      Spouse name: Not on file   • Number of children: Not on file   • Years of education: Not on file   • Highest education level: Not on file   Occupational History   • Not on file   Social Needs   • Financial resource strain: Not on file   • Food insecurity     Worry: Not on file     Inability: Not on file   • Transportation needs     Medical: Not on file     Non-medical: Not on file   Tobacco Use   • Smoking status: Former Smoker     Packs/day: 1.00     Years: 6.00     Pack years: 6.00     Types: Cigarettes     Start date: 1972     Quit date: 1978     Years since quittin.7   • Smokeless tobacco: Never Used   • Tobacco comment:    Substance and Sexual Activity   • Alcohol  use: No     Alcohol/week: 0.0 oz   • Drug use: No   • Sexual activity: Not on file   Lifestyle   • Physical activity     Days per week: Not on file     Minutes per session: Not on file   • Stress: Not on file   Relationships   • Social connections     Talks on phone: Not on file     Gets together: Not on file     Attends Nondenominational service: Not on file     Active member of club or organization: Not on file     Attends meetings of clubs or organizations: Not on file     Relationship status: Not on file   • Intimate partner violence     Fear of current or ex partner: Not on file     Emotionally abused: Not on file     Physically abused: Not on file     Forced sexual activity: Not on file   Other Topics Concern   • Not on file   Social History Narrative    ** Merged History Encounter **          Allergy, Cefepime, Cephalosporins, Clarithromycin, Cleocin [clindamycin], Meropenem, Morphine, and Sulfamethoxazole-trimethoprim  Current Outpatient Medications on File Prior to Visit   Medication Sig Dispense Refill   • Azelastine HCl 137 MCG/SPRAY Solution USE 2 SPRAY(S) IN EACH NOSTRIL TWICE DAILY     • Amoxicillin 500 MG Tab Take  by mouth. Prior to dental     • fexofenadine (ALLEGRA) 180 MG tablet Take 180 mg by mouth every day.     • Docusate Calcium (STOOL SOFTENER PO) Take  by mouth 2 Times a Day.     • Multiple Vitamins-Minerals (PRESERVISION AREDS 2 PO) Take  by mouth 2 Times a Day.     • benzonatate (TESSALON) 100 MG Cap Take 100 mg by mouth 3 times a day as needed for Cough.     • DULoxetine (CYMBALTA) 30 MG Cap DR Particles TAKE 1 CAPSULE BY MOUTH ONCE DAILY TAKE WITH 60 MG DULOXETINE  2   • levoFLOXacin (LEVAQUIN) 500 MG tablet Take 500 mg by mouth.  0   • amLODIPine (NORVASC) 10 MG Tab TAKE 1 TABLET BY MOUTH ONCE DAILY. REPLACES 5 MG  2   • ipratropium-albuterol (DUONEB) 0.5-2.5 (3) MG/3ML nebulizer solution 3 mL by Nebulization route 4 times a day.     • gabapentin (NEURONTIN) 300 MG Cap Take 300 mg by mouth 4  "times a day.     • fluticasone-salmeterol (ADVAIR DISKUS) 500-50 MCG/DOSE AEROSOL POWDER, BREATH ACTIVATED Inhale 1 Puff by mouth every 12 hours.     • fluticasone (FLONASE) 50 MCG/ACT nasal spray Spray 1 Spray in nose every day.     • multivitamin (THERAGRAN) Tab Take 1 Tab by mouth every day.     • Multiple Vitamins-Minerals (HAIR SKIN AND NAILS FORMULA) Tab Take 1 Tab by mouth every day.     • hydrocodone-acetaminophen (NORCO) 5-325 MG Tab per tablet Take 1-2 Tabs by mouth every 6 hours as needed.     • duloxetine (CYMBALTA) 60 MG Cap DR Particles delayed-release capsule Take 60 mg by mouth every day.     • albuterol (PROAIR HFA) 108 (90 BASE) MCG/ACT AERS Inhale 2 Puffs by mouth every 6 hours as needed for Shortness of Breath. Shortness of breath     • losartan (COZAAR) 100 MG TABS Take 100 mg by mouth every day.     • nystatin (MYCOSTATIN) 211221 UNIT/ML SUSP Take 100,000 Units by mouth 5 Times a Day.     • montelukast (SINGULAIR) 10 MG TABS Take 10 mg by mouth every morning.     • trazodone (DESYREL) 50 MG TABS Take 50 mg by mouth every evening.     • omeprazole (PRILOSEC) 20 MG CPDR Take 20 mg by mouth 2 times a day.     • potassium chloride SA (KDUR) 20 MEQ Tab CR Take 1 Tab by mouth 2 Times a Day. (Patient not taking: Reported on 10/2/2020) 6 Tab 0     No current facility-administered medications on file prior to visit.      /64 (BP Location: Right arm, Patient Position: Sitting, BP Cuff Size: Adult)   Pulse 77   Ht 1.588 m (5' 2.5\")   Wt 123.4 kg (272 lb)   SpO2 93%   Family History   Problem Relation Age of Onset   • Heart Disease Father    • Arthritis Father    • Lung Disease Father         asthma   • Psychiatric Illness Father         depression   • Alcohol/Drug Father    • Depression Father    • Hypertension Mother    • Cancer Mother    • Heart Disease Mother    • Arthritis Mother    • Stroke Mother    • Hyperlipidemia Other    • Alcohol/Drug Brother    • Depression Daughter    • Drug abuse " Son         in remission   • Bipolar disorder Grandchild    • Genetic Disorder Neg Hx    • Diabetes Neg Hx      Immunization History   Administered Date(s) Administered   • Influenza Seasonal Injectable - Historical Data 01/04/2013, 09/11/2013, 10/01/2016   • Influenza TIV (IM) 09/11/2013, 10/01/2015, 10/01/2016   • Influenza Vaccine Adult HD 09/15/2011, 10/21/2014, 10/07/2015, 10/11/2016, 11/02/2017, 11/14/2018, 09/05/2019, 09/18/2020   • Influenza Vaccine Pediatric Split - Historical Data 10/14/2010   • Pneumococcal Conjugate Vaccine (Prevnar/PCV-13) 10/01/2015, 10/26/2015   • Pneumococcal polysaccharide vaccine (PPSV-23) 01/01/2009, 10/21/2014   • Tdap Vaccine 10/21/2014, 02/27/2020   • Zoster Vaccine Live (ZVL) (Zostavax) - HISTORICAL DATA 03/18/2014   • Zoster Vaccine Recombinant (RZV) (SHINGRIX) 09/18/2020         Physical Exam:  General: Obese, NAD, speaking in full sentence.    HEENT: PERRLA, EOMI, no scleral icterus, no nasal or oral lesions  Neck: No thyromegaly, no adenopathy, no bruits  Mallampatti: Grade III   Lungs: Equal breath sounds, no wheezes or crackles  Heart: Regular rate and rhythm, no gallops or murmurs  Abdomen: Soft, benign, no organomegaly  Extremities: No clubbing, cyanosis. Trace of pitting edema.   Neurologic: Cranial nerve, motor, and sensory exam are normal    Linda Rivero MD   Pulmonary Critical Care   [facility]

## 2020-10-02 NOTE — PROCEDURES
Multi-Ox Readings  Multi Ox #1 Room air   O2 sat % at rest 95   O2 sat % on exertion 93   O2 sat average on exertion     Multi Ox #2     O2 sat % at rest     O2 sat % on exertion     O2 sat average on exertion       Oxygen Use     Oxygen Frequency     Duration of need     Is the patient mobile within the home?     CPAP Use? 14   BIPAP Use?     Servo Titration

## 2020-10-28 ENCOUNTER — HOSPITAL ENCOUNTER (OUTPATIENT)
Dept: LAB | Facility: MEDICAL CENTER | Age: 75
End: 2020-10-28
Attending: FAMILY MEDICINE
Payer: MEDICARE

## 2020-10-28 LAB
ALBUMIN SERPL BCP-MCNC: 4.2 G/DL (ref 3.2–4.9)
ALBUMIN/GLOB SERPL: 1.4 G/DL
ALP SERPL-CCNC: 110 U/L (ref 30–99)
ALT SERPL-CCNC: 17 U/L (ref 2–50)
ANION GAP SERPL CALC-SCNC: 10 MMOL/L (ref 7–16)
AST SERPL-CCNC: 20 U/L (ref 12–45)
BASOPHILS # BLD AUTO: 0.9 % (ref 0–1.8)
BASOPHILS # BLD: 0.06 K/UL (ref 0–0.12)
BILIRUB SERPL-MCNC: 0.7 MG/DL (ref 0.1–1.5)
BUN SERPL-MCNC: 19 MG/DL (ref 8–22)
CALCIUM SERPL-MCNC: 9.2 MG/DL (ref 8.5–10.5)
CHLORIDE SERPL-SCNC: 104 MMOL/L (ref 96–112)
CHOLEST SERPL-MCNC: 168 MG/DL (ref 100–199)
CO2 SERPL-SCNC: 25 MMOL/L (ref 20–33)
CREAT SERPL-MCNC: 0.82 MG/DL (ref 0.5–1.4)
EOSINOPHIL # BLD AUTO: 0.36 K/UL (ref 0–0.51)
EOSINOPHIL NFR BLD: 5.2 % (ref 0–6.9)
ERYTHROCYTE [DISTWIDTH] IN BLOOD BY AUTOMATED COUNT: 48.8 FL (ref 35.9–50)
FASTING STATUS PATIENT QL REPORTED: NORMAL
GLOBULIN SER CALC-MCNC: 3.1 G/DL (ref 1.9–3.5)
GLUCOSE SERPL-MCNC: 122 MG/DL (ref 65–99)
HCT VFR BLD AUTO: 44 % (ref 37–47)
HDLC SERPL-MCNC: 54 MG/DL
HGB BLD-MCNC: 14.4 G/DL (ref 12–16)
IMM GRANULOCYTES # BLD AUTO: 0.02 K/UL (ref 0–0.11)
IMM GRANULOCYTES NFR BLD AUTO: 0.3 % (ref 0–0.9)
LDLC SERPL CALC-MCNC: 96 MG/DL
LYMPHOCYTES # BLD AUTO: 1.68 K/UL (ref 1–4.8)
LYMPHOCYTES NFR BLD: 24.3 % (ref 22–41)
MCH RBC QN AUTO: 30 PG (ref 27–33)
MCHC RBC AUTO-ENTMCNC: 32.7 G/DL (ref 33.6–35)
MCV RBC AUTO: 91.7 FL (ref 81.4–97.8)
MONOCYTES # BLD AUTO: 0.64 K/UL (ref 0–0.85)
MONOCYTES NFR BLD AUTO: 9.2 % (ref 0–13.4)
NEUTROPHILS # BLD AUTO: 4.16 K/UL (ref 2–7.15)
NEUTROPHILS NFR BLD: 60.1 % (ref 44–72)
NRBC # BLD AUTO: 0 K/UL
NRBC BLD-RTO: 0 /100 WBC
PLATELET # BLD AUTO: 274 K/UL (ref 164–446)
PMV BLD AUTO: 9.8 FL (ref 9–12.9)
POTASSIUM SERPL-SCNC: 3.9 MMOL/L (ref 3.6–5.5)
PROT SERPL-MCNC: 7.3 G/DL (ref 6–8.2)
RBC # BLD AUTO: 4.8 M/UL (ref 4.2–5.4)
SODIUM SERPL-SCNC: 139 MMOL/L (ref 135–145)
TRIGL SERPL-MCNC: 88 MG/DL (ref 0–149)
TSH SERPL DL<=0.005 MIU/L-ACNC: 1.61 UIU/ML (ref 0.38–5.33)
WBC # BLD AUTO: 6.9 K/UL (ref 4.8–10.8)

## 2020-10-28 PROCEDURE — 85025 COMPLETE CBC W/AUTO DIFF WBC: CPT

## 2020-10-28 PROCEDURE — 36415 COLL VENOUS BLD VENIPUNCTURE: CPT

## 2020-10-28 PROCEDURE — 80053 COMPREHEN METABOLIC PANEL: CPT

## 2020-10-28 PROCEDURE — 80061 LIPID PANEL: CPT

## 2020-10-28 PROCEDURE — 84443 ASSAY THYROID STIM HORMONE: CPT

## 2020-10-29 ENCOUNTER — HOSPITAL ENCOUNTER (OUTPATIENT)
Dept: PULMONOLOGY | Facility: MEDICAL CENTER | Age: 75
End: 2020-10-29
Attending: INTERNAL MEDICINE
Payer: MEDICARE

## 2020-10-29 PROCEDURE — 94618 PULMONARY STRESS TESTING: CPT

## 2020-10-29 PROCEDURE — 99211 OFF/OP EST MAY X REQ PHY/QHP: CPT

## 2020-10-29 PROCEDURE — G0237 THERAPEUTIC PROCD STRG ENDUR: HCPCS

## 2020-11-03 ENCOUNTER — HOSPITAL ENCOUNTER (OUTPATIENT)
Dept: PULMONOLOGY | Facility: MEDICAL CENTER | Age: 75
End: 2020-11-03
Attending: INTERNAL MEDICINE
Payer: MEDICARE

## 2020-11-03 PROCEDURE — G0237 THERAPEUTIC PROCD STRG ENDUR: HCPCS | Mod: XU

## 2020-11-03 PROCEDURE — G0238 OTH RESP PROC, INDIV: HCPCS | Mod: XU

## 2020-11-03 PROCEDURE — G0239 OTH RESP PROC, GROUP: HCPCS

## 2020-11-05 ENCOUNTER — HOSPITAL ENCOUNTER (OUTPATIENT)
Dept: PULMONOLOGY | Facility: MEDICAL CENTER | Age: 75
End: 2020-11-05
Attending: INTERNAL MEDICINE
Payer: MEDICARE

## 2020-11-05 PROCEDURE — G0239 OTH RESP PROC, GROUP: HCPCS

## 2020-11-05 PROCEDURE — G0237 THERAPEUTIC PROCD STRG ENDUR: HCPCS | Mod: XU

## 2020-11-05 PROCEDURE — G0238 OTH RESP PROC, INDIV: HCPCS | Mod: XU

## 2020-11-10 ENCOUNTER — HOSPITAL ENCOUNTER (OUTPATIENT)
Dept: PULMONOLOGY | Facility: MEDICAL CENTER | Age: 75
End: 2020-11-10
Attending: INTERNAL MEDICINE
Payer: MEDICARE

## 2020-11-10 PROCEDURE — G0237 THERAPEUTIC PROCD STRG ENDUR: HCPCS | Mod: XU

## 2020-11-10 PROCEDURE — G0239 OTH RESP PROC, GROUP: HCPCS

## 2020-11-10 PROCEDURE — G0238 OTH RESP PROC, INDIV: HCPCS | Mod: XU

## 2020-11-12 ENCOUNTER — APPOINTMENT (OUTPATIENT)
Dept: PULMONOLOGY | Facility: MEDICAL CENTER | Age: 75
End: 2020-11-12
Attending: INTERNAL MEDICINE
Payer: MEDICARE

## 2020-11-17 ENCOUNTER — APPOINTMENT (OUTPATIENT)
Dept: PULMONOLOGY | Facility: MEDICAL CENTER | Age: 75
End: 2020-11-17
Attending: INTERNAL MEDICINE
Payer: MEDICARE

## 2020-11-17 PROCEDURE — G0239 OTH RESP PROC, GROUP: HCPCS

## 2020-11-17 PROCEDURE — G0238 OTH RESP PROC, INDIV: HCPCS | Mod: XU

## 2020-11-17 PROCEDURE — G0237 THERAPEUTIC PROCD STRG ENDUR: HCPCS | Mod: XU

## 2020-11-19 ENCOUNTER — APPOINTMENT (OUTPATIENT)
Dept: PULMONOLOGY | Facility: MEDICAL CENTER | Age: 75
End: 2020-11-19
Attending: INTERNAL MEDICINE
Payer: MEDICARE

## 2020-11-19 PROCEDURE — G0237 THERAPEUTIC PROCD STRG ENDUR: HCPCS | Mod: XU

## 2020-11-19 PROCEDURE — G0238 OTH RESP PROC, INDIV: HCPCS | Mod: XU

## 2020-11-19 PROCEDURE — G0239 OTH RESP PROC, GROUP: HCPCS

## 2020-11-24 ENCOUNTER — APPOINTMENT (OUTPATIENT)
Dept: RADIOLOGY | Facility: MEDICAL CENTER | Age: 75
End: 2020-11-24
Attending: FAMILY MEDICINE
Payer: MEDICARE

## 2020-11-24 ENCOUNTER — APPOINTMENT (OUTPATIENT)
Dept: PULMONOLOGY | Facility: MEDICAL CENTER | Age: 75
End: 2020-11-24
Attending: INTERNAL MEDICINE
Payer: MEDICARE

## 2020-12-01 ENCOUNTER — APPOINTMENT (OUTPATIENT)
Dept: PULMONOLOGY | Facility: MEDICAL CENTER | Age: 75
End: 2020-12-01
Attending: INTERNAL MEDICINE
Payer: MEDICARE

## 2020-12-01 PROCEDURE — G0238 OTH RESP PROC, INDIV: HCPCS | Mod: XU

## 2020-12-01 PROCEDURE — G0239 OTH RESP PROC, GROUP: HCPCS

## 2020-12-01 PROCEDURE — G0237 THERAPEUTIC PROCD STRG ENDUR: HCPCS | Mod: XU

## 2020-12-03 ENCOUNTER — HOSPITAL ENCOUNTER (OUTPATIENT)
Dept: PULMONOLOGY | Facility: MEDICAL CENTER | Age: 75
End: 2020-12-03
Attending: INTERNAL MEDICINE
Payer: MEDICARE

## 2020-12-03 PROCEDURE — G0239 OTH RESP PROC, GROUP: HCPCS

## 2020-12-03 PROCEDURE — G0238 OTH RESP PROC, INDIV: HCPCS | Mod: XU

## 2020-12-03 PROCEDURE — G0237 THERAPEUTIC PROCD STRG ENDUR: HCPCS | Mod: XU

## 2020-12-08 ENCOUNTER — HOSPITAL ENCOUNTER (OUTPATIENT)
Dept: PULMONOLOGY | Facility: MEDICAL CENTER | Age: 75
End: 2020-12-08
Attending: INTERNAL MEDICINE
Payer: MEDICARE

## 2020-12-08 PROCEDURE — G0237 THERAPEUTIC PROCD STRG ENDUR: HCPCS | Mod: XU

## 2020-12-08 PROCEDURE — G0239 OTH RESP PROC, GROUP: HCPCS

## 2020-12-08 PROCEDURE — G0238 OTH RESP PROC, INDIV: HCPCS | Mod: XU

## 2020-12-10 ENCOUNTER — HOSPITAL ENCOUNTER (OUTPATIENT)
Dept: PULMONOLOGY | Facility: MEDICAL CENTER | Age: 75
End: 2020-12-10
Attending: INTERNAL MEDICINE
Payer: MEDICARE

## 2020-12-10 PROCEDURE — G0237 THERAPEUTIC PROCD STRG ENDUR: HCPCS | Mod: XU

## 2020-12-10 PROCEDURE — G0239 OTH RESP PROC, GROUP: HCPCS

## 2020-12-10 PROCEDURE — G0238 OTH RESP PROC, INDIV: HCPCS | Mod: XU

## 2020-12-15 ENCOUNTER — APPOINTMENT (OUTPATIENT)
Dept: PULMONOLOGY | Facility: MEDICAL CENTER | Age: 75
End: 2020-12-15
Attending: INTERNAL MEDICINE
Payer: MEDICARE

## 2020-12-15 PROCEDURE — G0424 PULMONARY REHAB W EXER: HCPCS

## 2020-12-17 ENCOUNTER — HOSPITAL ENCOUNTER (OUTPATIENT)
Dept: PULMONOLOGY | Facility: MEDICAL CENTER | Age: 75
End: 2020-12-17
Attending: INTERNAL MEDICINE
Payer: MEDICARE

## 2020-12-17 PROCEDURE — G0239 OTH RESP PROC, GROUP: HCPCS

## 2020-12-17 PROCEDURE — G0237 THERAPEUTIC PROCD STRG ENDUR: HCPCS | Mod: XU

## 2020-12-17 PROCEDURE — G0238 OTH RESP PROC, INDIV: HCPCS | Mod: XU

## 2020-12-22 ENCOUNTER — HOSPITAL ENCOUNTER (OUTPATIENT)
Dept: PULMONOLOGY | Facility: MEDICAL CENTER | Age: 75
End: 2020-12-22
Attending: INTERNAL MEDICINE
Payer: MEDICARE

## 2020-12-22 PROCEDURE — G0237 THERAPEUTIC PROCD STRG ENDUR: HCPCS | Mod: XU

## 2020-12-22 PROCEDURE — G0239 OTH RESP PROC, GROUP: HCPCS

## 2020-12-22 PROCEDURE — G0238 OTH RESP PROC, INDIV: HCPCS | Mod: XU

## 2020-12-24 ENCOUNTER — APPOINTMENT (OUTPATIENT)
Dept: PULMONOLOGY | Facility: MEDICAL CENTER | Age: 75
End: 2020-12-24
Attending: INTERNAL MEDICINE
Payer: MEDICARE

## 2020-12-29 ENCOUNTER — HOSPITAL ENCOUNTER (OUTPATIENT)
Dept: PULMONOLOGY | Facility: MEDICAL CENTER | Age: 75
End: 2020-12-29
Attending: INTERNAL MEDICINE
Payer: MEDICARE

## 2020-12-29 PROCEDURE — G0238 OTH RESP PROC, INDIV: HCPCS

## 2020-12-29 PROCEDURE — G0237 THERAPEUTIC PROCD STRG ENDUR: HCPCS

## 2020-12-29 PROCEDURE — G0239 OTH RESP PROC, GROUP: HCPCS

## 2020-12-31 ENCOUNTER — HOSPITAL ENCOUNTER (OUTPATIENT)
Dept: PULMONOLOGY | Facility: MEDICAL CENTER | Age: 75
End: 2020-12-31
Attending: INTERNAL MEDICINE
Payer: MEDICARE

## 2020-12-31 PROCEDURE — G0239 OTH RESP PROC, GROUP: HCPCS

## 2020-12-31 PROCEDURE — G0237 THERAPEUTIC PROCD STRG ENDUR: HCPCS | Mod: XU

## 2020-12-31 PROCEDURE — G0238 OTH RESP PROC, INDIV: HCPCS | Mod: XU

## 2021-01-05 ENCOUNTER — HOSPITAL ENCOUNTER (OUTPATIENT)
Dept: PULMONOLOGY | Facility: MEDICAL CENTER | Age: 76
End: 2021-01-05
Attending: INTERNAL MEDICINE
Payer: MEDICARE

## 2021-01-05 PROCEDURE — G0238 OTH RESP PROC, INDIV: HCPCS | Mod: XU

## 2021-01-05 PROCEDURE — G0239 OTH RESP PROC, GROUP: HCPCS

## 2021-01-05 PROCEDURE — G0237 THERAPEUTIC PROCD STRG ENDUR: HCPCS | Mod: XU

## 2021-01-07 ENCOUNTER — HOSPITAL ENCOUNTER (OUTPATIENT)
Dept: PULMONOLOGY | Facility: MEDICAL CENTER | Age: 76
End: 2021-01-07
Attending: INTERNAL MEDICINE
Payer: MEDICARE

## 2021-01-07 PROCEDURE — G0237 THERAPEUTIC PROCD STRG ENDUR: HCPCS | Mod: XU

## 2021-01-07 PROCEDURE — G0239 OTH RESP PROC, GROUP: HCPCS

## 2021-01-07 PROCEDURE — G0238 OTH RESP PROC, INDIV: HCPCS | Mod: XU

## 2021-01-08 DIAGNOSIS — Z23 NEED FOR VACCINATION: ICD-10-CM

## 2021-01-12 ENCOUNTER — HOSPITAL ENCOUNTER (OUTPATIENT)
Dept: PULMONOLOGY | Facility: MEDICAL CENTER | Age: 76
End: 2021-01-12
Attending: INTERNAL MEDICINE
Payer: MEDICARE

## 2021-01-12 PROCEDURE — G0237 THERAPEUTIC PROCD STRG ENDUR: HCPCS | Mod: XU

## 2021-01-12 PROCEDURE — G0239 OTH RESP PROC, GROUP: HCPCS

## 2021-01-12 PROCEDURE — G0238 OTH RESP PROC, INDIV: HCPCS | Mod: XU

## 2021-02-16 ENCOUNTER — IMMUNIZATION (OUTPATIENT)
Dept: FAMILY PLANNING/WOMEN'S HEALTH CLINIC | Facility: IMMUNIZATION CENTER | Age: 76
End: 2021-02-16
Attending: INTERNAL MEDICINE
Payer: MEDICARE

## 2021-02-16 DIAGNOSIS — Z23 NEED FOR VACCINATION: ICD-10-CM

## 2021-02-16 DIAGNOSIS — Z23 ENCOUNTER FOR VACCINATION: Primary | ICD-10-CM

## 2021-02-16 PROCEDURE — 0001A PFIZER SARS-COV-2 VACCINE: CPT

## 2021-02-16 PROCEDURE — 91300 PFIZER SARS-COV-2 VACCINE: CPT

## 2021-03-03 ENCOUNTER — OFFICE VISIT (OUTPATIENT)
Dept: SLEEP MEDICINE | Facility: MEDICAL CENTER | Age: 76
End: 2021-03-03
Payer: MEDICARE

## 2021-03-03 VITALS
HEIGHT: 63 IN | DIASTOLIC BLOOD PRESSURE: 86 MMHG | OXYGEN SATURATION: 94 % | WEIGHT: 273.19 LBS | BODY MASS INDEX: 48.41 KG/M2 | HEART RATE: 86 BPM | SYSTOLIC BLOOD PRESSURE: 144 MMHG

## 2021-03-03 DIAGNOSIS — G47.33 OSA (OBSTRUCTIVE SLEEP APNEA): ICD-10-CM

## 2021-03-03 DIAGNOSIS — J45.40 MODERATE PERSISTENT ASTHMA WITHOUT COMPLICATION: ICD-10-CM

## 2021-03-03 PROCEDURE — 99214 OFFICE O/P EST MOD 30 MIN: CPT | Performed by: INTERNAL MEDICINE

## 2021-03-03 ASSESSMENT — FIBROSIS 4 INDEX: FIB4 SCORE: 1.33

## 2021-03-03 NOTE — PROGRESS NOTES
"Chief Complaint   Patient presents with   • Follow-Up     BEATRIZ. Last seen 08/14/20     HPI: This patient is a 75 y.o. female presents for follow-up of obstructive sleep apnea and asthma.  She has mild BEATRIZ, AHI 12/h and titrated to CPAP: 16 cm of water.  Due to intolerance for CPAP pressures were reduced to 14 cm of water.  Compliance card shows 100% CPAP usage for 4.5 hours nightly.  Average AHI is 2.  CPAP machine recently broke in the humidifier and is no longer closing.  She has asthma, on Advair 500/50 mcg.  Last visit by Dr. Rivero had changed her Advair to Trelegy Ellipta.  She discontinued Trelegy as she felt it was \"too strong\" as well as expensive, and resumed Advair. SETH use 1x/ 2 weeks.  Denies cough, wheezing, chest tightness.  She has a history of environmental allergies and sinusitis, has seen Dr. Tinajero, allergist, and sees Dr. Rojas, ENT, who had prescribed nasal Pulmicort rinses.  Pulmonary function testing showed normal lung function with FEV1 2.19 L 111%.      Past Medical History:   Diagnosis Date   • Allergic rhinitis    • Apnea, sleep    • Arthritis    • Asthma    • Back pain    • Bronchiectasis (HCC)    • Bronchitis    • Carpal tunnel syndrome    • Chest pain    • Chest tightness    • Cold 4-8-2017    cold; taking antibiotics  4-   • Constipation    • Cough    • Daytime sleepiness    • Depression    • Difficulty breathing    • Difficulty swallowing    • DJD (degenerative joint disease)    • Frequent urination    • GERD (gastroesophageal reflux disease)    • GERD (gastroesophageal reflux disease)    • Hearing difficulty    • Heart burn    • Heartburn    • Hiatus hernia syndrome    • Hypertension    • Influenza    • Insomnia    • Lumps on the skin    • Nasal drainage    • Obesity    • Obstructive sleep apnea    • Pain     back, knees   • Painful joint    • Palpitations    • Pneumonia    • Pulmonary hypertension (HCC)    • Rash    • Rhinitis    • Shortness of breath    • Skin change    • " "Sleep apnea     CPAP   • Snoring    • Sore muscles    • Sore throat, chronic    • Sorethroat 2016   • Swelling of lower extremity    • Thrush     from \"Advair\", takes nystatin   • Tonsillitis    • Urinary incontinence    • Wears glasses    • Wheezing        Social History     Socioeconomic History   • Marital status:      Spouse name: Not on file   • Number of children: Not on file   • Years of education: Not on file   • Highest education level: Not on file   Occupational History   • Not on file   Tobacco Use   • Smoking status: Former Smoker     Packs/day: 1.00     Years: 6.00     Pack years: 6.00     Types: Cigarettes     Start date: 1972     Quit date: 1978     Years since quittin.1   • Smokeless tobacco: Never Used   • Tobacco comment:    Substance and Sexual Activity   • Alcohol use: No     Alcohol/week: 0.0 oz   • Drug use: No   • Sexual activity: Not on file   Other Topics Concern   • Not on file   Social History Narrative    ** Merged History Encounter **          Social Determinants of Health     Financial Resource Strain:    • Difficulty of Paying Living Expenses:    Food Insecurity:    • Worried About Running Out of Food in the Last Year:    • Ran Out of Food in the Last Year:    Transportation Needs:    • Lack of Transportation (Medical):    • Lack of Transportation (Non-Medical):    Physical Activity:    • Days of Exercise per Week:    • Minutes of Exercise per Session:    Stress:    • Feeling of Stress :    Social Connections:    • Frequency of Communication with Friends and Family:    • Frequency of Social Gatherings with Friends and Family:    • Attends Confucianism Services:    • Active Member of Clubs or Organizations:    • Attends Club or Organization Meetings:    • Marital Status:    Intimate Partner Violence:    • Fear of Current or Ex-Partner:    • Emotionally Abused:    • Physically Abused:    • Sexually Abused:        Family History   Problem Relation Age of Onset "   • Heart Disease Father    • Arthritis Father    • Lung Disease Father         asthma   • Psychiatric Illness Father         depression   • Alcohol/Drug Father    • Depression Father    • Hypertension Mother    • Cancer Mother    • Heart Disease Mother    • Arthritis Mother    • Stroke Mother    • Hyperlipidemia Other    • Alcohol/Drug Brother    • Depression Daughter    • Drug abuse Son         in remission   • Bipolar disorder Grandchild    • Genetic Disorder Neg Hx    • Diabetes Neg Hx        Current Outpatient Medications on File Prior to Visit   Medication Sig Dispense Refill   • Azelastine HCl 137 MCG/SPRAY Solution USE 2 SPRAY(S) IN EACH NOSTRIL TWICE DAILY     • Amoxicillin 500 MG Tab Take  by mouth. Prior to dental     • fexofenadine (ALLEGRA) 180 MG tablet Take 180 mg by mouth every day.     • Docusate Calcium (STOOL SOFTENER PO) Take  by mouth 2 Times a Day.     • Multiple Vitamins-Minerals (PRESERVISION AREDS 2 PO) Take  by mouth 2 Times a Day.     • benzonatate (TESSALON) 100 MG Cap Take 100 mg by mouth 3 times a day as needed for Cough.     • DULoxetine (CYMBALTA) 30 MG Cap DR Particles TAKE 1 CAPSULE BY MOUTH ONCE DAILY TAKE WITH 60 MG DULOXETINE  2   • levoFLOXacin (LEVAQUIN) 500 MG tablet Take 500 mg by mouth.  0   • amLODIPine (NORVASC) 10 MG Tab TAKE 1 TABLET BY MOUTH ONCE DAILY. REPLACES 5 MG  2   • ipratropium-albuterol (DUONEB) 0.5-2.5 (3) MG/3ML nebulizer solution 3 mL by Nebulization route 4 times a day.     • gabapentin (NEURONTIN) 300 MG Cap Take 300 mg by mouth 4 times a day.     • fluticasone-salmeterol (ADVAIR DISKUS) 500-50 MCG/DOSE AEROSOL POWDER, BREATH ACTIVATED Inhale 1 Puff by mouth every 12 hours.     • fluticasone (FLONASE) 50 MCG/ACT nasal spray Spray 1 Spray in nose every day.     • multivitamin (THERAGRAN) Tab Take 1 Tab by mouth every day.     • Multiple Vitamins-Minerals (HAIR SKIN AND NAILS FORMULA) Tab Take 1 Tab by mouth every day.     • hydrocodone-acetaminophen (NORCO)  5-325 MG Tab per tablet Take 1-2 Tabs by mouth every 6 hours as needed.     • duloxetine (CYMBALTA) 60 MG Cap DR Particles delayed-release capsule Take 60 mg by mouth every day.     • albuterol (PROAIR HFA) 108 (90 BASE) MCG/ACT AERS Inhale 2 Puffs by mouth every 6 hours as needed for Shortness of Breath. Shortness of breath     • losartan (COZAAR) 100 MG TABS Take 100 mg by mouth every day.     • nystatin (MYCOSTATIN) 957829 UNIT/ML SUSP Take 100,000 Units by mouth 5 Times a Day.     • montelukast (SINGULAIR) 10 MG TABS Take 10 mg by mouth every morning.     • trazodone (DESYREL) 50 MG TABS Take 50 mg by mouth every evening.     • omeprazole (PRILOSEC) 20 MG CPDR Take 20 mg by mouth 2 times a day.     • Fluticasone-Umeclidin-Vilant (TRELEGY ELLIPTA) 100-62.5-25 MCG/INH AEROSOL POWDER, BREATH ACTIVATED Inhale 1 Inhaler by mouth every day. (Patient not taking: Reported on 3/3/2021) 1 Each 0   • Fluticasone-Umeclidin-Vilant (TRELEGY ELLIPTA) 100-62.5-25 MCG/INH AEROSOL POWDER, BREATH ACTIVATED Inhale 1 Inhaler by mouth every day. (Patient not taking: Reported on 3/3/2021) 1 Each 6     No current facility-administered medications on file prior to visit.       Allergies: Allergy, Cefepime, Cephalosporins, Clarithromycin, Cleocin [clindamycin], Meropenem, Morphine, and Sulfamethoxazole-trimethoprim    ROS:   Constitutional: Denies fevers, chills, night sweats, fatigue or weight loss  Eyes: Denies vision loss, pain, drainage, double vision  Ears, Nose, Throat: Denies earache, difficulty hearing, tinnitus, nasal congestion, hoarseness  Cardiovascular: Denies chest pain, tightness, palpitations, orthopnea or edema  Respiratory: Denies shortness of breath, cough, wheezing, hemoptysis  Sleep: Denies daytime sleepiness, snoring, apneas, insomnia, morning headaches  GI: Denies heartburn, dysphagia, nausea, abdominal pain, diarrhea or constipation  : Denies frequent urination, hematuria, discharge or painful  "urination  Musculoskeletal: Denies back pain, painful joints, sore muscles  Neurological: Denies weakness or headaches  Skin: No rashes    /86 (BP Location: Right arm, Patient Position: Sitting, BP Cuff Size: Large adult)   Pulse 86   Ht 1.588 m (5' 2.5\")   Wt 124 kg (273 lb 3 oz)   SpO2 94%     Physical Exam:  Appearance: Well-nourished, well-developed, in no acute distress  HEENT: Normocephalic, atraumatic, white sclera, PERRLA, masked  Neck: No adenopathy or masses  Respiratory: no intercostal retractions or accessory muscle use  Lungs auscultation: Clear to auscultation bilaterally  Cardiovascular: Regular rate rhythm. No murmurs, rubs or gallops.  No LE edema  Abdomen: soft, nondistended  Gait: Normal  Digits: No clubbing, cyanosis  Motor: No focal deficits  Orientation: Oriented to time, person and place    Diagnosis:  1. BEATRIZ (obstructive sleep apnea)  DME CPAP   2. Moderate persistent asthma without complication         Plan:  Cyndy shows excellent compliance and response to CPAP therapy with normal AHI on treatment.  She is benefiting from CPAP use;  her CPAP machine recently broke and requires replacement.    Prescription sent for AutoPap:12-15 cm H20.  Asthma has been stable on Advair 500/50 mcg. She disliked Trelegy inhaler and resumed Advair.  Continue with treatment.  Continue albuterol HFA 1 to 2 puffs every 4 hours as needed.  Follow-up with ENT for chronic sinusitis.  Return in about 6 months (around 9/3/2021).      "

## 2021-03-06 ENCOUNTER — IMMUNIZATION (OUTPATIENT)
Dept: FAMILY PLANNING/WOMEN'S HEALTH CLINIC | Facility: IMMUNIZATION CENTER | Age: 76
End: 2021-03-06
Attending: INTERNAL MEDICINE
Payer: MEDICARE

## 2021-03-06 DIAGNOSIS — Z23 ENCOUNTER FOR VACCINATION: Primary | ICD-10-CM

## 2021-03-06 PROCEDURE — 0002A PFIZER SARS-COV-2 VACCINE: CPT | Performed by: NURSE PRACTITIONER

## 2021-03-06 PROCEDURE — 91300 PFIZER SARS-COV-2 VACCINE: CPT | Performed by: NURSE PRACTITIONER

## 2021-03-29 ENCOUNTER — PRE-ADMISSION TESTING (OUTPATIENT)
Dept: ADMISSIONS | Facility: MEDICAL CENTER | Age: 76
End: 2021-03-29
Attending: OTOLARYNGOLOGY
Payer: MEDICARE

## 2021-04-08 ENCOUNTER — PRE-ADMISSION TESTING (OUTPATIENT)
Dept: ADMISSIONS | Facility: MEDICAL CENTER | Age: 76
End: 2021-04-08
Attending: OTOLARYNGOLOGY
Payer: MEDICARE

## 2021-04-08 DIAGNOSIS — Z01.810 PRE-OPERATIVE CARDIOVASCULAR EXAMINATION: ICD-10-CM

## 2021-04-08 DIAGNOSIS — Z01.812 PRE-OPERATIVE LABORATORY EXAMINATION: ICD-10-CM

## 2021-04-08 LAB
ANION GAP SERPL CALC-SCNC: 8 MMOL/L (ref 7–16)
BUN SERPL-MCNC: 16 MG/DL (ref 8–22)
CALCIUM SERPL-MCNC: 8.9 MG/DL (ref 8.5–10.5)
CHLORIDE SERPL-SCNC: 104 MMOL/L (ref 96–112)
CO2 SERPL-SCNC: 25 MMOL/L (ref 20–33)
CREAT SERPL-MCNC: 0.98 MG/DL (ref 0.5–1.4)
EKG IMPRESSION: NORMAL
GLUCOSE SERPL-MCNC: 102 MG/DL (ref 65–99)
POTASSIUM SERPL-SCNC: 4.2 MMOL/L (ref 3.6–5.5)
SODIUM SERPL-SCNC: 137 MMOL/L (ref 135–145)

## 2021-04-08 PROCEDURE — 93010 ELECTROCARDIOGRAM REPORT: CPT | Performed by: INTERNAL MEDICINE

## 2021-04-08 PROCEDURE — 80048 BASIC METABOLIC PNL TOTAL CA: CPT

## 2021-04-08 PROCEDURE — 93005 ELECTROCARDIOGRAM TRACING: CPT

## 2021-04-08 PROCEDURE — 36415 COLL VENOUS BLD VENIPUNCTURE: CPT

## 2021-04-12 ENCOUNTER — PRE-ADMISSION TESTING (OUTPATIENT)
Dept: ADMISSIONS | Facility: MEDICAL CENTER | Age: 76
End: 2021-04-12
Attending: OTOLARYNGOLOGY
Payer: MEDICARE

## 2021-04-12 DIAGNOSIS — Z01.812 PRE-OPERATIVE LABORATORY EXAMINATION: ICD-10-CM

## 2021-04-12 LAB
COVID ORDER STATUS COVID19: NORMAL
SARS-COV-2 RNA RESP QL NAA+PROBE: NOTDETECTED
SPECIMEN SOURCE: NORMAL

## 2021-04-12 PROCEDURE — U0003 INFECTIOUS AGENT DETECTION BY NUCLEIC ACID (DNA OR RNA); SEVERE ACUTE RESPIRATORY SYNDROME CORONAVIRUS 2 (SARS-COV-2) (CORONAVIRUS DISEASE [COVID-19]), AMPLIFIED PROBE TECHNIQUE, MAKING USE OF HIGH THROUGHPUT TECHNOLOGIES AS DESCRIBED BY CMS-2020-01-R: HCPCS

## 2021-04-12 PROCEDURE — U0005 INFEC AGEN DETEC AMPLI PROBE: HCPCS

## 2021-04-12 PROCEDURE — C9803 HOPD COVID-19 SPEC COLLECT: HCPCS

## 2021-04-14 NOTE — OR NURSING
COVID-19 Pre-surgery screenin. Do you have an undiagnosed respiratory illness or symptoms such as coughing or sneezing? NO (Yes/No)  a. Onset of Sx -  b. Acute vs. chronic respiratory illness -    2. Do you have an unexplained fever greater than 100.4 degrees Fahrenheit or 38 degrees Celsius?     NO (Yes/No)    3. Have you had direct exposure to a patient who tested positive for Covid-19    NO (Yes/No)    4. Have you had any loss of your sense of taste or smell? Have you had N/V or sore throat? NO    Patient has been informed of visitor policy and asked to wear a mask upon entering the hospital   YES (Yes/No)

## 2021-04-15 ENCOUNTER — HOSPITAL ENCOUNTER (OUTPATIENT)
Facility: MEDICAL CENTER | Age: 76
End: 2021-04-15
Attending: OTOLARYNGOLOGY | Admitting: OTOLARYNGOLOGY
Payer: MEDICARE

## 2021-04-15 ENCOUNTER — ANESTHESIA EVENT (OUTPATIENT)
Dept: SURGERY | Facility: MEDICAL CENTER | Age: 76
End: 2021-04-15
Payer: MEDICARE

## 2021-04-15 ENCOUNTER — ANESTHESIA (OUTPATIENT)
Dept: SURGERY | Facility: MEDICAL CENTER | Age: 76
End: 2021-04-15
Payer: MEDICARE

## 2021-04-15 VITALS
BODY MASS INDEX: 48.64 KG/M2 | HEIGHT: 62 IN | TEMPERATURE: 97 F | OXYGEN SATURATION: 95 % | DIASTOLIC BLOOD PRESSURE: 70 MMHG | HEART RATE: 94 BPM | SYSTOLIC BLOOD PRESSURE: 159 MMHG | WEIGHT: 264.33 LBS | RESPIRATION RATE: 18 BRPM

## 2021-04-15 LAB — PATHOLOGY CONSULT NOTE: NORMAL

## 2021-04-15 PROCEDURE — 160025 RECOVERY II MINUTES (STATS): Performed by: OTOLARYNGOLOGY

## 2021-04-15 PROCEDURE — 88305 TISSUE EXAM BY PATHOLOGIST: CPT | Mod: 59

## 2021-04-15 PROCEDURE — 700111 HCHG RX REV CODE 636 W/ 250 OVERRIDE (IP): Performed by: ANESTHESIOLOGY

## 2021-04-15 PROCEDURE — 502573 HCHG PACK, ENT: Performed by: OTOLARYNGOLOGY

## 2021-04-15 PROCEDURE — C1894 INTRO/SHEATH, NON-LASER: HCPCS | Performed by: OTOLARYNGOLOGY

## 2021-04-15 PROCEDURE — 700102 HCHG RX REV CODE 250 W/ 637 OVERRIDE(OP)

## 2021-04-15 PROCEDURE — 700111 HCHG RX REV CODE 636 W/ 250 OVERRIDE (IP): Performed by: OTOLARYNGOLOGY

## 2021-04-15 PROCEDURE — A9270 NON-COVERED ITEM OR SERVICE: HCPCS | Performed by: OTOLARYNGOLOGY

## 2021-04-15 PROCEDURE — 88311 DECALCIFY TISSUE: CPT

## 2021-04-15 PROCEDURE — 700101 HCHG RX REV CODE 250: Performed by: OTOLARYNGOLOGY

## 2021-04-15 PROCEDURE — A9270 NON-COVERED ITEM OR SERVICE: HCPCS

## 2021-04-15 PROCEDURE — 160009 HCHG ANES TIME/MIN: Performed by: OTOLARYNGOLOGY

## 2021-04-15 PROCEDURE — 700101 HCHG RX REV CODE 250: Performed by: ANESTHESIOLOGY

## 2021-04-15 PROCEDURE — 160046 HCHG PACU - 1ST 60 MINS PHASE II: Performed by: OTOLARYNGOLOGY

## 2021-04-15 PROCEDURE — 500331 HCHG COTTONOID, SURG PATTIE: Performed by: OTOLARYNGOLOGY

## 2021-04-15 PROCEDURE — 700105 HCHG RX REV CODE 258: Performed by: OTOLARYNGOLOGY

## 2021-04-15 PROCEDURE — 160002 HCHG RECOVERY MINUTES (STAT): Performed by: OTOLARYNGOLOGY

## 2021-04-15 PROCEDURE — 160035 HCHG PACU - 1ST 60 MINS PHASE I: Performed by: OTOLARYNGOLOGY

## 2021-04-15 PROCEDURE — 501838 HCHG SUTURE GENERAL: Performed by: OTOLARYNGOLOGY

## 2021-04-15 PROCEDURE — 160029 HCHG SURGERY MINUTES - 1ST 30 MINS LEVEL 4: Performed by: OTOLARYNGOLOGY

## 2021-04-15 PROCEDURE — 160048 HCHG OR STATISTICAL LEVEL 1-5: Performed by: OTOLARYNGOLOGY

## 2021-04-15 PROCEDURE — 160036 HCHG PACU - EA ADDL 30 MINS PHASE I: Performed by: OTOLARYNGOLOGY

## 2021-04-15 PROCEDURE — 160041 HCHG SURGERY MINUTES - EA ADDL 1 MIN LEVEL 4: Performed by: OTOLARYNGOLOGY

## 2021-04-15 DEVICE — SPLINT INTRANASAL STERILE POSISEP X 0.6IN X 2.0IN (5EA/PK): Type: IMPLANTABLE DEVICE | Site: NOSE | Status: FUNCTIONAL

## 2021-04-15 RX ORDER — HYDRALAZINE HYDROCHLORIDE 20 MG/ML
5 INJECTION INTRAMUSCULAR; INTRAVENOUS
Status: DISCONTINUED | OUTPATIENT
Start: 2021-04-15 | End: 2021-04-15 | Stop reason: HOSPADM

## 2021-04-15 RX ORDER — EPINEPHRINE 1 MG/ML(1)
AMPUL (ML) INJECTION
Status: DISCONTINUED | OUTPATIENT
Start: 2021-04-15 | End: 2021-04-15 | Stop reason: HOSPADM

## 2021-04-15 RX ORDER — EPINEPHRINE 1 MG/ML(1)
AMPUL (ML) INJECTION
Status: DISCONTINUED
Start: 2021-04-15 | End: 2021-04-15 | Stop reason: HOSPADM

## 2021-04-15 RX ORDER — LABETALOL HYDROCHLORIDE 5 MG/ML
5 INJECTION, SOLUTION INTRAVENOUS
Status: DISCONTINUED | OUTPATIENT
Start: 2021-04-15 | End: 2021-04-15 | Stop reason: HOSPADM

## 2021-04-15 RX ORDER — HYDROMORPHONE HYDROCHLORIDE 1 MG/ML
0.4 INJECTION, SOLUTION INTRAMUSCULAR; INTRAVENOUS; SUBCUTANEOUS
Status: DISCONTINUED | OUTPATIENT
Start: 2021-04-15 | End: 2021-04-15 | Stop reason: HOSPADM

## 2021-04-15 RX ORDER — AMPICILLIN AND SULBACTAM 2; 1 G/1; G/1
INJECTION, POWDER, FOR SOLUTION INTRAMUSCULAR; INTRAVENOUS PRN
Status: DISCONTINUED | OUTPATIENT
Start: 2021-04-15 | End: 2021-04-15 | Stop reason: SURG

## 2021-04-15 RX ORDER — ONDANSETRON 2 MG/ML
4 INJECTION INTRAMUSCULAR; INTRAVENOUS
Status: DISCONTINUED | OUTPATIENT
Start: 2021-04-15 | End: 2021-04-15 | Stop reason: HOSPADM

## 2021-04-15 RX ORDER — LIDOCAINE HYDROCHLORIDE AND EPINEPHRINE 10; 10 MG/ML; UG/ML
INJECTION, SOLUTION INFILTRATION; PERINEURAL
Status: DISCONTINUED | OUTPATIENT
Start: 2021-04-15 | End: 2021-04-15 | Stop reason: HOSPADM

## 2021-04-15 RX ORDER — HYDROMORPHONE HYDROCHLORIDE 1 MG/ML
0.1 INJECTION, SOLUTION INTRAMUSCULAR; INTRAVENOUS; SUBCUTANEOUS
Status: DISCONTINUED | OUTPATIENT
Start: 2021-04-15 | End: 2021-04-15 | Stop reason: HOSPADM

## 2021-04-15 RX ORDER — ONDANSETRON 2 MG/ML
INJECTION INTRAMUSCULAR; INTRAVENOUS PRN
Status: DISCONTINUED | OUTPATIENT
Start: 2021-04-15 | End: 2021-04-15 | Stop reason: SURG

## 2021-04-15 RX ORDER — DEXAMETHASONE SODIUM PHOSPHATE 4 MG/ML
INJECTION, SOLUTION INTRA-ARTICULAR; INTRALESIONAL; INTRAMUSCULAR; INTRAVENOUS; SOFT TISSUE PRN
Status: DISCONTINUED | OUTPATIENT
Start: 2021-04-15 | End: 2021-04-15 | Stop reason: SURG

## 2021-04-15 RX ORDER — DIPHENHYDRAMINE HYDROCHLORIDE 50 MG/ML
12.5 INJECTION INTRAMUSCULAR; INTRAVENOUS
Status: DISCONTINUED | OUTPATIENT
Start: 2021-04-15 | End: 2021-04-15 | Stop reason: HOSPADM

## 2021-04-15 RX ORDER — SUCCINYLCHOLINE/SOD CL,ISO/PF 200MG/10ML
SYRINGE (ML) INTRAVENOUS PRN
Status: DISCONTINUED | OUTPATIENT
Start: 2021-04-15 | End: 2021-04-15 | Stop reason: SURG

## 2021-04-15 RX ORDER — GINSENG 100 MG
CAPSULE ORAL
Status: DISCONTINUED | OUTPATIENT
Start: 2021-04-15 | End: 2021-04-15 | Stop reason: HOSPADM

## 2021-04-15 RX ORDER — SODIUM CHLORIDE, SODIUM LACTATE, POTASSIUM CHLORIDE, CALCIUM CHLORIDE 600; 310; 30; 20 MG/100ML; MG/100ML; MG/100ML; MG/100ML
INJECTION, SOLUTION INTRAVENOUS CONTINUOUS
Status: ACTIVE | OUTPATIENT
Start: 2021-04-15 | End: 2021-04-15

## 2021-04-15 RX ORDER — HALOPERIDOL 5 MG/ML
1 INJECTION INTRAMUSCULAR
Status: DISCONTINUED | OUTPATIENT
Start: 2021-04-15 | End: 2021-04-15 | Stop reason: HOSPADM

## 2021-04-15 RX ORDER — BACITRACIN ZINC 500 [USP'U]/G
OINTMENT TOPICAL
Status: DISCONTINUED
Start: 2021-04-15 | End: 2021-04-15 | Stop reason: HOSPADM

## 2021-04-15 RX ORDER — EPINEPHRINE 1 MG/ML
INJECTION INTRAMUSCULAR; INTRAVENOUS; SUBCUTANEOUS
Status: DISCONTINUED
Start: 2021-04-15 | End: 2021-04-15 | Stop reason: HOSPADM

## 2021-04-15 RX ORDER — HYDROMORPHONE HYDROCHLORIDE 1 MG/ML
0.2 INJECTION, SOLUTION INTRAMUSCULAR; INTRAVENOUS; SUBCUTANEOUS
Status: DISCONTINUED | OUTPATIENT
Start: 2021-04-15 | End: 2021-04-15 | Stop reason: HOSPADM

## 2021-04-15 RX ORDER — MEPERIDINE HYDROCHLORIDE 25 MG/ML
6.25 INJECTION INTRAMUSCULAR; INTRAVENOUS; SUBCUTANEOUS
Status: DISCONTINUED | OUTPATIENT
Start: 2021-04-15 | End: 2021-04-15 | Stop reason: HOSPADM

## 2021-04-15 RX ORDER — LIDOCAINE HYDROCHLORIDE 10 MG/ML
INJECTION, SOLUTION INFILTRATION; PERINEURAL
Status: DISCONTINUED
Start: 2021-04-15 | End: 2021-04-15 | Stop reason: HOSPADM

## 2021-04-15 RX ORDER — AMPICILLIN AND SULBACTAM 2; 1 G/1; G/1
3 INJECTION, POWDER, FOR SOLUTION INTRAMUSCULAR; INTRAVENOUS
Status: DISCONTINUED | OUTPATIENT
Start: 2021-04-15 | End: 2021-04-15

## 2021-04-15 RX ORDER — LIDOCAINE HYDROCHLORIDE 20 MG/ML
INJECTION, SOLUTION EPIDURAL; INFILTRATION; INTRACAUDAL; PERINEURAL PRN
Status: DISCONTINUED | OUTPATIENT
Start: 2021-04-15 | End: 2021-04-15 | Stop reason: SURG

## 2021-04-15 RX ORDER — ROCURONIUM BROMIDE 10 MG/ML
INJECTION, SOLUTION INTRAVENOUS PRN
Status: DISCONTINUED | OUTPATIENT
Start: 2021-04-15 | End: 2021-04-15 | Stop reason: SURG

## 2021-04-15 RX ADMIN — ONDANSETRON 4 MG: 2 INJECTION INTRAMUSCULAR; INTRAVENOUS at 09:32

## 2021-04-15 RX ADMIN — FENTANYL CITRATE 50 MCG: 50 INJECTION, SOLUTION INTRAMUSCULAR; INTRAVENOUS at 07:59

## 2021-04-15 RX ADMIN — AMPICILLIN SODIUM AND SULBACTAM SODIUM 3 G: 2; 1 INJECTION, POWDER, FOR SOLUTION INTRAMUSCULAR; INTRAVENOUS at 08:08

## 2021-04-15 RX ADMIN — ROCURONIUM BROMIDE 10 MG: 10 INJECTION, SOLUTION INTRAVENOUS at 07:59

## 2021-04-15 RX ADMIN — LIDOCAINE HYDROCHLORIDE 20 MG: 20 INJECTION, SOLUTION EPIDURAL; INFILTRATION; INTRACAUDAL at 07:59

## 2021-04-15 RX ADMIN — DEXAMETHASONE SODIUM PHOSPHATE 4 MG: 4 INJECTION, SOLUTION INTRA-ARTICULAR; INTRALESIONAL; INTRAMUSCULAR; INTRAVENOUS; SOFT TISSUE at 08:06

## 2021-04-15 RX ADMIN — ROCURONIUM BROMIDE 30 MG: 10 INJECTION, SOLUTION INTRAVENOUS at 08:50

## 2021-04-15 RX ADMIN — SUGAMMADEX 200 MG: 100 INJECTION, SOLUTION INTRAVENOUS at 09:40

## 2021-04-15 RX ADMIN — POVIDONE IODINE 15 ML: 100 SOLUTION TOPICAL at 07:10

## 2021-04-15 RX ADMIN — FENTANYL CITRATE 25 MCG: 50 INJECTION, SOLUTION INTRAMUSCULAR; INTRAVENOUS at 10:14

## 2021-04-15 RX ADMIN — ROCURONIUM BROMIDE 40 MG: 10 INJECTION, SOLUTION INTRAVENOUS at 08:08

## 2021-04-15 RX ADMIN — PROPOFOL 150 MG: 10 INJECTION, EMULSION INTRAVENOUS at 07:59

## 2021-04-15 RX ADMIN — FENTANYL CITRATE 50 MCG: 50 INJECTION, SOLUTION INTRAMUSCULAR; INTRAVENOUS at 07:46

## 2021-04-15 RX ADMIN — SODIUM CHLORIDE, POTASSIUM CHLORIDE, SODIUM LACTATE AND CALCIUM CHLORIDE: 600; 310; 30; 20 INJECTION, SOLUTION INTRAVENOUS at 07:10

## 2021-04-15 RX ADMIN — Medication 200 MG: at 08:00

## 2021-04-15 ASSESSMENT — PAIN DESCRIPTION - PAIN TYPE
TYPE: SURGICAL PAIN

## 2021-04-15 ASSESSMENT — PAIN SCALES - GENERAL: PAIN_LEVEL: 2

## 2021-04-15 ASSESSMENT — FIBROSIS 4 INDEX: FIB4 SCORE: 1.33

## 2021-04-15 NOTE — OP REPORT
DATE OF SERVICE:  04/15/2021        PREOPERATIVE DIAGNOSIS:  Chronic sinusitis.     POSTOPERATIVE DIAGNOSES:  Chronic sinusitis with polyps.     PROCEDURES:  1.  Bilateral endoscopic maxillary antrostomy.  2.  Bilateral endoscopic total ethmoidectomy.  3.  Bilateral endoscopic sphenoidotomy.  4.  Left sony bullosa resection.  5.  With image guidance.     SURGEON:  Yon oRjas MD     ASSISTANT:  None.     ANESTHESIA:  General endotracheal.     ANESTHESIOLOGIST:  Jessica Flores MD     ESTIMATED BLOOD LOSS:  25 mL.     SPECIMENS:  Right and left sinus contents.     INDICATIONS:  This is a 75-year-old female who has had drainage from her nose.    CT scan revealed bilateral ethmoidal and maxillary opacifications.  After   discussion of the risks, benefits and alternatives, she elected to undergo the   above procedure.     FINDINGS:  There were small polyps in the ethmoids.  She did not have   frontals.  There was a left sony bullosa.  There was a mucocele along the   right orbit that was opened.     DESCRIPTION OF PROCEDURE:  The patient was brought to the operating room,   intubated by anesthesia, turned 90 degrees, draped in usual sterile fashion.    Timeout was performed.  A 1:100,000 epinephrine soaked pledgets stained with   fluorescein were placed in the nasal cavity.  Once vasoconstriction was   allowed to take effect, I injected the right and left maxillary lines,   sphenopalatine region with 1% lidocaine with 1:100,000 epinephrine.  I placed   pledgets again, registered the image guidance, it was found to be in good   fidelity in all three planes as required for this case because of the   involvement of the sphenoid and polyps.  I began sinus surgery on the right   hand side.  I resected the uncinate with pediatric backbiter, microdebrider   and Kerrisons.  I used a 30-degree endoscope.  I identified the natural   ostium.  I widened it posteriorly using a scissor, side biter and the    microdebrider.  I switched back to the 0.  I entered the bulla and resected   with a spoon and microdebrider and went through the basal lamella at the   intersection of horizontal and vertical portions.  I visualized the superior   turbinate.  I entered the posterior ethmoid cells.  I visualized the orbit and   skull base.  I resected the inferior aspect of the superior turbinate.  I   identified the natural ostium of the sphenoid.  I widened with a flat right   angle spoon, Kerrisons and the microdebrider.  I then cleared partitions in a   posterior to anterior manner.  I used a 45-degree endoscope to explore the   superior ethmoids, anterior ethmoids and I was able to use curved   through-cutting instruments to resect the partitions within this.  I then   turned my attention to the left hand side, there was a sony bullosa.  I   resected the lateral lamella of this with a sickle knife and a straight   scissor.  I then used a 30-degree endoscope.  I resected the uncinate with   pediatric backbiter, microdebrider and Kerrisons.  I identified the natural   ostium.  I widened posteriorly using a curved scissor, side biter and the   microdebrider.  I then switched back to the 0-degree endoscope.  I ended up   entering the bulla and resected with spoon and microdebrider and went through   the basal lamella at the intersection of horizontal and vertical portions.  I   visualized the superior turbinate.  I entered the posterior ethmoid cells.  I   resected the inferior aspect of the superior turbinate.  I identified the   natural ostium of the sphenoid.  I widened it with a flat right angle spoon,   Kerrisons and the microdebrider.  I then cleared partitions in a posterior to   anterior manner.  I used a 45-degree endoscope to explore the anterior   superior ethmoids and resect the partitions with through-cutting instruments   that were curved.  She did not have frontal, so I did not explore them.  At   this point,  hemostasis was good.  I did use the suction cautery just inferior   to the sphenoidotomy to cauterize any sort of posterior nasal branch that may   ooze.  I then placed a trimmed piece of PosiSep in each middle meatus and   inflated with saline.  I then placed a bacitracin-coated finger cot in each   middle meatus and secured into each other.  The patient tolerated the   procedure well.        ______________________________  MD JOSE Vizcaino/BÁRBARA/JESI    DD:  04/15/2021 09:47  DT:  04/15/2021 11:09    Job#:  [id number]

## 2021-04-15 NOTE — ANESTHESIA TIME REPORT
Anesthesia Start and Stop Event Times     Date Time Event    4/15/2021 07:26 AM Ready for Procedure    4/15/2021 07:46 AM Anesthesia Start        Responsible Staff  04/15/21    Name Role Begin End    Jessica Flores M.D. Anesthesiologist 04/15/21 07:46 AM Not recorded        Preop Diagnosis (Free Text):  Pre-op Diagnosis     chronic maxillary sinusitis        Preop Diagnosis (Codes):    Post op Diagnosis  Chronic sinusitis      Premium Reason  Non-Premium    Comments:

## 2021-04-15 NOTE — DISCHARGE INSTRUCTIONS
ACTIVITY: Rest and take it easy for the first 24 hours.  A responsible adult is recommended to remain with you during that time.  It is normal to feel sleepy.  We encourage you to not do anything that requires balance, judgment or coordination.    MILD FLU-LIKE SYMPTOMS ARE NORMAL. YOU MAY EXPERIENCE GENERALIZED MUSCLE ACHES, THROAT IRRITATION, HEADACHE AND/OR SOME NAUSEA.    FOR 24 HOURS DO NOT:  Drive, operate machinery or run household appliances.  Drink beer or alcoholic beverages.   Make important decisions or sign legal documents.    SPECIAL INSTRUCTIONS: See Handout    DIET: To avoid nausea, slowly advance diet as tolerated, avoiding spicy or greasy foods for the first day.  Add more substantial food to your diet according to your physician's instructions.  Babies can be fed formula or breast milk as soon as they are hungry.  INCREASE FLUIDS AND FIBER TO AVOID CONSTIPATION.    SURGICAL DRESSING/BATHING: Ok to shower tomorrow, do not submerge head in water.  Avoid hot/steamy showers as this can cause bleeding    FOLLOW-UP APPOINTMENT:  A follow-up appointment should be arranged with Dr. Rojas [phone]; call to schedule.    You should CALL YOUR PHYSICIAN if you develop:  Fever greater than 101 degrees F.  Pain not relieved by medication, or persistent nausea or vomiting.  Excessive bleeding (blood soaking through dressing) or unexpected drainage from the wound.  Extreme redness or swelling around the incision site, drainage of pus or foul smelling drainage.  Inability to urinate or empty your bladder within 8 hours.  Problems with breathing or chest pain.    You should call 911 if you develop problems with breathing or chest pain.  If you are unable to contact your doctor or surgical center, you should go to the nearest emergency room or urgent care center.    Physician's telephone #: Dr. Rojas [phone]    If any questions arise, call your doctor.  If your doctor is not available, please feel free to  call the Surgical Center at [phone]. The Contact Center is open Monday through Friday 7AM to 5PM and may speak to a nurse at [phone], or toll free at [phone].     A registered nurse may call you a few days after your surgery to see how you are doing after your procedure.    MEDICATIONS: Resume taking daily medication.  Take prescribed pain medication with food.  If no medication is prescribed, you may take non-aspirin pain medication if needed.  PAIN MEDICATION CAN BE VERY CONSTIPATING.  Take a stool softener or laxative such as senokot, pericolace, or milk of magnesia if needed.    Last pain medication given at 1019 fentanyl.    If your physician has prescribed pain medication that includes Acetaminophen (Tylenol), do not take additional Acetaminophen (Tylenol) while taking the prescribed medication.    Depression / Suicide Risk    As you are discharged from this [facility] facility, it is important to learn how to keep safe from harming yourself.    Recognize the warning signs:  · Abrupt changes in personality, positive or negative- including increase in energy   · Giving away possessions  · Change in eating patterns- significant weight changes-  positive or negative  · Change in sleeping patterns- unable to sleep or sleeping all the time   · Unwillingness or inability to communicate  · Depression  · Unusual sadness, discouragement and loneliness  · Talk of wanting to die  · Neglect of personal appearance   · Rebelliousness- reckless behavior  · Withdrawal from people/activities they love  · Confusion- inability to concentrate     If you or a loved one observes any of these behaviors or has concerns about self-harm, here's what you can do:  · Talk about it- your feelings and reasons for harming yourself  · Remove any means that you might use to hurt yourself (examples: pills, rope, extension cords, firearm)  · Get professional help from the community (Mental Health, Substance Abuse,  psychological counseling)  · Do not be alone:Call your Safe Contact- someone whom you trust who will be there for you.  · Call your local CRISIS HOTLINE 901-5709 or 714-544-5501  · Call your local Children's Mobile Crisis Response Team [place] (094) 109-9808 or www.[employer]  · Call the toll free National Suicide Prevention Hotlines   · National Suicide Prevention Lifeline 599-918-ZCAP (8424)  · National Hope Line Network 800-SUICIDE (838-5986)

## 2021-04-15 NOTE — ANESTHESIA POSTPROCEDURE EVALUATION
Patient: Cyndy Petit    Procedure Summary     Date: 04/15/21 Room / Location: [facility] ROOM 22 / SURGERY SAME DAY [facility]    Anesthesia Start: 0746 Anesthesia Stop: 0950    Procedures:       ENDOSCOPY, PARANASAL SINUS, WITH TOTAL ETHMOIDECTOMY, SPHENOIDOTOMY, MAXILLARY ANTROSTOMY, MAXILLARY SINUS TISS REM, AND EXPLOR FRONT SINUS (Bilateral Nose)      STEREOTACTIC COMPUTER ASSISTED PROCEDURE CRANIAL,EXTRADURAL (Nose)      SEPTOPLASTY, NOSE (Nose)      SINUSOTOMY, FRONTAL SINUS, USING TREPHINE-TYPE DEVICE - ENDOSCOPIC. (Nose) Diagnosis: (chronic maxillary sinusitis )    Surgeons: Yon Rojas M.D. Responsible Provider: Jessica Flores M.D.    Anesthesia Type: general ASA Status: 3          Final Anesthesia Type: general  Last vitals  BP   Blood Pressure : 135/50    Temp   36.1 °C (97 °F)    Pulse   97   Resp   18    SpO2   95 %      Anesthesia Post Evaluation    Patient location during evaluation: PACU  Patient participation: complete - patient participated  Level of consciousness: awake and alert  Pain score: 2    Airway patency: patent  Anesthetic complications: no  Cardiovascular status: hemodynamically stable  Respiratory status: acceptable  Hydration status: euvolemic    PONV: none          No complications documented.     Nurse Pain Score: 2 (NPRS)

## 2021-04-15 NOTE — OR SURGEON
Immediate Post OP Note    PreOp Diagnosis: crs    PostOp Diagnosis: crs with polyps     Procedure(s):  1. B max  2. B total ethmoid  3. B sphenoid  4. L sony  5. With IGS     Surgeon(s):  Yon Rojas M.D.    Anesthesiologist/Type of Anesthesia:  Anesthesiologist: Jessica Flores M.D./General    Surgical Staff:  Circulator: Lola Callahan R.N.  Relief Circulator: Marsha Peralta R.N.  Relief Scrub: Tony Brandt  Scrub Person: Elodia Pickard    Specimens removed if any:  ID Type Source Tests Collected by Time Destination   A : left sinus contents Tissue Sinus PATHOLOGY SPECIMEN Yon Rojas M.D. 4/15/2021  8:33 AM    B : right sinus contents Tissue Sinus PATHOLOGY SPECIMEN Yon Rojas M.D. 4/15/2021  8:33 AM        Estimated Blood Loss: 25cc    Findings: small polyps    Complications: none        4/15/2021 9:41 AM Yon Rojas M.D.

## 2021-04-15 NOTE — ANESTHESIA PREPROCEDURE EVALUATION
75 year old female with multiple medical problems morbid obesity HTN, Sleep apnea, see list     Relevant Problems   ANESTHESIA   (+) BEATRIZ (obstructive sleep apnea)      PULMONARY   (+) Moderate persistent asthma without complication      CARDIAC   (+) HTN (hypertension)   (+) Hypertensive urgency      GI   (+) GERD (gastroesophageal reflux disease)       Physical Exam    Anesthesia Plan    ASA 3   ASA physical status 3 criteria: morbid obesity - BMI greater than or equal to 40    Plan - general       Airway plan will be ETT        Plan Factors:   Patient was previously instructed to abstain from smoking on day of procedure.  Patient did not smoke on day of procedure.      Induction: intravenous    Postoperative Plan: Postoperative administration of opioids is intended.    Pertinent diagnostic labs and testing reviewed    Informed Consent:      Use of blood products discussed with: patient whom consented to blood products.

## 2021-04-15 NOTE — ANESTHESIA PROCEDURE NOTES
Airway    Date/Time: 4/15/2021 8:01 AM  Performed by: Jessica Flores M.D.  Authorized by: Jessica Flores M.D.     Location:  OR  Urgency:  Elective  Indications for Airway Management:  Anesthesia      Spontaneous Ventilation: absent    Sedation Level:  Deep  Preoxygenated: Yes    Patient Position:  Sniffing  MILS Maintained Throughout: Yes    Mask Difficulty Assessment:  2 - vent by mask + OA or adjuvant +/- NMBA  Final Airway Type:  Endotracheal airway  Final Endotracheal Airway:  ETT  Cuffed: Yes    Technique Used for Successful ETT Placement:  Direct laryngoscopy  Devices/Methods Used in Placement:  Intubating stylet    Insertion Site:  Oral  Blade Type:  Thelma  Laryngoscope Blade/Videolaryngoscope Blade Size:  3  ETT Size (mm):  7.0  Measured from:  Teeth  ETT to Teeth (cm):  21  Placement Verified by: auscultation and capnometry    Cormack-Lehane Classification:  Grade IIa - partial view of glottis  Number of Attempts at Approach:  1  Ventilation Between Attempts:  BVM  Number of Other Approaches Attempted:  0

## 2021-04-15 NOTE — OR NURSING
[id number] Pt to PACU 9 from OR. Bedside report from anesthesiologist and RN.  Pt attached to monitoring, VSS. Pt sleeping. Oral airway in place.     0951 Pt awakening, oral airway DC'd    0952 Some bleeding from nose, drip pad/sling apllied    1100 nasal packs removed. Patient dressed, gauze replaced with nasal sling. Instructions given to . Waiting for  to arrive.

## 2021-04-19 ENCOUNTER — PRE-ADMISSION TESTING (OUTPATIENT)
Dept: ADMISSIONS | Facility: MEDICAL CENTER | Age: 76
End: 2021-04-19
Attending: OTOLARYNGOLOGY
Payer: MEDICARE

## 2021-04-19 DIAGNOSIS — Z01.812 PRE-OPERATIVE LABORATORY EXAMINATION: ICD-10-CM

## 2021-04-19 PROCEDURE — U0005 INFEC AGEN DETEC AMPLI PROBE: HCPCS

## 2021-04-19 PROCEDURE — C9803 HOPD COVID-19 SPEC COLLECT: HCPCS

## 2021-04-19 PROCEDURE — U0003 INFECTIOUS AGENT DETECTION BY NUCLEIC ACID (DNA OR RNA); SEVERE ACUTE RESPIRATORY SYNDROME CORONAVIRUS 2 (SARS-COV-2) (CORONAVIRUS DISEASE [COVID-19]), AMPLIFIED PROBE TECHNIQUE, MAKING USE OF HIGH THROUGHPUT TECHNOLOGIES AS DESCRIBED BY CMS-2020-01-R: HCPCS

## 2021-04-22 ENCOUNTER — HOSPITAL ENCOUNTER (OUTPATIENT)
Facility: MEDICAL CENTER | Age: 76
End: 2021-04-22
Attending: OTOLARYNGOLOGY | Admitting: OTOLARYNGOLOGY
Payer: MEDICARE

## 2021-04-22 VITALS
WEIGHT: 264.77 LBS | BODY MASS INDEX: 48.72 KG/M2 | RESPIRATION RATE: 18 BRPM | HEART RATE: 82 BPM | SYSTOLIC BLOOD PRESSURE: 133 MMHG | OXYGEN SATURATION: 95 % | DIASTOLIC BLOOD PRESSURE: 75 MMHG | HEIGHT: 62 IN | TEMPERATURE: 98.1 F

## 2021-04-22 PROCEDURE — 502588 HCHG PACK, MINOR: Performed by: OTOLARYNGOLOGY

## 2021-04-22 PROCEDURE — 160046 HCHG PACU - 1ST 60 MINS PHASE II: Performed by: OTOLARYNGOLOGY

## 2021-04-22 PROCEDURE — 160028 HCHG SURGERY MINUTES - 1ST 30 MINS LEVEL 3: Performed by: OTOLARYNGOLOGY

## 2021-04-22 PROCEDURE — 160025 RECOVERY II MINUTES (STATS): Performed by: OTOLARYNGOLOGY

## 2021-04-22 PROCEDURE — 500331 HCHG COTTONOID, SURG PATTIE: Performed by: OTOLARYNGOLOGY

## 2021-04-22 PROCEDURE — A9270 NON-COVERED ITEM OR SERVICE: HCPCS | Performed by: OTOLARYNGOLOGY

## 2021-04-22 PROCEDURE — 700102 HCHG RX REV CODE 250 W/ 637 OVERRIDE(OP): Performed by: OTOLARYNGOLOGY

## 2021-04-22 PROCEDURE — 160048 HCHG OR STATISTICAL LEVEL 1-5: Performed by: OTOLARYNGOLOGY

## 2021-04-22 PROCEDURE — 700101 HCHG RX REV CODE 250: Performed by: OTOLARYNGOLOGY

## 2021-04-22 RX ORDER — LIDOCAINE HYDROCHLORIDE 40 MG/ML
INJECTION, SOLUTION RETROBULBAR
Status: DISCONTINUED | OUTPATIENT
Start: 2021-04-22 | End: 2021-04-22 | Stop reason: HOSPADM

## 2021-04-22 RX ORDER — OXYMETAZOLINE HYDROCHLORIDE 0.05 G/100ML
SPRAY NASAL
Status: DISCONTINUED
Start: 2021-04-22 | End: 2021-04-22 | Stop reason: HOSPADM

## 2021-04-22 RX ORDER — LIDOCAINE HYDROCHLORIDE 40 MG/ML
INJECTION, SOLUTION RETROBULBAR
Status: DISCONTINUED
Start: 2021-04-22 | End: 2021-04-22 | Stop reason: HOSPADM

## 2021-04-22 RX ORDER — OXYMETAZOLINE HYDROCHLORIDE 0.05 G/100ML
SPRAY NASAL
Status: DISCONTINUED | OUTPATIENT
Start: 2021-04-22 | End: 2021-04-22 | Stop reason: HOSPADM

## 2021-04-22 ASSESSMENT — FIBROSIS 4 INDEX: FIB4 SCORE: 1.33

## 2021-04-22 NOTE — OR SURGEON
Immediate Post OP Note    PreOp Diagnosis: crs with polyps    PostOp Diagnosis: same     Procedure(s):  IRRIGATION, PARANASAL SINUS - FOR INTRAOP POSTOP SINUS DEBRIDEMENT. - Wound Class: Clean Contaminated    Surgeon(s):  Yon Rojas M.D.    Anesthesiologist/Type of Anesthesia:  No anesthesia staff entered./None    Surgical Staff:  Circulator: Anita A Reyes, R.N.; Shiva Rojas R.N.  Scrub Person: Jadyn Chan    Specimens removed if any:  * No specimens in log *    Estimated Blood Loss: none    Findings: healing appropriately     Complications: none        4/22/2021 10:30 AM Yon Rojas M.D.

## 2021-04-22 NOTE — OR NURSING
1032 Into phase 2, pt awake, denies pain and nausea. No IV present.    1040  to bedside    1050 DC instructions reviewed with pt and , verbal understanding received. All questions answered.    1101 Pt Dc'd home with  via  escort out to waiting car.

## 2021-04-22 NOTE — DISCHARGE INSTRUCTIONS
ACTIVITY: Rest and take it easy for the first 24 hours.  A responsible adult is recommended to remain with you during that time.  It is normal to feel sleepy.  We encourage you to not do anything that requires balance, judgment or coordination.    MILD FLU-LIKE SYMPTOMS ARE NORMAL. YOU MAY EXPERIENCE GENERALIZED MUSCLE ACHES, THROAT IRRITATION, HEADACHE AND/OR SOME NAUSEA.    FOR 24 HOURS DO NOT:  Drive, operate machinery or run household appliances.  Drink beer or alcoholic beverages.   Make important decisions or sign legal documents.    SPECIAL INSTRUCTIONS: continue to follow Dr. Rojas's specific instructions.     DIET: To avoid nausea, slowly advance diet as tolerated, avoiding spicy or greasy foods for the first day.  Add more substantial food to your diet according to your physician's instructions.  Babies can be fed formula or breast milk as soon as they are hungry.  INCREASE FLUIDS AND FIBER TO AVOID CONSTIPATION.      FOLLOW-UP APPOINTMENT:  A follow-up appointment should be arranged with your doctor; call to schedule.    You should CALL YOUR PHYSICIAN if you develop:  Fever greater than 101 degrees F.  Pain not relieved by medication, or persistent nausea or vomiting.  Excessive bleeding (blood soaking through dressing) or unexpected drainage from the wound.  Extreme redness or swelling around the incision site, drainage of pus or foul smelling drainage.  Inability to urinate or empty your bladder within 8 hours.  Problems with breathing or chest pain.    You should call 911 if you develop problems with breathing or chest pain.  If you are unable to contact your doctor or surgical center, you should go to the nearest emergency room or urgent care center.  Physician's telephone #: Dr. Rojas at [phone]    If any questions arise, call your doctor.  If your doctor is not available, please feel free to call the Surgical Center at [phone]. The Contact Center is open Monday through Friday 7AM to 5PM  and may speak to a nurse at [phone], or toll free at [phone].     A registered nurse may call you a few days after your surgery to see how you are doing after your procedure.    MEDICATIONS: Resume taking daily medication.  Take prescribed pain medication with food.  If no medication is prescribed, you may take non-aspirin pain medication if needed.  PAIN MEDICATION CAN BE VERY CONSTIPATING.  Take a stool softener or laxative such as senokot, pericolace, or milk of magnesia if needed.    Last pain medication given at NONE.    If your physician has prescribed pain medication that includes Acetaminophen (Tylenol), do not take additional Acetaminophen (Tylenol) while taking the prescribed medication.    Depression / Suicide Risk    As you are discharged from this [facility] facility, it is important to learn how to keep safe from harming yourself.    Recognize the warning signs:  · Abrupt changes in personality, positive or negative- including increase in energy   · Giving away possessions  · Change in eating patterns- significant weight changes-  positive or negative  · Change in sleeping patterns- unable to sleep or sleeping all the time   · Unwillingness or inability to communicate  · Depression  · Unusual sadness, discouragement and loneliness  · Talk of wanting to die  · Neglect of personal appearance   · Rebelliousness- reckless behavior  · Withdrawal from people/activities they love  · Confusion- inability to concentrate     If you or a loved one observes any of these behaviors or has concerns about self-harm, here's what you can do:  · Talk about it- your feelings and reasons for harming yourself  · Remove any means that you might use to hurt yourself (examples: pills, rope, extension cords, firearm)  · Get professional help from the community (Mental Health, Substance Abuse, psychological counseling)  · Do not be alone:Call your Safe Contact- someone whom you trust who will be there for  you.  · Call your local CRISIS HOTLINE 441-5328 or 890-352-0896  · Call your local Children's Mobile Crisis Response Team [place] (836) 685-0952 or www.Mobstats  · Call the toll free National Suicide Prevention Hotlines   · National Suicide Prevention Lifeline 750-397-YAYI (2918)  · National Simply H[employer] Line Network 800-SUICIDE (262-6697)

## 2021-04-22 NOTE — OP REPORT
DATE OF SERVICE:  04/22/2021     PREOPERATIVE DIAGNOSIS:  Chronic sinusitis with polyps.     POSTOPERATIVE DIAGNOSIS:  Chronic sinusitis with polyps.     PROCEDURES:  Bilateral endoscopic sinus debridement.     SURGEON:  Yon Rojas MD     ASSISTANT:  None.     ANESTHESIA:  None.     COMPLICATIONS:  None.     ESTIMATED BLOOD LOSS:  None.     SPECIMENS:  None.     INDICATIONS:  A 75-year-old female who underwent endoscopic sinus surgery last   week, due to the COVID pandemic, I elected to debride her in the operating   room.  After discussion of the risks, benefits and alternatives, she elected   to undergo the above procedure.  Pathology shows chronic sinusitis.  She is   breathing well.  She has some drainage.  Her smell is decreased.  No   headaches.     DESCRIPTION OF PROCEDURE:  The patient was brought to the operating room on a   gurney, draped in the usual sterile fashion.  A timeout was performed.  4%   lidocaine and Afrin pledgets were placed in the left and right nasal cavities.    Once this was allowed to take effect, I used a 0-degree endoscope and I   debrided clot from the right middle meatus.  The maxillary and sphenoids were   open, the ethmoid appeared to be healing properly.  On the left hand side,   there was more PosiSep which I suctioned.  The maxillary was patent.  The   sphenoid was patent and the ethmoids were healing appropriately.  The patient   tolerated the procedure well.        ______________________________  MD JOSE Vizcaino/ROMÁN/BRIDGET    DD:  04/22/2021 10:32  DT:  04/22/2021 10:44    Job#:  [id number]

## 2021-04-27 ENCOUNTER — PATIENT MESSAGE (OUTPATIENT)
Dept: HEALTH INFORMATION MANAGEMENT | Facility: OTHER | Age: 76
End: 2021-04-27

## 2021-05-07 ENCOUNTER — PATIENT MESSAGE (OUTPATIENT)
Dept: HEALTH INFORMATION MANAGEMENT | Facility: OTHER | Age: 76
End: 2021-05-07

## 2021-05-07 ENCOUNTER — PATIENT OUTREACH (OUTPATIENT)
Dept: HEALTH INFORMATION MANAGEMENT | Facility: OTHER | Age: 76
End: 2021-05-07

## 2021-05-07 NOTE — PROGRESS NOTES
Outcome: Scheduled    Please transfer to Patient Outreach Team at [phone] when patient returns call.    WebIZ Checked & Epic Updated:  no    HealthConnect Verified: no    Attempt # 1

## 2021-07-28 ENCOUNTER — APPOINTMENT (OUTPATIENT)
Dept: RADIOLOGY | Facility: MEDICAL CENTER | Age: 76
End: 2021-07-28
Attending: FAMILY MEDICINE
Payer: MEDICARE

## 2021-07-28 DIAGNOSIS — J20.8 ACUTE BRONCHITIS DUE TO OTHER SPECIFIED ORGANISMS: ICD-10-CM

## 2021-07-28 PROCEDURE — 71046 X-RAY EXAM CHEST 2 VIEWS: CPT

## 2021-08-11 ENCOUNTER — TELEPHONE (OUTPATIENT)
Dept: SLEEP MEDICINE | Facility: MEDICAL CENTER | Age: 76
End: 2021-08-11

## 2021-08-11 NOTE — TELEPHONE ENCOUNTER
Sorry about that. Yes, new machine and it is set for 12-15. 90 day compliance scanned into media. Thank you.

## 2021-08-11 NOTE — TELEPHONE ENCOUNTER
MsArmando Marisabel called and LM. She states that she would like her CPAP pressure increased.       Request routed to provider pool, as Dr. Harris is no longer with our practice.

## 2021-08-12 NOTE — TELEPHONE ENCOUNTER
I have scanned a 30 day compliance for you as well. I left Ms. Petit a message with this information and send her a [employer] message as well.Will await to see how Ms. Mejia would like to proceed.

## 2021-08-13 ENCOUNTER — HOSPITAL ENCOUNTER (OUTPATIENT)
Dept: RADIOLOGY | Facility: MEDICAL CENTER | Age: 76
End: 2021-08-13
Attending: FAMILY MEDICINE
Payer: MEDICARE

## 2021-08-13 DIAGNOSIS — R05.9 COUGH: ICD-10-CM

## 2021-08-13 PROCEDURE — 71046 X-RAY EXAM CHEST 2 VIEWS: CPT

## 2021-08-16 ENCOUNTER — HOSPITAL ENCOUNTER (OUTPATIENT)
Dept: LAB | Facility: MEDICAL CENTER | Age: 76
End: 2021-08-16
Attending: FAMILY MEDICINE
Payer: MEDICARE

## 2021-08-16 LAB
ALBUMIN SERPL BCP-MCNC: 3.9 G/DL (ref 3.2–4.9)
ALBUMIN/GLOB SERPL: 1.1 G/DL
ALP SERPL-CCNC: 112 U/L (ref 30–99)
ALT SERPL-CCNC: 10 U/L (ref 2–50)
ANION GAP SERPL CALC-SCNC: 14 MMOL/L (ref 7–16)
APPEARANCE UR: CLEAR
AST SERPL-CCNC: 12 U/L (ref 12–45)
BACTERIA #/AREA URNS HPF: NEGATIVE /HPF
BASOPHILS # BLD AUTO: 0.3 % (ref 0–1.8)
BASOPHILS # BLD: 0.03 K/UL (ref 0–0.12)
BILIRUB SERPL-MCNC: 0.5 MG/DL (ref 0.1–1.5)
BILIRUB UR QL STRIP.AUTO: NEGATIVE
BUN SERPL-MCNC: 21 MG/DL (ref 8–22)
CALCIUM SERPL-MCNC: 9.1 MG/DL (ref 8.5–10.5)
CHLORIDE SERPL-SCNC: 99 MMOL/L (ref 96–112)
CHOLEST SERPL-MCNC: 156 MG/DL (ref 100–199)
CO2 SERPL-SCNC: 23 MMOL/L (ref 20–33)
COLOR UR: YELLOW
CREAT SERPL-MCNC: 0.87 MG/DL (ref 0.5–1.4)
EOSINOPHIL # BLD AUTO: 0.02 K/UL (ref 0–0.51)
EOSINOPHIL NFR BLD: 0.2 % (ref 0–6.9)
EPI CELLS #/AREA URNS HPF: ABNORMAL /HPF
ERYTHROCYTE [DISTWIDTH] IN BLOOD BY AUTOMATED COUNT: 47.8 FL (ref 35.9–50)
EST. AVERAGE GLUCOSE BLD GHB EST-MCNC: 123 MG/DL
FASTING STATUS PATIENT QL REPORTED: NORMAL
GLOBULIN SER CALC-MCNC: 3.4 G/DL (ref 1.9–3.5)
GLUCOSE SERPL-MCNC: 110 MG/DL (ref 65–99)
GLUCOSE UR STRIP.AUTO-MCNC: NEGATIVE MG/DL
HBA1C MFR BLD: 5.9 % (ref 4–5.6)
HCT VFR BLD AUTO: 43.6 % (ref 37–47)
HDLC SERPL-MCNC: 51 MG/DL
HGB BLD-MCNC: 14 G/DL (ref 12–16)
HYALINE CASTS #/AREA URNS LPF: ABNORMAL /LPF
IMM GRANULOCYTES # BLD AUTO: 0.04 K/UL (ref 0–0.11)
IMM GRANULOCYTES NFR BLD AUTO: 0.5 % (ref 0–0.9)
KETONES UR STRIP.AUTO-MCNC: NEGATIVE MG/DL
LDLC SERPL CALC-MCNC: 91 MG/DL
LEUKOCYTE ESTERASE UR QL STRIP.AUTO: ABNORMAL
LYMPHOCYTES # BLD AUTO: 2.07 K/UL (ref 1–4.8)
LYMPHOCYTES NFR BLD: 23.6 % (ref 22–41)
MCH RBC QN AUTO: 28.9 PG (ref 27–33)
MCHC RBC AUTO-ENTMCNC: 32.1 G/DL (ref 33.6–35)
MCV RBC AUTO: 90.1 FL (ref 81.4–97.8)
MICRO URNS: ABNORMAL
MONOCYTES # BLD AUTO: 0.72 K/UL (ref 0–0.85)
MONOCYTES NFR BLD AUTO: 8.2 % (ref 0–13.4)
NEUTROPHILS # BLD AUTO: 5.9 K/UL (ref 2–7.15)
NEUTROPHILS NFR BLD: 67.2 % (ref 44–72)
NITRITE UR QL STRIP.AUTO: NEGATIVE
NRBC # BLD AUTO: 0 K/UL
NRBC BLD-RTO: 0 /100 WBC
PH UR STRIP.AUTO: 7 [PH] (ref 5–8)
PLATELET # BLD AUTO: 311 K/UL (ref 164–446)
PMV BLD AUTO: 10.4 FL (ref 9–12.9)
POTASSIUM SERPL-SCNC: 4.3 MMOL/L (ref 3.6–5.5)
PROT SERPL-MCNC: 7.3 G/DL (ref 6–8.2)
PROT UR QL STRIP: NEGATIVE MG/DL
RBC # BLD AUTO: 4.84 M/UL (ref 4.2–5.4)
RBC # URNS HPF: ABNORMAL /HPF
RBC UR QL AUTO: NEGATIVE
SODIUM SERPL-SCNC: 136 MMOL/L (ref 135–145)
SP GR UR STRIP.AUTO: 1.02
TRIGL SERPL-MCNC: 68 MG/DL (ref 0–149)
TSH SERPL DL<=0.005 MIU/L-ACNC: 1.4 UIU/ML (ref 0.38–5.33)
UROBILINOGEN UR STRIP.AUTO-MCNC: 0.2 MG/DL
WBC # BLD AUTO: 8.8 K/UL (ref 4.8–10.8)
WBC #/AREA URNS HPF: ABNORMAL /HPF

## 2021-08-16 PROCEDURE — 80061 LIPID PANEL: CPT

## 2021-08-16 PROCEDURE — 85025 COMPLETE CBC W/AUTO DIFF WBC: CPT

## 2021-08-16 PROCEDURE — 81001 URINALYSIS AUTO W/SCOPE: CPT

## 2021-08-16 PROCEDURE — 83036 HEMOGLOBIN GLYCOSYLATED A1C: CPT

## 2021-08-16 PROCEDURE — 80053 COMPREHEN METABOLIC PANEL: CPT

## 2021-08-16 PROCEDURE — 84443 ASSAY THYROID STIM HORMONE: CPT

## 2021-08-16 PROCEDURE — 36415 COLL VENOUS BLD VENIPUNCTURE: CPT

## 2021-08-17 ENCOUNTER — HOSPITAL ENCOUNTER (OUTPATIENT)
Dept: RADIOLOGY | Facility: MEDICAL CENTER | Age: 76
End: 2021-08-17
Attending: FAMILY MEDICINE
Payer: MEDICARE

## 2021-08-17 DIAGNOSIS — Z12.31 VISIT FOR SCREENING MAMMOGRAM: ICD-10-CM

## 2021-08-17 DIAGNOSIS — N95.8 OTHER SPECIFIED MENOPAUSAL AND PERIMENOPAUSAL DISORDERS: ICD-10-CM

## 2021-08-17 PROCEDURE — 77080 DXA BONE DENSITY AXIAL: CPT

## 2021-08-17 PROCEDURE — 77063 BREAST TOMOSYNTHESIS BI: CPT

## 2021-10-12 ENCOUNTER — OFFICE VISIT (OUTPATIENT)
Dept: SLEEP MEDICINE | Facility: MEDICAL CENTER | Age: 76
End: 2021-10-12
Payer: MEDICARE

## 2021-10-12 VITALS
WEIGHT: 262 LBS | HEIGHT: 62 IN | OXYGEN SATURATION: 94 % | BODY MASS INDEX: 48.21 KG/M2 | RESPIRATION RATE: 16 BRPM | HEART RATE: 87 BPM | DIASTOLIC BLOOD PRESSURE: 84 MMHG | SYSTOLIC BLOOD PRESSURE: 128 MMHG

## 2021-10-12 DIAGNOSIS — G47.33 OSA (OBSTRUCTIVE SLEEP APNEA): ICD-10-CM

## 2021-10-12 PROCEDURE — 99213 OFFICE O/P EST LOW 20 MIN: CPT | Performed by: FAMILY MEDICINE

## 2021-10-12 ASSESSMENT — FIBROSIS 4 INDEX: FIB4 SCORE: 0.92

## 2021-10-12 NOTE — PROGRESS NOTES
[facility] Sleep Center Follow Up Note     Date: 10/12/2021 / Time: 2:26 PM    Patient ID:   Name:             Cyndy Petit   YOB: 1945  Age:                 75 y.o.  female   MRN:               [id number]      Thank you for requesting a sleep medicine consultation on Cyndy Petit at the sleep center. She presents today with the chief complaints of BEATRIZ follow up.  She has history of mild BEATRIZ with AHI of 12/h.    HISTORY OF PRESENT ILLNESS:       Pt is currently on CPAP 16 cm. She goes to sleep around 9:30 pm and wakes up around 3-6 am. Overall,  She does finds her sleep refreshing if she gets enough hours of sleep.She does take regular naps. The naps are usually 30 min long. She denies any symptoms of RLS, narcolepsy or any symptoms to suggest parasomnias such as nightmares, sleep walking or acting out of dreams.  She is using CPAP most days of the week. Pt reports 5 hrs of average nightly use of CPAP. Pt denies snoring, gasping,choking.Pt also denies significant mask leak that is interfering with sleep. The 30 day compliance was downloaded which shows adequate compliance with more that 4 hr usage about 73%. The AHI is has improved to 2/hr. The mask leak is normal. The symptoms of BEATRIZ are controlled with the therapy.    Other comorbid conditions include GERD, hypertension chronic back pain status post laminectomy, hyperglycemia, moderate persistent asthma, seasonal allergies, MDD and insomnia.    SLEEP HISTORY   CPAP titration: The CPAP pressure was initiated at 4 cm of water and the pressure was increased in an attempt to eliminate all sleep disordered breathing and snoring. The CPAP pressure was increased to 14 cm water and at this final pressure the patient was observed in the supine position and in the REM sleep stage. The apnea hypopnea index was 0.0 per hour and the low oxygen saturation 92%.      REVIEW OF SYSTEMS:       Constitutional: Denies fevers, Denies weight  changes  Eyes: Denies changes in vision, no eye pain  Ears/Nose/Throat/Mouth: Denies nasal congestion or sore throat   Cardiovascular: Denies chest pain or palpitations   Respiratory: Denies shortness of breath , Denies cough  Gastrointestinal/Hepatic: Denies abdominal pain, nausea, vomiting, diarrhea, constipation or GI bleeding   Genitourinary: Deniesdysuria or frequency  Musculoskeletal/Rheum: Denies  joint pain and swelling   Skin/Breast: Denies rash,   Neurological: Denies headache, confusion, memory loss or focal weakness/parasthesias  Psychiatric: denies mood disorder   Sleep: denies snoring     Comprehensive review of systems form is reviewed with the patient and is attached in the EMR.     PMH:  has a past medical history of Allergic rhinitis, Apnea, sleep, Arthritis, Asthma, Back pain, Bowel habit changes, Bronchiectasis ([facility]), Bronchitis, Carpal tunnel syndrome, Chest pain, Chest tightness, Cold (4-8-2017), Constipation, Cough, Daytime sleepiness, Depression, Difficulty breathing, Difficulty swallowing, DJD (degenerative joint disease), Frequent urination, GERD (gastroesophageal reflux disease), GERD (gastroesophageal reflux disease), Hearing difficulty, Heart burn, Heartburn, Hiatus hernia syndrome, Hypertension, Influenza, Insomnia, Lumps on the skin, Nasal drainage, Obesity, Obstructive sleep apnea, Pain, Painful joint, Palpitations, Pneumonia, Pulmonary hypertension ([facility]), Rash, Rhinitis, Shortness of breath, Skin change, Sleep apnea, Snoring, Sore muscles, Sore throat, chronic, Sorethroat (2/09/2016), Swelling of lower extremity, Thrush, Tonsillitis, Urinary incontinence, Wears glasses, and Wheezing. She also has no past medical history of Self-injurious behavior, Substance abuse ([facility]), or Suicide attempt ([facility]).  MEDS:   Current Outpatient Medications:   •  Azelastine HCl 137 MCG/SPRAY Solution, USE 2 SPRAY(S) IN EACH NOSTRIL TWICE DAILY, Disp: , Rfl:   •  Amoxicillin 500 MG Tab, Take  by mouth.  Prior to dental, Disp: , Rfl:   •  fexofenadine (ALLEGRA) 180 MG tablet, Take 180 mg by mouth every day., Disp: , Rfl:   •  Docusate Calcium (STOOL SOFTENER PO), Take  by mouth 2 Times a Day., Disp: , Rfl:   •  Multiple Vitamins-Minerals (PRESERVISION AREDS 2 PO), Take  by mouth 2 Times a Day., Disp: , Rfl:   •  benzonatate (TESSALON) 100 MG Cap, Take 100 mg by mouth 3 times a day as needed for Cough., Disp: , Rfl:   •  DULoxetine (CYMBALTA) 30 MG Cap DR Particles, TAKE 1 CAPSULE BY MOUTH ONCE DAILY TAKE WITH 60 MG DULOXETINE, Disp: , Rfl: 2  •  amLODIPine (NORVASC) 10 MG Tab, TAKE 1 TABLET BY MOUTH ONCE DAILY. REPLACES 5 MG, Disp: , Rfl: 2  •  ipratropium-albuterol (DUONEB) 0.5-2.5 (3) MG/3ML nebulizer solution, 3 mL by Nebulization route 4 times a day., Disp: , Rfl:   •  gabapentin (NEURONTIN) 300 MG Cap, Take 300 mg by mouth 4 times a day., Disp: , Rfl:   •  fluticasone-salmeterol (ADVAIR DISKUS) 500-50 MCG/DOSE AEROSOL POWDER, BREATH ACTIVATED, Inhale 1 Puff by mouth every 12 hours., Disp: , Rfl:   •  fluticasone (FLONASE) 50 MCG/ACT nasal spray, Spray 1 Spray in nose every day., Disp: , Rfl:   •  multivitamin (THERAGRAN) Tab, Take 1 Tab by mouth every day., Disp: , Rfl:   •  Multiple Vitamins-Minerals (HAIR SKIN AND NAILS FORMULA) Tab, Take 1 Tab by mouth every day., Disp: , Rfl:   •  hydrocodone-acetaminophen (NORCO) 5-325 MG Tab per tablet, Take 1-2 Tabs by mouth every 6 hours as needed., Disp: , Rfl:   •  duloxetine (CYMBALTA) 60 MG Cap DR Particles delayed-release capsule, Take 60 mg by mouth every day., Disp: , Rfl:   •  albuterol (PROAIR HFA) 108 (90 BASE) MCG/ACT AERS, Inhale 2 Puffs by mouth every 6 hours as needed for Shortness of Breath. Shortness of breath, Disp: , Rfl:   •  losartan (COZAAR) 100 MG TABS, Take 100 mg by mouth every day., Disp: , Rfl:   •  nystatin (MYCOSTATIN) 653912 UNIT/ML SUSP, Take 100,000 Units by mouth 5 Times a Day., Disp: , Rfl:   •  montelukast (SINGULAIR) 10 MG TABS, Take  10 mg by mouth every morning., Disp: , Rfl:   •  trazodone (DESYREL) 50 MG TABS, Take 50 mg by mouth every evening., Disp: , Rfl:   •  omeprazole (PRILOSEC) 20 MG CPDR, Take 20 mg by mouth 2 times a day., Disp: , Rfl:   ALLERGIES:   Allergies   Allergen Reactions   • Allergy      Unknown antibiotic   • Cefepime Hives   • Cephalosporins Hives   • Clarithromycin Hives   • Cleocin [Clindamycin] Itching   • Meropenem Hives   • Morphine Itching   • Sulfamethoxazole-Trimethoprim Itching     SURGHX:   Past Surgical History:   Procedure Laterality Date   • SINUS WASHING Bilateral 4/22/2021    Procedure: IRRIGATION, PARANASAL SINUS - FOR INTRAOP POSTOP SINUS DEBRIDEMENT.;  Surgeon: Yon Rojas M.D.;  Location: SURGERY SAME DAY [facility];  Service: Ent   • PB REPAIR OF NASAL SEPTUM  4/15/2021    Procedure: SEPTOPLASTY, NOSE;  Surgeon: Yon Rojas M.D.;  Location: SURGERY SAME DAY [facility];  Service: Ent   • ENDOSCOPY, PARANASAL SINUS, WITH TOTAL ETHMOIDECTOMY, SS Bilateral 4/15/2021    Procedure: ENDOSCOPY, PARANASAL SINUS, WITH TOTAL ETHMOIDECTOMY, SPHENOIDOTOMY, MAXILLARY ANTROSTOMY, MAXILLARY SINUS TISS REM, AND EXPLOR FRONT SINUS;  Surgeon: Yon Rojas M.D.;  Location: SURGERY SAME [facility];  Service: Ent   • STEREOTACTIC COMPUTER ASSISTED PROCEDURE CRANIAL, EXTRADURAL  4/15/2021    Procedure: STEREOTACTIC COMPUTER ASSISTED PROCEDURE CRANIAL,EXTRADURAL;  Surgeon: Yon Rojas M.D.;  Location: SURGERY SAME [facility];  Service: Ent   • SINUS TREPHINE FRONTAL  4/15/2021    Procedure: SINUSOTOMY, FRONTAL SINUS, USING TREPHINE-TYPE DEVICE - ENDOSCOPIC.;  Surgeon: oYn Rojas M.D.;  Location: SURGERY SAME [facility];  Service: Ent   • KNEE ARTHROPLASTY TOTAL Right 5/1/2017    Procedure: KNEE ARTHROPLASTY TOTAL;  Surgeon: Jesús Rutledge M.D.;  Location: [facility];  Service:    • KNEE ARTHROPLASTY TOTAL Left 3/7/2016    Procedure: KNEE ARTHROPLASTY TOTAL;  Surgeon: Jesús Rutledge M.D.;   Location: SURGERY [facility];  Service:    • LUMBAR FUSION POSTERIOR  9/13/2013    Performed by Kaushal Ayala M.D. at SURGERY [facility]   • LUMBAR LAMINECTOMY DISKECTOMY  9/13/2013    Performed by Kaushal Ayala M.D. at SURGERY [facility]   • LUMBAR LAMINECTOMY DISKECTOMY  5/30/2013    Performed by Kaushal Ayala M.D. at SURGERY [facility]   • CHOLECYSTECTOMY  2010    laparoscopic   • OTHER NEUROLOGICAL SURG  2/2009    I&D of brain from sinus infection   • FORAMINOTOMY  11/26/08    Performed by [facility] at SURGERY [facility]   • HARDWARE REMOVAL NEURO  11/26/08    Performed by [facility] at SURGERY [facility]   • KNEE ARTHROSCOPY Right 2008   • LUMBAR FUSION POSTERIOR  2005   • CARPAL TUNNEL RELEASE Right 2000   • SHOULDER ARTHROTOMY Right 2000   • ARTHROSCOPY, KNEE     • EYE SURGERY Bilateral     Cataract surgery 2/2018, 3/2018   • LAMINOTOMY     • PB REMV 2ND CATARACT,CORN-SCLER SECTN     • TONSILLECTOMY       SOCHX:  reports that she quit smoking about 43 years ago. Her smoking use included cigarettes. She started smoking about 49 years ago. She has a 6.00 pack-year smoking history. She has never used smokeless tobacco. She reports that she does not drink alcohol and does not use drugs..  FH:   Family History   Problem Relation Age of Onset   • Heart Disease Father    • Arthritis Father    • Lung Disease Father         asthma   • Psychiatric Illness Father         depression   • Alcohol/Drug Father    • Depression Father    • Hypertension Mother    • Cancer Mother    • Heart Disease Mother    • Arthritis Mother    • Stroke Mother    • Hyperlipidemia Other    • Alcohol/Drug Brother    • Depression Daughter    • Drug abuse Son         in remission   • Bipolar disorder Grandchild    • Genetic Disorder Neg Hx    • Diabetes Neg Hx          Physical Exam:  Vitals/ General Appearance:   Weight/BMI: There is no height or weight on file to calculate BMI.  There were no  vitals taken for this visit.  There were no vitals filed for this visit.    Pt. is alert and oriented to time, place and person. Cooperative and in no apparent distress.       Constitutional: Alert, no distress, well-groomed.  Skin: No rashes in visible areas.  Eye: Round. Conjunctiva clear, lids normal. No icterus.   ENMT: Lips pink without lesions, good dentition, moist mucous membranes. Phonation normal.  Neck: No masses, no thyromegaly. Moves freely without pain.  CV: Pulse as reported by patient  Respiratory: Unlabored respiratory effort, no cough or audible wheeze  Psych: Alert and oriented x3, normal affect and mood.     ASSESSMENT AND PLAN     1. Sleep Apnea :The pathophysiology of sleep anea and the increased risk of cardiovascular morbidity from untreated sleep apnea is discussed in detail with the patient.    She is urged to avoid supine sleep, weight gain and alcoholic beverages since all of these can worsen sleep apnea. She is cautioned against drowsy driving. If She feels sleepy while driving, She must pull over for a break/nap, rather than persist on the road, in the interest of She own safety and that of others on the road.   Plan   - Continue CPAP ACPAP 15-18 mask    - supplies are refilled    - compliance download was reviewed and discussed with the pt   - compliance was reinforced     2. Regarding treatment of other past medical problems and general health maintenance,  She is urged to follow up with PCP.

## 2022-02-11 ENCOUNTER — PATIENT MESSAGE (OUTPATIENT)
Dept: HEALTH INFORMATION MANAGEMENT | Facility: OTHER | Age: 77
End: 2022-02-11
Payer: MEDICARE

## 2022-02-15 ENCOUNTER — TELEPHONE (OUTPATIENT)
Dept: HEALTH INFORMATION MANAGEMENT | Facility: OTHER | Age: 77
End: 2022-02-15

## 2022-02-22 PROBLEM — J41.1 MUCOPURULENT CHRONIC BRONCHITIS (HCC): Status: ACTIVE | Noted: 2022-02-22

## 2022-02-22 PROBLEM — M51.369 DEGENERATIVE DISC DISEASE, LUMBAR: Status: ACTIVE | Noted: 2022-02-22

## 2022-02-22 PROBLEM — R73.03 PRE-DIABETES: Status: ACTIVE | Noted: 2022-02-22

## 2022-02-22 PROBLEM — E66.01 MORBID OBESITY WITH BMI OF 50.0-59.9, ADULT (HCC): Status: ACTIVE | Noted: 2022-02-22

## 2022-02-22 PROBLEM — M81.0 AGE-RELATED OSTEOPOROSIS WITHOUT CURRENT PATHOLOGICAL FRACTURE: Status: ACTIVE | Noted: 2022-02-22

## 2022-02-22 PROBLEM — H35.3131 EARLY DRY STAGE NONEXUDATIVE AGE-RELATED MACULAR DEGENERATION OF BOTH EYES: Status: ACTIVE | Noted: 2022-02-22

## 2022-02-22 PROBLEM — M51.36 DEGENERATIVE DISC DISEASE, LUMBAR: Status: ACTIVE | Noted: 2022-02-22

## 2022-02-22 PROBLEM — Z87.448 HISTORY OF CHRONIC KIDNEY DISEASE: Status: ACTIVE | Noted: 2022-02-22

## 2022-05-11 ENCOUNTER — OFFICE VISIT (OUTPATIENT)
Dept: SLEEP MEDICINE | Facility: MEDICAL CENTER | Age: 77
End: 2022-05-11
Payer: MEDICARE

## 2022-05-11 VITALS
DIASTOLIC BLOOD PRESSURE: 78 MMHG | BODY MASS INDEX: 50.98 KG/M2 | HEIGHT: 61 IN | HEART RATE: 86 BPM | SYSTOLIC BLOOD PRESSURE: 128 MMHG | WEIGHT: 270 LBS | OXYGEN SATURATION: 94 % | RESPIRATION RATE: 16 BRPM

## 2022-05-11 DIAGNOSIS — G47.33 OSA (OBSTRUCTIVE SLEEP APNEA): ICD-10-CM

## 2022-05-11 PROCEDURE — 99214 OFFICE O/P EST MOD 30 MIN: CPT | Performed by: INTERNAL MEDICINE

## 2022-05-11 ASSESSMENT — FIBROSIS 4 INDEX: FIB4 SCORE: 0.93

## 2022-05-11 NOTE — PROGRESS NOTES
CC: annual cpap visit         HPI:  Cyndy Petit is a 76 y.o.female  kindly referred by Sultana Browne M.D. and last seen for mild BEATRIZ 10/12/21 when her compliance was excellent..  She is on CPAP at 14 cm water and is treated for mild BEATRIZ with an AHI of 12.    On apap 15-18 cm water her compliance is excellent and her ahi is 1.2.    The patient reports improved symptoms using positive airway pressure.  Has experienced no significant problems with mask fit, mask leak, pressure tolerance, excessive airway dryness, nasal congestion, aerophagia, or chest pain.       Significant comorbidities and modifying factors include environmental allergies, asthma, former smoker, obesity,  Back pain, systemic arterial hypertension, gastroesophageal reflux, and up-to-date with age-appropriate vaccinations.      Patient Active Problem List    Diagnosis Date Noted   • History of chronic kidney disease 02/22/2022   • Pre-diabetes 02/22/2022   • BMI 50.0-59.9, adult ([facility]) 02/22/2022   • Morbid obesity with BMI of 50.0-59.9, adult ([facility]) 02/22/2022   • Mucopurulent chronic bronchitis ([facility]) 02/22/2022   • Early dry stage nonexudative age-related macular degeneration of both eyes 02/22/2022   • Degenerative disc disease, lumbar 02/22/2022   • Age-related osteoporosis without current pathological fracture 02/22/2022   • Chest pain 11/25/2018   • Subacute frontal sinusitis 11/25/2018   • MDD (major depressive disorder), recurrent, in full remission ([facility]) 08/27/2018   • Chronic insomnia 08/27/2018   • Elevated troponin 11/14/2017   • Hypertensive urgency 11/14/2017   • Moderate persistent asthma without complication 12/06/2016   • BEATRIZ (obstructive sleep apnea) 12/06/2016   • Seasonal allergies 12/06/2016   • Hypokalemia 03/10/2016   • H/O knee surgery 03/10/2016   • Pain and swelling of left knee 03/10/2016   • Primary osteoarthritis of left knee 03/07/2016   • Lumbosacral radiculopathy at S1 05/31/2013   • S/P laminectomy  "05/30/2013   • Hyponatremia 05/25/2013   • GERD (gastroesophageal reflux disease) 05/25/2013   • HTN (hypertension) 05/25/2013   • Back pain 05/24/2013       Past Medical History:   Diagnosis Date   • Allergic rhinitis    • Apnea, sleep    • Arthritis    • Asthma     inhalers   • Back pain    • Bowel habit changes     constipation   • Bronchiectasis (HCC)    • Bronchitis    • Carpal tunnel syndrome    • Chest pain    • Chest tightness    • Cold 4-8-2017    cold; taking antibiotics  4-   • Constipation    • Cough    • Daytime sleepiness    • Depression    • Difficulty breathing    • Difficulty swallowing    • DJD (degenerative joint disease)    • Frequent urination    • GERD (gastroesophageal reflux disease)    • GERD (gastroesophageal reflux disease)    • Hearing difficulty    • Heart burn    • Heartburn    • Hiatus hernia syndrome    • Hyperglycemia 5/31/2013   • Hypertension    • Influenza    • Insomnia    • Lumps on the skin    • Nasal drainage    • Obesity    • Obstructive sleep apnea    • Pain     back, knees   • Painful joint    • Palpitations    • Pneumonia    • Pulmonary hypertension (HCC)    • Rash    • Rhinitis    • Shortness of breath    • Skin change    • Sleep apnea     CPAP   • Snoring    • Sore muscles    • Sore throat, chronic    • Sorethroat 2/09/2016   • Swelling of lower extremity    • Thrush     from \"Advair\", takes nystatin   • Tonsillitis    • Urinary incontinence    • Wears glasses    • Wheezing         Past Surgical History:   Procedure Laterality Date   • SINUS WASHING Bilateral 4/22/2021    Procedure: IRRIGATION, PARANASAL SINUS - FOR INTRAOP POSTOP SINUS DEBRIDEMENT.;  Surgeon: Yon Rojas M.D.;  Location: SURGERY SAME DAY [facility];  Service: Ent   • NE REPAIR OF NASAL SEPTUM  4/15/2021    Procedure: SEPTOPLASTY, NOSE;  Surgeon: Yon Rojas M.D.;  Location: SURGERY SAME DAY [facility];  Service: Ent   • ENDOSCOPY, PARANASAL SINUS, WITH TOTAL ETHMOIDECTOMY, SS Bilateral " 4/15/2021    Procedure: ENDOSCOPY, PARANASAL SINUS, WITH TOTAL ETHMOIDECTOMY, SPHENOIDOTOMY, MAXILLARY ANTROSTOMY, MAXILLARY SINUS TISS REM, AND EXPLOR FRONT SINUS;  Surgeon: Yon Rojas M.D.;  Location: SURGERY SAME DAY [facility];  Service: Ent   • STEREOTACTIC COMPUTER ASSISTED PROCEDURE CRANIAL, EXTRADURAL  4/15/2021    Procedure: STEREOTACTIC COMPUTER ASSISTED PROCEDURE CRANIAL,EXTRADURAL;  Surgeon: Yon Rojas M.D.;  Location: SURGERY SAME DAY [facility];  Service: Ent   • SINUS TREPHINE FRONTAL  4/15/2021    Procedure: SINUSOTOMY, FRONTAL SINUS, USING TREPHINE-TYPE DEVICE - ENDOSCOPIC.;  Surgeon: Yon Rojas M.D.;  Location: SURGERY SAME DAY [facility];  Service: Ent   • KNEE ARTHROPLASTY TOTAL Right 5/1/2017    Procedure: KNEE ARTHROPLASTY TOTAL;  Surgeon: Jesús Rutledge M.D.;  Location: SURGERY [facility];  Service:    • KNEE ARTHROPLASTY TOTAL Left 3/7/2016    Procedure: KNEE ARTHROPLASTY TOTAL;  Surgeon: Jesús Rutledge M.D.;  Location: SURGERY [facility];  Service:    • FUSION, SPINE, LUMBAR, PLIF  9/13/2013    Performed by Kaushal Ayala M.D. at SURGERY [facility]   • LUMBAR LAMINECTOMY DISKECTOMY  9/13/2013    Performed by Kaushal Ayala M.D. at SURGERY [facility]   • LUMBAR LAMINECTOMY DISKECTOMY  5/30/2013    Performed by Kaushal Ayala M.D. at SURGERY [facility]   • CHOLECYSTECTOMY  2010    laparoscopic   • OTHER NEUROLOGICAL SURG  2/2009    I&D of brain from sinus infection   • FORAMINOTOMY  11/26/08    Performed by [facility] at SURGERY [facility]   • HARDWARE REMOVAL NEURO  11/26/08    Performed by [facility] at SURGERY [facility]   • KNEE ARTHROSCOPY Right 2008   • FUSION, SPINE, LUMBAR, PLIF  2005   • CARPAL TUNNEL RELEASE Right 2000   • SHOULDER ARTHROTOMY Right 2000   • ARTHROSCOPY, KNEE     • EYE SURGERY Bilateral     Cataract surgery 2/2018, 3/2018   • LAMINOTOMY     • NC REMV 2ND CATARACT,CORN-SCLER SECTN     • TONSILLECTOMY          Family History   Problem Relation Age of Onset   • Heart Disease Father    • Arthritis Father    • Lung Disease Father         asthma   • Psychiatric Illness Father         depression   • Alcohol/Drug Father    • Depression Father    • Hypertension Mother    • Cancer Mother    • Heart Disease Mother    • Arthritis Mother    • Stroke Mother    • Hyperlipidemia Other    • Alcohol/Drug Brother    • Depression Daughter    • Drug abuse Son         in remission   • Bipolar disorder Grandchild    • Genetic Disorder Neg Hx    • Diabetes Neg Hx        Social History     Socioeconomic History   • Marital status:      Spouse name: Not on file   • Number of children: Not on file   • Years of education: Not on file   • Highest education level: Not on file   Occupational History   • Not on file   Tobacco Use   • Smoking status: Former Smoker     Packs/day: 1.00     Years: 6.00     Pack years: 6.00     Types: Cigarettes     Start date: 1972     Quit date: 1978     Years since quittin.3   • Smokeless tobacco: Never Used   • Tobacco comment:    Vaping Use   • Vaping Use: Never used   Substance and Sexual Activity   • Alcohol use: No     Alcohol/week: 0.0 oz   • Drug use: No   • Sexual activity: Not on file   Other Topics Concern   • Not on file   Social History Narrative    ** Merged History Encounter **          Social Determinants of Health     Financial Resource Strain: Not on file   Food Insecurity: Not on file   Transportation Needs: Not on file   Physical Activity: Not on file   Stress: Not on file   Social Connections: Not on file   Intimate Partner Violence: Not on file   Housing Stability: Not on file       Current Outpatient Medications   Medication Sig Dispense Refill   • fexofenadine (ALLEGRA) 180 MG tablet Take 180 mg by mouth every day.     • Docusate Calcium (STOOL SOFTENER PO) Take  by mouth 2 Times a Day.     • Multiple Vitamins-Minerals (PRESERVISION AREDS 2 PO) Take  by mouth 2 Times  "a Day.     • benzonatate (TESSALON) 100 MG Cap Take 100 mg by mouth 3 times a day as needed for Cough.     • amLODIPine (NORVASC) 10 MG Tab TAKE 1 TABLET BY MOUTH ONCE DAILY. REPLACES 5 MG  2   • ipratropium-albuterol (DUONEB) 0.5-2.5 (3) MG/3ML nebulizer solution 3 mL by Nebulization route 4 times a day.     • gabapentin (NEURONTIN) 300 MG Cap Take 300 mg by mouth 4 times a day.     • fluticasone-salmeterol (ADVAIR) 500-50 MCG/DOSE AEROSOL POWDER, BREATH ACTIVATED Inhale 1 Puff by mouth every 12 hours.     • multivitamin (THERAGRAN) Tab Take 1 Tab by mouth every day.     • Multiple Vitamins-Minerals (HAIR SKIN AND NAILS FORMULA) Tab Take 1 Tab by mouth every day.     • duloxetine (CYMBALTA) 60 MG Cap DR Particles delayed-release capsule Take 60 mg by mouth every day.     • albuterol 108 (90 Base) MCG/ACT Aero Soln inhalation aerosol Inhale 2 Puffs by mouth every 6 hours as needed for Shortness of Breath. Shortness of breath     • losartan (COZAAR) 100 MG TABS Take 100 mg by mouth every day.     • nystatin (MYCOSTATIN) 296079 UNIT/ML SUSP Take 100,000 Units by mouth 5 Times a Day.     • montelukast (SINGULAIR) 10 MG TABS Take 10 mg by mouth every morning.     • trazodone (DESYREL) 50 MG TABS Take 50 mg by mouth every evening.     • omeprazole (PRILOSEC) 20 MG CPDR Take 20 mg by mouth 2 times a day.     • Azelastine HCl 137 MCG/SPRAY Solution USE 2 SPRAY(S) IN EACH NOSTRIL TWICE DAILY (Patient not taking: No sig reported)     • Amoxicillin 500 MG Tab Take  by mouth. Prior to dental (Patient not taking: No sig reported)     • fluticasone (FLONASE) 50 MCG/ACT nasal spray Spray 1 Spray in nose every day.     • hydrocodone-acetaminophen (NORCO) 5-325 MG Tab per tablet Take 1-2 Tabs by mouth every 6 hours as needed. (Patient not taking: No sig reported)       No current facility-administered medications for this visit.    \"CURRENT RX\"    ALLERGIES: Allergy, Cefepime, Cephalosporins, Clarithromycin, Cleocin [clindamycin], " "Meropenem, Morphine, and Sulfamethoxazole-trimethoprim    ROS    Constitutional: Denies fever, chills, sweats,  weight loss, fatigue  Cardiovascular: Denies chest pain, tightness, palpitations, swelling in legs/feet, fainting, difficulty breathing when lying down but gets better when sitting up.   Respiratory: Denies shortness of breath, cough, sputum, wheezing, painful breathing, coughing up blood.   Sleep: per HPI  Gastrointestinal: Denies  difficulty swallowing, nausea, abdominal pain, diarrhea, constipation, heartburn.  Musculoskeletal: Denies painful joints, sore muscles, back pain.        PHYSICAL EXAM  Obese    /78 (BP Location: Right arm, Patient Position: Sitting, BP Cuff Size: Adult)   Pulse 86   Resp 16   Ht 1.549 m (5' 1\")   Wt 122 kg (270 lb)   SpO2 94%   BMI 51.02 kg/m²   Appearance: Well-nourished, well-developed, no acute distress  Eyes:  PERRLA, EOMI  ENMT: masked  Neck: Supple, trachea midline, no masses  Respiratory effort:  No intercostal retractions or use of accessory muscles  Lung auscultation:  No wheezes rhonchi rubs or rales  Cardiac: No murmurs, rubs, or gallops; regular rhythm, normal rate; no edema  Abdomen:  No tenderness, no organomegaly.  Obese  Musculoskeletal:  Grossly normal; gait and station normal; digits and nails normal  Skin:  No rashes, petechiae, cyanosis  Neurologic: without focal signs; oriented to person, time, place, and purpose; judgement intact  Psychiatric:  No depression, anxiety, agitation      Medical Decision Making       The medical record was reviewed in its entirety including the referral notes, records from primary care, consultants notes, hospital records, lab, imaging, microbiology, immunology, and immunizations.  Care gaps identified and reviewed with the patient.    Diagnostic and titration nocturnal polysomnogram's, home sleep apnea tests, continuous nocturnal oximetry results, multiple sleep latency tests, and compliance reports reviewed.  1. " BEATRIZ (obstructive sleep apnea)  - DME Mask and Supplies      PLAN:   Has been advised to continue the current CPAP 14, clean equipment frequently, and get new mask and supplies as allowed by insurance and DME. Advised about potential problems including nasal obstruction/stuffiness, mask leak or discomfort , frequent awakenings, chill or dryness of the upper airway, noise, inconvenience, and lack of benefit. Recommend an earlier appointment, if significant treatment barriers develop.    The risks of untreated sleep apnea were discussed with the patient at length. Patients with BEATRIZ are at increased risk of cardiovascular disease including coronary artery disease, systemic arterial hypertension, pulmonary arterial hypertension, cardiac arrythmias, and stroke. BEATRIZ patients have an increased risk of motor vehicle accidents, type 2 diabetes, chronic kidney disease, and non-alcoholic liver disease. The patient was advised to avoid driving a motor vehicle when drowsy.    Positive airway pressure will favorably impact many of the adverse conditions and effects provoked by BEATRIZ.    Have advised the patient to follow up with the appropriate healthcare practitioners for all other medical problems and issues.    N95 mask wearing, handwashing, and social distancing protocols reviewed and encouraged.    Return in about 1 year (around 5/11/2023).    Total time 30 minutes      She declined a DME order for mask and supplies because she is over-served already.

## 2022-09-14 ENCOUNTER — HOSPITAL ENCOUNTER (OUTPATIENT)
Dept: RADIOLOGY | Facility: MEDICAL CENTER | Age: 77
End: 2022-09-14
Attending: FAMILY MEDICINE
Payer: MEDICARE

## 2022-09-14 DIAGNOSIS — Z12.31 VISIT FOR SCREENING MAMMOGRAM: ICD-10-CM

## 2022-09-14 PROCEDURE — 77063 BREAST TOMOSYNTHESIS BI: CPT

## 2022-09-27 ENCOUNTER — HOSPITAL ENCOUNTER (OUTPATIENT)
Dept: LAB | Facility: MEDICAL CENTER | Age: 77
End: 2022-09-27
Attending: FAMILY MEDICINE
Payer: MEDICARE

## 2022-09-27 LAB
ALBUMIN SERPL BCP-MCNC: 3.8 G/DL (ref 3.2–4.9)
ALBUMIN/GLOB SERPL: 1.3 G/DL
ALP SERPL-CCNC: 73 U/L (ref 30–99)
ALT SERPL-CCNC: 11 U/L (ref 2–50)
ANION GAP SERPL CALC-SCNC: 10 MMOL/L (ref 7–16)
AST SERPL-CCNC: 14 U/L (ref 12–45)
BASOPHILS # BLD AUTO: 0.8 % (ref 0–1.8)
BASOPHILS # BLD: 0.05 K/UL (ref 0–0.12)
BILIRUB SERPL-MCNC: 0.5 MG/DL (ref 0.1–1.5)
BUN SERPL-MCNC: 13 MG/DL (ref 8–22)
CALCIUM SERPL-MCNC: 8.7 MG/DL (ref 8.5–10.5)
CHLORIDE SERPL-SCNC: 106 MMOL/L (ref 96–112)
CHOLEST SERPL-MCNC: 147 MG/DL (ref 100–199)
CO2 SERPL-SCNC: 24 MMOL/L (ref 20–33)
CREAT SERPL-MCNC: 0.99 MG/DL (ref 0.5–1.4)
EOSINOPHIL # BLD AUTO: 0.26 K/UL (ref 0–0.51)
EOSINOPHIL NFR BLD: 4 % (ref 0–6.9)
ERYTHROCYTE [DISTWIDTH] IN BLOOD BY AUTOMATED COUNT: 50 FL (ref 35.9–50)
FASTING STATUS PATIENT QL REPORTED: NORMAL
GFR SERPLBLD CREATININE-BSD FMLA CKD-EPI: 59 ML/MIN/1.73 M 2
GLOBULIN SER CALC-MCNC: 3 G/DL (ref 1.9–3.5)
GLUCOSE SERPL-MCNC: 113 MG/DL (ref 65–99)
HCT VFR BLD AUTO: 40.1 % (ref 37–47)
HDLC SERPL-MCNC: 42 MG/DL
HGB BLD-MCNC: 13.3 G/DL (ref 12–16)
IMM GRANULOCYTES # BLD AUTO: 0.02 K/UL (ref 0–0.11)
IMM GRANULOCYTES NFR BLD AUTO: 0.3 % (ref 0–0.9)
LDLC SERPL CALC-MCNC: 85 MG/DL
LYMPHOCYTES # BLD AUTO: 1.58 K/UL (ref 1–4.8)
LYMPHOCYTES NFR BLD: 24.3 % (ref 22–41)
MCH RBC QN AUTO: 30.5 PG (ref 27–33)
MCHC RBC AUTO-ENTMCNC: 33.2 G/DL (ref 33.6–35)
MCV RBC AUTO: 92 FL (ref 81.4–97.8)
MONOCYTES # BLD AUTO: 0.56 K/UL (ref 0–0.85)
MONOCYTES NFR BLD AUTO: 8.6 % (ref 0–13.4)
NEUTROPHILS # BLD AUTO: 4.04 K/UL (ref 2–7.15)
NEUTROPHILS NFR BLD: 62 % (ref 44–72)
NRBC # BLD AUTO: 0 K/UL
NRBC BLD-RTO: 0 /100 WBC
PLATELET # BLD AUTO: 241 K/UL (ref 164–446)
PMV BLD AUTO: 10 FL (ref 9–12.9)
POTASSIUM SERPL-SCNC: 3.7 MMOL/L (ref 3.6–5.5)
PROT SERPL-MCNC: 6.8 G/DL (ref 6–8.2)
RBC # BLD AUTO: 4.36 M/UL (ref 4.2–5.4)
SODIUM SERPL-SCNC: 140 MMOL/L (ref 135–145)
TRIGL SERPL-MCNC: 99 MG/DL (ref 0–149)
TSH SERPL DL<=0.005 MIU/L-ACNC: 1.93 UIU/ML (ref 0.38–5.33)
WBC # BLD AUTO: 6.5 K/UL (ref 4.8–10.8)

## 2022-09-27 PROCEDURE — 80053 COMPREHEN METABOLIC PANEL: CPT

## 2022-09-27 PROCEDURE — 85025 COMPLETE CBC W/AUTO DIFF WBC: CPT

## 2022-09-27 PROCEDURE — 36415 COLL VENOUS BLD VENIPUNCTURE: CPT

## 2022-09-27 PROCEDURE — 84443 ASSAY THYROID STIM HORMONE: CPT

## 2022-09-27 PROCEDURE — 80061 LIPID PANEL: CPT

## 2022-10-05 ENCOUNTER — APPOINTMENT (OUTPATIENT)
Dept: URGENT CARE | Facility: CLINIC | Age: 77
End: 2022-10-05
Payer: MEDICARE

## 2022-11-21 ENCOUNTER — DOCUMENTATION (OUTPATIENT)
Dept: HEALTH INFORMATION MANAGEMENT | Facility: OTHER | Age: 77
End: 2022-11-21
Payer: MEDICARE

## 2023-04-04 PROBLEM — N18.31 CHRONIC KIDNEY DISEASE (CKD) STAGE G3A/A1, MODERATELY DECREASED GLOMERULAR FILTRATION RATE (GFR) BETWEEN 45-59 ML/MIN/1.73 SQUARE METER AND ALBUMINURIA CREATININE RATIO LESS THAN 30 MG/G (HCC): Status: ACTIVE | Noted: 2023-04-04

## 2023-04-04 PROBLEM — F11.20 OPIOID TYPE DEPENDENCE, EPISODIC (HCC): Status: RESOLVED | Noted: 2023-04-04 | Resolved: 2023-04-04

## 2023-04-04 PROBLEM — E83.39 HYPOPHOSPHATEMIA: Status: ACTIVE | Noted: 2023-04-04

## 2023-04-04 PROBLEM — F33.0 MILD EPISODE OF RECURRENT MAJOR DEPRESSIVE DISORDER (HCC): Status: ACTIVE | Noted: 2018-08-27

## 2023-04-04 PROBLEM — H35.3210 EXUDATIVE AGE-RELATED MACULAR DEGENERATION OF RIGHT EYE (HCC): Status: ACTIVE | Noted: 2023-04-04

## 2023-04-04 PROBLEM — F11.20 OPIOID TYPE DEPENDENCE, EPISODIC (HCC): Status: ACTIVE | Noted: 2023-04-04

## 2023-05-09 NOTE — PROGRESS NOTES
[facility] Follow-up Visit    Date of Visit: 5/22/2023     CC:  Follow-up for BEATRIZ management      HPI:  Cyndy Petit is a very pleasant 77 y.o. year old female former smoker (6 pack-years, quit in 1978), with a PMHx of BEATRIZ, elevated BMI, environmental allergies, asthma, chronic back pain, HTN, GERD who presented to the [facility] for a regular follow up. Last seen in the office on 5/11/2022 with Dr. Hollingsworth.    Patient presents for a annual compliance.  Patient states she has daytime drowsiness, issues falling asleep, mask leak, and dry mouth which water in the humidifier on the machine alleviate.  Patient denies any significant morning headaches, driving drowsy, snoring, gasping, apneas, palpitations, aerophagia, or skin irritation.  Patient goes to bed between 8-10 PM and wakes up at 6 AM.  She has 3-4 awakenings at night to use bathroom but does not have any issues falling back asleep.  She will occasionally nap for about 30 minutes.      DME provider: [facility]  Device: ???  Settings:  AutoCPAP 15-18  When:3/2021  Mask: FFM   Chin strap: No     Cleaning regimen: Once a week with dish soap and water    Compliance:  Compliance was unable to obtain today at the office visit.  Several attempts were made to reach DME company, but unfortunately there was no answer.      Sleep History:  PSG titration 3/30/2017-        Patient Active Problem List    Diagnosis Date Noted    BEATRIZ on CPAP 12/06/2016    Chronic kidney disease (CKD) stage G3a/A1, moderately decreased glomerular filtration rate (GFR) between 45-59 mL/min/1.73 square meter and albuminuria creatinine ratio less than 30 mg/g ([facility]) 04/04/2023    Hypophosphatemia 04/04/2023    Exudative age-related macular degeneration of right eye ([facility]) 04/04/2023    History of chronic kidney disease 02/22/2022    Pre-diabetes 02/22/2022    BMI 50.0-59.9, adult ([facility]) 02/22/2022    Morbid obesity with BMI of 50.0-59.9, adult ([facility]) 02/22/2022     Mucopurulent chronic bronchitis (HCC) 02/22/2022    Early dry stage nonexudative age-related macular degeneration of both eyes 02/22/2022    Degenerative disc disease, lumbar 02/22/2022    Age-related osteoporosis without current pathological fracture 02/22/2022    Chest pain 11/25/2018    Subacute frontal sinusitis 11/25/2018    Mild episode of recurrent major depressive disorder (HCC) 08/27/2018    Chronic insomnia 08/27/2018    Elevated troponin 11/14/2017    Hypertensive urgency 11/14/2017    Moderate persistent asthma without complication 12/06/2016    Seasonal allergies 12/06/2016    Hypokalemia 03/10/2016    H/O knee surgery 03/10/2016    Pain and swelling of left knee 03/10/2016    Primary osteoarthritis of left knee 03/07/2016    Lumbosacral radiculopathy at S1 05/31/2013    S/P laminectomy 05/30/2013    Hyponatremia 05/25/2013    GERD (gastroesophageal reflux disease) 05/25/2013    HTN (hypertension) 05/25/2013    Back pain 05/24/2013     Past Medical History:   Diagnosis Date    Allergic rhinitis     Apnea, sleep     Arthritis     Asthma     inhalers    Back pain     Bowel habit changes     constipation    Bronchiectasis (HCC)     Bronchitis     Carpal tunnel syndrome     Chest pain     Chest tightness     Cold 4-8-2017    cold; taking antibiotics  4-    Constipation     Cough     Daytime sleepiness     Depression     Difficulty breathing     Difficulty swallowing     DJD (degenerative joint disease)     Frequent urination     GERD (gastroesophageal reflux disease)     GERD (gastroesophageal reflux disease)     Hearing difficulty     Heart burn     Heartburn     Hiatus hernia syndrome     Hyperglycemia 5/31/2013    Hypertension     Influenza     Insomnia     Lumps on the skin     Nasal drainage     Obesity     Obstructive sleep apnea     Pain     back, knees    Painful joint     Palpitations     Pneumonia     Pulmonary hypertension (HCC)     Rash     Rhinitis     Shortness of breath     Skin change  "    Sleep apnea     CPAP    Snoring     Sore muscles     Sore throat, chronic     Sorethroat 2/09/2016    Swelling of lower extremity     Thrush     from \"Advair\", takes nystatin    Tonsillitis     Urinary incontinence     Wears glasses     Wheezing       Past Surgical History:   Procedure Laterality Date    SINUS WASHING Bilateral 4/22/2021    Procedure: IRRIGATION, PARANASAL SINUS - FOR INTRAOP POSTOP SINUS DEBRIDEMENT.;  Surgeon: Yon Roajs M.D.;  Location: SURGERY SAME DAY [facility];  Service: Ent    WA REPAIR OF NASAL SEPTUM  4/15/2021    Procedure: SEPTOPLASTY, NOSE;  Surgeon: Yon Rojas M.D.;  Location: SURGERY SAME DAY [facility];  Service: Ent    ENDOSCOPY, PARANASAL SINUS, WITH TOTAL ETHMOIDECTOMY, SS Bilateral 4/15/2021    Procedure: ENDOSCOPY, PARANASAL SINUS, WITH TOTAL ETHMOIDECTOMY, SPHENOIDOTOMY, MAXILLARY ANTROSTOMY, MAXILLARY SINUS TISS REM, AND EXPLOR FRONT SINUS;  Surgeon: Yon Rojas M.D.;  Location: SURGERY SAME DAY [facility];  Service: Ent    STEREOTACTIC COMPUTER ASSISTED PROCEDURE CRANIAL, EXTRADURAL  4/15/2021    Procedure: STEREOTACTIC COMPUTER ASSISTED PROCEDURE CRANIAL,EXTRADURAL;  Surgeon: Yon Rojas M.D.;  Location: SURGERY SAME DAY [facility];  Service: Ent    SINUS TREPHINE FRONTAL  4/15/2021    Procedure: SINUSOTOMY, FRONTAL SINUS, USING TREPHINE-TYPE DEVICE - ENDOSCOPIC.;  Surgeon: Yon Rojas M.D.;  Location: SURGERY SAME DAY [facility];  Service: Ent    KNEE ARTHROPLASTY TOTAL Right 5/1/2017    Procedure: KNEE ARTHROPLASTY TOTAL;  Surgeon: Jesús Rutledge M.D.;  Location: SURGERY [facility];  Service:     KNEE ARTHROPLASTY TOTAL Left 3/7/2016    Procedure: KNEE ARTHROPLASTY TOTAL;  Surgeon: Jesús Rutledge M.D.;  Location: SURGERY [facility];  Service:     FUSION, SPINE, LUMBAR, PLIF  9/13/2013    Performed by Kaushal Ayala M.D. at SURGERY [facility]    LUMBAR LAMINECTOMY DISKECTOMY  9/13/2013    Performed by Kaushal Ayala M.D. at SURGERY " [facility]    LUMBAR LAMINECTOMY DISKECTOMY  2013    Performed by Kaushal Ayala M.D. at SURGERY [facility]    CHOLECYSTECTOMY  2010    laparoscopic    OTHER NEUROLOGICAL SURG  2009    I&D of brain from sinus infection    FORAMINOTOMY  08    Performed by [facility] at SURGERY [facility]    HARDWARE REMOVAL NEURO  08    Performed by [facility] at SURGERY [facility]    KNEE ARTHROSCOPY Right 2008    FUSION, SPINE, LUMBAR, PLIF  2005    CARPAL TUNNEL RELEASE Right 2000    SHOULDER ARTHROTOMY Right 2000    ARTHROSCOPY, KNEE      EYE SURGERY Bilateral     Cataract surgery 2018, 3/2018    LAMINOTOMY      GA REMV 2ND CATARACT,CORN-SCLER SECTN      TONSILLECTOMY       Family History   Problem Relation Age of Onset    Heart Disease Father     Arthritis Father     Lung Disease Father         asthma    Psychiatric Illness Father         depression    Alcohol/Drug Father     Depression Father     Hypertension Mother     Cancer Mother     Heart Disease Mother     Arthritis Mother     Stroke Mother     Hyperlipidemia Other     Alcohol/Drug Brother     Depression Daughter     Drug abuse Son         in remission    Bipolar disorder Grandchild     Genetic Disorder Neg Hx     Diabetes Neg Hx      Social History     Socioeconomic History    Marital status:      Spouse name: Not on file    Number of children: Not on file    Years of education: Not on file    Highest education level: Not on file   Occupational History    Not on file   Tobacco Use    Smoking status: Former     Packs/day: 1.00     Years: 6.00     Pack years: 6.00     Types: Cigarettes     Start date: 1972     Quit date: 1978     Years since quittin.4    Smokeless tobacco: Never    Tobacco comments:        Vaping Use    Vaping Use: Never used   Substance and Sexual Activity    Alcohol use: No     Alcohol/week: 0.0 oz    Drug use: No    Sexual activity: Not on file   Other Topics Concern    Not on  file   Social History Narrative    ** Merged History Encounter **          Social Determinants of Health     Financial Resource Strain: Not on file   Food Insecurity: Not on file   Transportation Needs: Not on file   Physical Activity: Not on file   Stress: Not on file   Social Connections: Not on file   Intimate Partner Violence: Not on file   Housing Stability: Not on file     Current Outpatient Medications   Medication Sig Dispense Refill    fexofenadine (ALLEGRA) 180 MG tablet Take 180 mg by mouth every day.      Docusate Calcium (STOOL SOFTENER PO) Take  by mouth 2 Times a Day.      Multiple Vitamins-Minerals (PRESERVISION AREDS 2 PO) Take  by mouth 2 Times a Day.      benzonatate (TESSALON) 100 MG Cap Take 100 mg by mouth 3 times a day as needed for Cough.      amLODIPine (NORVASC) 10 MG Tab TAKE 1 TABLET BY MOUTH ONCE DAILY. REPLACES 5 MG  2    ipratropium-albuterol (DUONEB) 0.5-2.5 (3) MG/3ML nebulizer solution 3 mL by Nebulization route 4 times a day.      gabapentin (NEURONTIN) 300 MG Cap Take 300 mg by mouth 4 times a day.      fluticasone-salmeterol (ADVAIR) 500-50 MCG/DOSE AEROSOL POWDER, BREATH ACTIVATED Inhale 1 Puff by mouth every 12 hours.      multivitamin (THERAGRAN) Tab Take 1 Tab by mouth every day.      Multiple Vitamins-Minerals (HAIR SKIN AND NAILS FORMULA) Tab Take 1 Tab by mouth every day.      duloxetine (CYMBALTA) 60 MG Cap DR Particles delayed-release capsule Take 60 mg by mouth every day.      albuterol 108 (90 Base) MCG/ACT Aero Soln inhalation aerosol Inhale 2 Puffs by mouth every 6 hours as needed for Shortness of Breath. Shortness of breath      losartan (COZAAR) 100 MG TABS Take 100 mg by mouth every day.      nystatin (MYCOSTATIN) 044575 UNIT/ML SUSP Take 100,000 Units by mouth 5 Times a Day.      montelukast (SINGULAIR) 10 MG TABS Take 10 mg by mouth every morning.      trazodone (DESYREL) 50 MG TABS Take 50 mg by mouth every evening.      omeprazole (PRILOSEC) 20 MG CPDR Take 20  "mg by mouth 2 times a day.      Azelastine HCl 137 MCG/SPRAY Solution USE 2 SPRAY(S) IN EACH NOSTRIL TWICE DAILY (Patient not taking: No sig reported)      Amoxicillin 500 MG Tab Take  by mouth. Prior to dental (Patient not taking: Reported on 10/12/2021)      fluticasone (FLONASE) 50 MCG/ACT nasal spray Spray 1 Spray in nose every day.       No current facility-administered medications for this visit.      ALLERGIES: Allergy, Cefepime, Cephalosporins, Clarithromycin, Cleocin [clindamycin], Meropenem, Morphine, and Sulfamethoxazole-trimethoprim    ROS:  Constitutional: Denies fever, chills, sweats,  weight loss, fatigue  Cardiovascular: Denies chest pain, tightness, palpitations, swelling in legs/feet  Respiratory: Denies shortness of breath, cough, sputum, wheezing, painful breathing   Sleep: per HPI  Gastrointestinal: Denies  difficulty swallowing, nausea, abdominal pain, diarrhea, constipation, heartburn.  Musculoskeletal: Denies painful joints, sore muscles,       PHYSICAL EXAM:  /78 (BP Location: Left arm, Patient Position: Sitting, BP Cuff Size: Large adult)   Pulse 77   Resp 16   Ht 1.549 m (5' 1\")   Wt 121 kg (266 lb)   SpO2 93% Comment: room air at rest  BMI 50.26 kg/m²   Appearance: Well-nourished, well-developed, no acute distress  Eyes:  No scleral icterus , EOMI  ENMT: No redness of the oropharynx  Lung auscultation:  No wheezes rhonchi rubs or rales  Cardiac: No murmurs, rubs, or gallops; regular rhythm, normal rate; no edema  Musculoskeletal:  Grossly normal; gait and station normal; digits and nails normal  Skin:  No rashes, petechiae, cyanosis  Neurologic: without focal signs; oriented to person, time, place, and purpose; judgement intact  Psychiatric:  No depression, anxiety, agitation  Mallampati score: Class IV    Assessment and Plan:    The medical record was reviewed.    Diagnostic and titration nocturnal polysomnogram's, home sleep apnea tests, continuous nocturnal oximetry " results, multiple sleep latency tests, and compliance reports reviewed.    Problem List Items Addressed This Visit       BEATRIZ on CPAP     Sleep Apnea:    The pathophysiology of sleep anea and the increased risk of cardiovascular morbidity from untreated sleep apnea is discussed in detail with the patient.  Urged to avoid supine sleep, weight gain and alcoholic beverages since all of these can worsen sleep apnea. Cautioned against drowsy driving. If feeling sleepy while driving, pull over for a break/nap, rather than persist on the road, in the interest of own safety and that of others on the road.    Compliance was unable to obtain today at the office visit.  Several attempts were made to reach [employer] company, but unfortunately there was no answer.    Once compliance can be obtained, provider will call patient to review compliance.  If patient is compliant, a prescription for mask and supplies will be sent.    - Compliance was reinforced  - Recommended the patient against the use of Ozone , such as SoClean  - Clean supplies a couple times a week with dish soap and water and air dry  - Recommended the patient change out supplies as recommended for best mask fit and usage of the machine  - Advised patient to reach out via [employer] if any questions or concerns should arise.   - Equipment replacement schedule:  Mask cushion every month  Nasal pillows 2 times per month  Mask every 6 months  Head gear every 6 months  Tubing every 3 months  Ultra-fine filters 2 times per month  Foam filter every 6 months  Humidifier chamber every 6 months  Chin strap every 6 months    Has been advised to continue the current CPAP, clean equipment frequently, and get new mask and supplies as allowed by insurance and DME. Recommend an earlier appointment, if significant treatment barriers develop.    The risks of untreated sleep apnea were discussed with the patient at length. Patients with sleep apnea are at increased risk of  cardiovascular disease including coronary artery disease, systemic arterial hypertension, pulmonary arterial hypertension, cardiac arrythmias, and stroke. The patient was advised to avoid driving a motor vehicle when drowsy.    Positive airway pressure will favorably impact many of the adverse conditions and effects provoked by sleep apnea.            Have advised the patient to follow up with the appropriate healthcare practitioners for all other medical problems and issues.    No follow-ups on file.    Please note portions of this record was created using voice recognition software. I have made every reasonable attempt to correct obvious errors, but I expect that there are errors of grammar and possibly content I did not discover before finalizing the note.    Time spent in record review prior to patient arrival, reviewing results, and in face-to-face encounter totaled 14 min.  __________  RODO Funk  Pulmonary & Sleep Medicine  [facility]

## 2023-05-22 ENCOUNTER — OFFICE VISIT (OUTPATIENT)
Dept: SLEEP MEDICINE | Facility: MEDICAL CENTER | Age: 78
End: 2023-05-22
Payer: MEDICARE

## 2023-05-22 VITALS
OXYGEN SATURATION: 93 % | DIASTOLIC BLOOD PRESSURE: 78 MMHG | RESPIRATION RATE: 16 BRPM | BODY MASS INDEX: 50.22 KG/M2 | SYSTOLIC BLOOD PRESSURE: 128 MMHG | WEIGHT: 266 LBS | HEIGHT: 61 IN | HEART RATE: 77 BPM

## 2023-05-22 DIAGNOSIS — G47.33 OSA ON CPAP: ICD-10-CM

## 2023-05-22 PROCEDURE — 99212 OFFICE O/P EST SF 10 MIN: CPT

## 2023-05-22 PROCEDURE — 99213 OFFICE O/P EST LOW 20 MIN: CPT

## 2023-05-22 PROCEDURE — 3078F DIAST BP <80 MM HG: CPT

## 2023-05-22 PROCEDURE — 3074F SYST BP LT 130 MM HG: CPT

## 2023-05-22 ASSESSMENT — FIBROSIS 4 INDEX: FIB4 SCORE: 1.35

## 2023-05-23 NOTE — ASSESSMENT & PLAN NOTE
Sleep Apnea:    The pathophysiology of sleep anea and the increased risk of cardiovascular morbidity from untreated sleep apnea is discussed in detail with the patient.  Urged to avoid supine sleep, weight gain and alcoholic beverages since all of these can worsen sleep apnea. Cautioned against drowsy driving. If feeling sleepy while driving, pull over for a break/nap, rather than persist on the road, in the interest of own safety and that of others on the road.    Compliance was unable to obtain today at the office visit.  Several attempts were made to reach DME company, but unfortunately there was no answer.    Once compliance can be obtained, provider will call patient to review compliance.  If patient is compliant, a prescription for mask and supplies will be sent.    - Compliance was reinforced  - Recommended the patient against the use of Ozone , such as SoClean  - Clean supplies a couple times a week with dish soap and water and air dry  - Recommended the patient change out supplies as recommended for best mask fit and usage of the machine  - Advised patient to reach out via [employer] if any questions or concerns should arise.   - Equipment replacement schedule:  Mask cushion every month  Nasal pillows 2 times per month  Mask every 6 months  Head gear every 6 months  Tubing every 3 months  Ultra-fine filters 2 times per month  Foam filter every 6 months  Humidifier chamber every 6 months  Chin strap every 6 months    Has been advised to continue the current CPAP, clean equipment frequently, and get new mask and supplies as allowed by insurance and DME. Recommend an earlier appointment, if significant treatment barriers develop.    The risks of untreated sleep apnea were discussed with the patient at length. Patients with sleep apnea are at increased risk of cardiovascular disease including coronary artery disease, systemic arterial hypertension, pulmonary arterial hypertension, cardiac arrythmias, and  stroke. The patient was advised to avoid driving a motor vehicle when drowsy.    Positive airway pressure will favorably impact many of the adverse conditions and effects provoked by sleep apnea.

## 2023-07-10 ENCOUNTER — TELEPHONE (OUTPATIENT)
Dept: SLEEP MEDICINE | Facility: MEDICAL CENTER | Age: 78
End: 2023-07-10
Payer: MEDICARE

## 2023-07-10 NOTE — TELEPHONE ENCOUNTER
Patient came into the clinic on 7/10/23 with CPAP chip to get downloaded and requested a copy of her compliance report. Downloaded SD card and scanned compliance report into the media.

## 2023-07-19 ENCOUNTER — HOSPITAL ENCOUNTER (OUTPATIENT)
Dept: LAB | Facility: MEDICAL CENTER | Age: 78
End: 2023-07-19
Attending: FAMILY MEDICINE
Payer: MEDICARE

## 2023-07-19 ENCOUNTER — TELEPHONE (OUTPATIENT)
Dept: SLEEP MEDICINE | Facility: MEDICAL CENTER | Age: 78
End: 2023-07-19

## 2023-07-19 LAB
BASOPHILS # BLD AUTO: 0.9 % (ref 0–1.8)
BASOPHILS # BLD: 0.06 K/UL (ref 0–0.12)
EOSINOPHIL # BLD AUTO: 0.35 K/UL (ref 0–0.51)
EOSINOPHIL NFR BLD: 5.1 % (ref 0–6.9)
ERYTHROCYTE [DISTWIDTH] IN BLOOD BY AUTOMATED COUNT: 49.4 FL (ref 35.9–50)
HCT VFR BLD AUTO: 41.8 % (ref 37–47)
HGB BLD-MCNC: 13.7 G/DL (ref 12–16)
IMM GRANULOCYTES # BLD AUTO: 0.02 K/UL (ref 0–0.11)
IMM GRANULOCYTES NFR BLD AUTO: 0.3 % (ref 0–0.9)
LYMPHOCYTES # BLD AUTO: 1.66 K/UL (ref 1–4.8)
LYMPHOCYTES NFR BLD: 24.4 % (ref 22–41)
MCH RBC QN AUTO: 30.2 PG (ref 27–33)
MCHC RBC AUTO-ENTMCNC: 32.8 G/DL (ref 32.2–35.5)
MCV RBC AUTO: 92.3 FL (ref 81.4–97.8)
MONOCYTES # BLD AUTO: 0.66 K/UL (ref 0–0.85)
MONOCYTES NFR BLD AUTO: 9.7 % (ref 0–13.4)
NEUTROPHILS # BLD AUTO: 4.06 K/UL (ref 1.82–7.42)
NEUTROPHILS NFR BLD: 59.6 % (ref 44–72)
NRBC # BLD AUTO: 0 K/UL
NRBC BLD-RTO: 0 /100 WBC (ref 0–0.2)
PLATELET # BLD AUTO: 212 K/UL (ref 164–446)
PMV BLD AUTO: 10.7 FL (ref 9–12.9)
RBC # BLD AUTO: 4.53 M/UL (ref 4.2–5.4)
TSH SERPL DL<=0.005 MIU/L-ACNC: 1.41 UIU/ML (ref 0.38–5.33)
WBC # BLD AUTO: 6.8 K/UL (ref 4.8–10.8)

## 2023-07-19 PROCEDURE — 84443 ASSAY THYROID STIM HORMONE: CPT

## 2023-07-19 PROCEDURE — 36415 COLL VENOUS BLD VENIPUNCTURE: CPT

## 2023-07-19 PROCEDURE — 85025 COMPLETE CBC W/AUTO DIFF WBC: CPT

## 2023-07-19 PROCEDURE — 80053 COMPREHEN METABOLIC PANEL: CPT

## 2023-07-19 PROCEDURE — 80061 LIPID PANEL: CPT

## 2023-07-19 NOTE — TELEPHONE ENCOUNTER
Patient came in stating she called [facility] and they haven't received DME order, if you can fax to [facility] and contact patient once it has been sent. Thank you

## 2023-07-20 DIAGNOSIS — G47.33 OSA ON CPAP: ICD-10-CM

## 2023-07-20 LAB
ALBUMIN SERPL BCP-MCNC: 3.7 G/DL (ref 3.2–4.9)
ALBUMIN/GLOB SERPL: 1.1 G/DL
ALP SERPL-CCNC: 64 U/L (ref 30–99)
ALT SERPL-CCNC: 18 U/L (ref 2–50)
ANION GAP SERPL CALC-SCNC: 11 MMOL/L (ref 7–16)
AST SERPL-CCNC: 22 U/L (ref 12–45)
BILIRUB SERPL-MCNC: 0.4 MG/DL (ref 0.1–1.5)
BUN SERPL-MCNC: 17 MG/DL (ref 8–22)
CALCIUM ALBUM COR SERPL-MCNC: 9.1 MG/DL (ref 8.5–10.5)
CALCIUM SERPL-MCNC: 8.9 MG/DL (ref 8.5–10.5)
CHLORIDE SERPL-SCNC: 105 MMOL/L (ref 96–112)
CHOLEST SERPL-MCNC: 161 MG/DL (ref 100–199)
CO2 SERPL-SCNC: 23 MMOL/L (ref 20–33)
CREAT SERPL-MCNC: 0.97 MG/DL (ref 0.5–1.4)
GFR SERPLBLD CREATININE-BSD FMLA CKD-EPI: 60 ML/MIN/1.73 M 2
GLOBULIN SER CALC-MCNC: 3.3 G/DL (ref 1.9–3.5)
GLUCOSE SERPL-MCNC: 89 MG/DL (ref 65–99)
HDLC SERPL-MCNC: 50 MG/DL
LDLC SERPL CALC-MCNC: 94 MG/DL
POTASSIUM SERPL-SCNC: 4.2 MMOL/L (ref 3.6–5.5)
PROT SERPL-MCNC: 7 G/DL (ref 6–8.2)
SODIUM SERPL-SCNC: 139 MMOL/L (ref 135–145)
TRIGL SERPL-MCNC: 84 MG/DL (ref 0–149)

## 2023-12-21 ENCOUNTER — HOSPITAL ENCOUNTER (OUTPATIENT)
Dept: LAB | Facility: MEDICAL CENTER | Age: 78
End: 2023-12-21
Attending: FAMILY MEDICINE
Payer: MEDICARE

## 2023-12-21 LAB
ALBUMIN SERPL BCP-MCNC: 3.7 G/DL (ref 3.2–4.9)
ALBUMIN/GLOB SERPL: 1.3 G/DL
ALP SERPL-CCNC: 66 U/L (ref 30–99)
ALT SERPL-CCNC: 15 U/L (ref 2–50)
ANION GAP SERPL CALC-SCNC: 9 MMOL/L (ref 7–16)
APPEARANCE UR: CLEAR
AST SERPL-CCNC: 12 U/L (ref 12–45)
BACTERIA #/AREA URNS HPF: NEGATIVE /HPF
BASOPHILS # BLD AUTO: 0.6 % (ref 0–1.8)
BASOPHILS # BLD: 0.04 K/UL (ref 0–0.12)
BILIRUB SERPL-MCNC: 0.6 MG/DL (ref 0.1–1.5)
BILIRUB UR QL STRIP.AUTO: NEGATIVE
BUN SERPL-MCNC: 15 MG/DL (ref 8–22)
CALCIUM ALBUM COR SERPL-MCNC: 8.8 MG/DL (ref 8.5–10.5)
CALCIUM SERPL-MCNC: 8.6 MG/DL (ref 8.5–10.5)
CHLORIDE SERPL-SCNC: 105 MMOL/L (ref 96–112)
CHOLEST SERPL-MCNC: 152 MG/DL (ref 100–199)
CO2 SERPL-SCNC: 27 MMOL/L (ref 20–33)
COLOR UR: YELLOW
CREAT SERPL-MCNC: 0.91 MG/DL (ref 0.5–1.4)
EOSINOPHIL # BLD AUTO: 0.31 K/UL (ref 0–0.51)
EOSINOPHIL NFR BLD: 4.9 % (ref 0–6.9)
EPI CELLS #/AREA URNS HPF: NEGATIVE /HPF
ERYTHROCYTE [DISTWIDTH] IN BLOOD BY AUTOMATED COUNT: 49.4 FL (ref 35.9–50)
GFR SERPLBLD CREATININE-BSD FMLA CKD-EPI: 65 ML/MIN/1.73 M 2
GLOBULIN SER CALC-MCNC: 2.8 G/DL (ref 1.9–3.5)
GLUCOSE SERPL-MCNC: 118 MG/DL (ref 65–99)
GLUCOSE UR STRIP.AUTO-MCNC: NEGATIVE MG/DL
HCT VFR BLD AUTO: 42.6 % (ref 37–47)
HDLC SERPL-MCNC: 45 MG/DL
HGB BLD-MCNC: 14 G/DL (ref 12–16)
HYALINE CASTS #/AREA URNS LPF: NORMAL /LPF
IMM GRANULOCYTES # BLD AUTO: 0.02 K/UL (ref 0–0.11)
IMM GRANULOCYTES NFR BLD AUTO: 0.3 % (ref 0–0.9)
KETONES UR STRIP.AUTO-MCNC: NEGATIVE MG/DL
LDLC SERPL CALC-MCNC: 86 MG/DL
LEUKOCYTE ESTERASE UR QL STRIP.AUTO: ABNORMAL
LYMPHOCYTES # BLD AUTO: 1.37 K/UL (ref 1–4.8)
LYMPHOCYTES NFR BLD: 21.7 % (ref 22–41)
MCH RBC QN AUTO: 30.4 PG (ref 27–33)
MCHC RBC AUTO-ENTMCNC: 32.9 G/DL (ref 32.2–35.5)
MCV RBC AUTO: 92.6 FL (ref 81.4–97.8)
MICRO URNS: ABNORMAL
MONOCYTES # BLD AUTO: 0.57 K/UL (ref 0–0.85)
MONOCYTES NFR BLD AUTO: 9 % (ref 0–13.4)
NEUTROPHILS # BLD AUTO: 3.99 K/UL (ref 1.82–7.42)
NEUTROPHILS NFR BLD: 63.5 % (ref 44–72)
NITRITE UR QL STRIP.AUTO: NEGATIVE
NRBC # BLD AUTO: 0 K/UL
NRBC BLD-RTO: 0 /100 WBC (ref 0–0.2)
PH UR STRIP.AUTO: 6.5 [PH] (ref 5–8)
PLATELET # BLD AUTO: 245 K/UL (ref 164–446)
PMV BLD AUTO: 10.4 FL (ref 9–12.9)
POTASSIUM SERPL-SCNC: 4.1 MMOL/L (ref 3.6–5.5)
PROT SERPL-MCNC: 6.5 G/DL (ref 6–8.2)
PROT UR QL STRIP: NEGATIVE MG/DL
RBC # BLD AUTO: 4.6 M/UL (ref 4.2–5.4)
RBC # URNS HPF: NORMAL /HPF
RBC UR QL AUTO: NEGATIVE
SODIUM SERPL-SCNC: 141 MMOL/L (ref 135–145)
SP GR UR STRIP.AUTO: 1.01
TRIGL SERPL-MCNC: 104 MG/DL (ref 0–149)
TSH SERPL DL<=0.005 MIU/L-ACNC: 1.6 UIU/ML (ref 0.38–5.33)
UROBILINOGEN UR STRIP.AUTO-MCNC: 0.2 MG/DL
WBC # BLD AUTO: 6.3 K/UL (ref 4.8–10.8)
WBC #/AREA URNS HPF: NORMAL /HPF

## 2023-12-21 PROCEDURE — 81001 URINALYSIS AUTO W/SCOPE: CPT

## 2023-12-21 PROCEDURE — 80053 COMPREHEN METABOLIC PANEL: CPT

## 2023-12-21 PROCEDURE — 80061 LIPID PANEL: CPT

## 2023-12-21 PROCEDURE — 36415 COLL VENOUS BLD VENIPUNCTURE: CPT

## 2023-12-21 PROCEDURE — 83519 RIA NONANTIBODY: CPT

## 2023-12-21 PROCEDURE — 85025 COMPLETE CBC W/AUTO DIFF WBC: CPT

## 2023-12-21 PROCEDURE — 83516 IMMUNOASSAY NONANTIBODY: CPT

## 2023-12-21 PROCEDURE — 84443 ASSAY THYROID STIM HORMONE: CPT

## 2023-12-23 LAB
ACHR BIND AB SER-SCNC: 0 NMOL/L (ref 0–0.4)
ACHR BLOCK AB/ACHR TOTAL SFR SER: 7 % (ref 0–26)

## 2024-07-26 ENCOUNTER — TELEPHONE (OUTPATIENT)
Dept: HEALTH INFORMATION MANAGEMENT | Facility: OTHER | Age: 79
End: 2024-07-26
Payer: MEDICARE

## 2024-08-02 ASSESSMENT — PATIENT HEALTH QUESTIONNAIRE - PHQ9
1. LITTLE INTEREST OR PLEASURE IN DOING THINGS: NOT AT ALL
2. FEELING DOWN, DEPRESSED, IRRITABLE, OR HOPELESS: SEVERAL DAYS

## 2024-08-02 ASSESSMENT — ACTIVITIES OF DAILY LIVING (ADL): BATHING_REQUIRES_ASSISTANCE: 0

## 2024-08-02 ASSESSMENT — ENCOUNTER SYMPTOMS: GENERAL WELL-BEING: FAIR

## 2024-08-05 ENCOUNTER — OFFICE VISIT (OUTPATIENT)
Dept: FAMILY PLANNING/WOMEN'S HEALTH CLINIC | Facility: PHYSICIAN GROUP | Age: 79
End: 2024-08-05
Payer: MEDICARE

## 2024-08-05 VITALS
BODY MASS INDEX: 52.87 KG/M2 | SYSTOLIC BLOOD PRESSURE: 126 MMHG | WEIGHT: 280 LBS | HEIGHT: 61 IN | DIASTOLIC BLOOD PRESSURE: 78 MMHG

## 2024-08-05 DIAGNOSIS — M81.0 AGE-RELATED OSTEOPOROSIS WITHOUT CURRENT PATHOLOGICAL FRACTURE: ICD-10-CM

## 2024-08-05 DIAGNOSIS — J41.1 MUCOPURULENT CHRONIC BRONCHITIS (HCC): ICD-10-CM

## 2024-08-05 DIAGNOSIS — N18.31 CHRONIC KIDNEY DISEASE (CKD) STAGE G3A/A1, MODERATELY DECREASED GLOMERULAR FILTRATION RATE (GFR) BETWEEN 45-59 ML/MIN/1.73 SQUARE METER AND ALBUMINURIA CREATININE RATIO LESS THAN 30 MG/G: ICD-10-CM

## 2024-08-05 DIAGNOSIS — I10 PRIMARY HYPERTENSION: ICD-10-CM

## 2024-08-05 DIAGNOSIS — E66.01 MORBID OBESITY WITH BMI OF 50.0-59.9, ADULT (HCC): ICD-10-CM

## 2024-08-05 DIAGNOSIS — H35.3210 EXUDATIVE AGE-RELATED MACULAR DEGENERATION OF RIGHT EYE, UNSPECIFIED STAGE (HCC): ICD-10-CM

## 2024-08-05 DIAGNOSIS — F33.0 MILD EPISODE OF RECURRENT MAJOR DEPRESSIVE DISORDER (HCC): ICD-10-CM

## 2024-08-05 DIAGNOSIS — K21.9 GASTROESOPHAGEAL REFLUX DISEASE, UNSPECIFIED WHETHER ESOPHAGITIS PRESENT: ICD-10-CM

## 2024-08-05 PROCEDURE — 3074F SYST BP LT 130 MM HG: CPT

## 2024-08-05 PROCEDURE — 1126F AMNT PAIN NOTED NONE PRSNT: CPT

## 2024-08-05 PROCEDURE — G0439 PPPS, SUBSEQ VISIT: HCPCS

## 2024-08-05 PROCEDURE — 3078F DIAST BP <80 MM HG: CPT

## 2024-08-05 RX ORDER — FAMOTIDINE 40 MG/1
40 TABLET, FILM COATED ORAL DAILY
COMMUNITY

## 2024-08-05 ASSESSMENT — PATIENT HEALTH QUESTIONNAIRE - PHQ9
5. POOR APPETITE OR OVEREATING: 2 - MORE THAN HALF THE DAYS
SUM OF ALL RESPONSES TO PHQ QUESTIONS 1-9: 13
CLINICAL INTERPRETATION OF PHQ2 SCORE: 1

## 2024-08-05 ASSESSMENT — ACTIVITIES OF DAILY LIVING (ADL): SHOPPING_COMMENTS: HUSBAND HELPS

## 2024-08-05 ASSESSMENT — PAIN SCALES - GENERAL: PAINLEVEL: NO PAIN

## 2024-08-05 ASSESSMENT — FIBROSIS 4 INDEX: FIB4 SCORE: 0.99

## 2024-08-05 NOTE — PROGRESS NOTES
Comprehensive Health Assessment Program     Cyndy Petit is a 78 y.o. here for her comprehensive health assessment.    Patient Active Problem List    Diagnosis Date Noted    Chronic kidney disease (CKD) stage G3a/A1, moderately decreased glomerular filtration rate (GFR) between 45-59 mL/min/1.73 square meter and albuminuria creatinine ratio less than 30 mg/g 04/04/2023    Hypophosphatemia 04/04/2023    Exudative age-related macular degeneration of right eye (HCC) 04/04/2023    History of chronic kidney disease 02/22/2022    Pre-diabetes 02/22/2022    BMI 50.0-59.9, adult ([facility]) 02/22/2022    Morbid obesity with BMI of 50.0-59.9, adult ([facility]) 02/22/2022    Mucopurulent chronic bronchitis ([facility]) 02/22/2022    Early dry stage nonexudative age-related macular degeneration of both eyes 02/22/2022    Degenerative disc disease, lumbar 02/22/2022    Age-related osteoporosis without current pathological fracture 02/22/2022    Chest pain 11/25/2018    Subacute frontal sinusitis 11/25/2018    Mild episode of recurrent major depressive disorder ([facility]) 08/27/2018    Chronic insomnia 08/27/2018    Elevated troponin 11/14/2017    Hypertensive urgency 11/14/2017    Moderate persistent asthma without complication 12/06/2016    BEATRIZ on CPAP 12/06/2016    Seasonal allergies 12/06/2016    Hypokalemia 03/10/2016    H/O knee surgery 03/10/2016    Pain and swelling of left knee 03/10/2016    Primary osteoarthritis of left knee 03/07/2016    Lumbosacral radiculopathy at S1 05/31/2013    S/P laminectomy 05/30/2013    Hyponatremia 05/25/2013    GERD (gastroesophageal reflux disease) 05/25/2013    HTN (hypertension) 05/25/2013    Back pain 05/24/2013       Current Outpatient Medications   Medication Sig Dispense Refill    famotidine (PEPCID) 40 MG Tab Take 40 mg by mouth every day.      Azelastine HCl 137 MCG/SPRAY Solution USE 2 SPRAY(S) IN EACH NOSTRIL TWICE DAILY (Patient not taking: No sig reported)      Amoxicillin 500 MG Tab  Take  by mouth. Prior to dental (Patient not taking: Reported on 10/12/2021)      fexofenadine (ALLEGRA) 180 MG tablet Take 180 mg by mouth every day.      Docusate Calcium (STOOL SOFTENER PO) Take  by mouth 2 Times a Day.      Multiple Vitamins-Minerals (PRESERVISION AREDS 2 PO) Take  by mouth 2 Times a Day.      benzonatate (TESSALON) 100 MG Cap Take 100 mg by mouth 3 times a day as needed for Cough.      amLODIPine (NORVASC) 10 MG Tab TAKE 1 TABLET BY MOUTH ONCE DAILY. REPLACES 5 MG  2    ipratropium-albuterol (DUONEB) 0.5-2.5 (3) MG/3ML nebulizer solution 3 mL by Nebulization route 4 times a day.      gabapentin (NEURONTIN) 300 MG Cap Take 300 mg by mouth 4 times a day. (Patient not taking: Reported on 8/5/2024)      fluticasone-salmeterol (ADVAIR) 500-50 MCG/DOSE AEROSOL POWDER, BREATH ACTIVATED Inhale 1 Puff by mouth every 12 hours.      multivitamin (THERAGRAN) Tab Take 1 Tab by mouth every day.      Multiple Vitamins-Minerals (HAIR SKIN AND NAILS FORMULA) Tab Take 1 Tab by mouth every day.      duloxetine (CYMBALTA) 60 MG Cap DR Particles delayed-release capsule Take 60 mg by mouth every day.      albuterol 108 (90 Base) MCG/ACT Aero Soln inhalation aerosol Inhale 2 Puffs by mouth every 6 hours as needed for Shortness of Breath. Shortness of breath      losartan (COZAAR) 100 MG TABS Take 100 mg by mouth every day.      nystatin (MYCOSTATIN) 445331 UNIT/ML SUSP Take 100,000 Units by mouth 5 Times a Day.      montelukast (SINGULAIR) 10 MG TABS Take 10 mg by mouth every morning.      trazodone (DESYREL) 50 MG TABS Take 50 mg by mouth every evening.      omeprazole (PRILOSEC) 20 MG CPDR Take 20 mg by mouth 2 times a day. (Patient not taking: Reported on 8/5/2024)       No current facility-administered medications for this visit.          Current supplements as per medication list.     Allergies:   Allergy, Cefepime, Cephalosporins, Clarithromycin, Cleocin [clindamycin], Meropenem, Morphine, and  Sulfamethoxazole-trimethoprim  Social History     Tobacco Use    Smoking status: Former     Current packs/day: 0.00     Average packs/day: 1 pack/day for 6.0 years (6.0 ttl pk-yrs)     Types: Cigarettes     Start date: 1972     Quit date: 1978     Years since quittin.6    Smokeless tobacco: Never    Tobacco comments:        Vaping Use    Vaping status: Never Used   Substance Use Topics    Alcohol use: No     Alcohol/week: 0.0 oz    Drug use: No     Family History   Problem Relation Age of Onset    Heart Disease Father     Arthritis Father     Lung Disease Father         asthma    Psychiatric Illness Father         depression    Alcohol/Drug Father     Depression Father     Hypertension Mother     Cancer Mother     Heart Disease Mother     Arthritis Mother     Stroke Mother     Hyperlipidemia Other     Alcohol/Drug Brother     Depression Daughter     Drug abuse Son         in remission    Bipolar disorder Grandchild     Genetic Disorder Neg Hx     Diabetes Neg Hx      Cyndy  has a past medical history of Allergic rhinitis, Apnea, sleep, Arthritis, Asthma, Back pain, Bowel habit changes, Bronchiectasis (HCC), Bronchitis, Carpal tunnel syndrome, Chest pain, Chest tightness, Cold (2017), Constipation, Cough, Daytime sleepiness, Depression, Difficulty breathing, Difficulty swallowing, DJD (degenerative joint disease), Frequent urination, GERD (gastroesophageal reflux disease), GERD (gastroesophageal reflux disease), Hearing difficulty, Heart burn, Heartburn, Hiatus hernia syndrome, Hyperglycemia (2013), Hypertension, Influenza, Insomnia, Lumps on the skin, Nasal drainage, Obesity, Obstructive sleep apnea, Pain, Painful joint, Palpitations, Pneumonia, Pulmonary hypertension (HCC), Rash, Rhinitis, Shortness of breath, Skin change, Sleep apnea, Snoring, Sore muscles, Sore throat, chronic, Sorethroat (2016), Swelling of lower extremity, Thrush, Tonsillitis, Urinary incontinence, Wears  glasses, and Wheezing.    She has no past medical history of Self-injurious behavior, Substance abuse ([facility]), or Suicide attempt ([facility]).   Past Surgical History:   Procedure Laterality Date    SINUS WASHING Bilateral 4/22/2021    Procedure: IRRIGATION, PARANASAL SINUS - FOR INTRAOP POSTOP SINUS DEBRIDEMENT.;  Surgeon: Yon Rojas M.D.;  Location: SURGERY SAME DAY [facility];  Service: Ent    NC REPAIR OF NASAL SEPTUM  4/15/2021    Procedure: SEPTOPLASTY, NOSE;  Surgeon: Yon Rojas M.D.;  Location: SURGERY SAME DAY [facility];  Service: Ent    ENDOSCOPY, PARANASAL SINUS, WITH TOTAL ETHMOIDECTOMY, SS Bilateral 4/15/2021    Procedure: ENDOSCOPY, PARANASAL SINUS, WITH TOTAL ETHMOIDECTOMY, SPHENOIDOTOMY, MAXILLARY ANTROSTOMY, MAXILLARY SINUS TISS REM, AND EXPLOR FRONT SINUS;  Surgeon: Yon Rojas M.D.;  Location: SURGERY SAME DAY [facility];  Service: Ent    STEREOTACTIC COMPUTER ASSISTED PROCEDURE CRANIAL, EXTRADURAL  4/15/2021    Procedure: STEREOTACTIC COMPUTER ASSISTED PROCEDURE CRANIAL,EXTRADURAL;  Surgeon: Yon Rojas M.D.;  Location: SURGERY SAME DAY [facility];  Service: Ent    SINUS TREPHINE FRONTAL  4/15/2021    Procedure: SINUSOTOMY, FRONTAL SINUS, USING TREPHINE-TYPE DEVICE - ENDOSCOPIC.;  Surgeon: Yon Rojas M.D.;  Location: SURGERY SAME DAY [facility];  Service: Ent    KNEE ARTHROPLASTY TOTAL Right 5/1/2017    Procedure: KNEE ARTHROPLASTY TOTAL;  Surgeon: Jesús Rutledge M.D.;  Location: [facility];  Service:     KNEE ARTHROPLASTY TOTAL Left 3/7/2016    Procedure: KNEE ARTHROPLASTY TOTAL;  Surgeon: Jesús Rutledge M.D.;  Location: [facility];  Service:     FUSION, SPINE, LUMBAR, PLIF  9/13/2013    Performed by Kaushal Ayala M.D. at SURGERY [facility]    LUMBAR LAMINECTOMY DISKECTOMY  9/13/2013    Performed by Kaushal Ayala M.D. at [facility]    LUMBAR LAMINECTOMY DISKECTOMY  5/30/2013    Performed by Kaushal Ayala M.D. at [facility]  ORS    CHOLECYSTECTOMY  2010    laparoscopic    OTHER NEUROLOGICAL SURG  2/2009    I&D of brain from sinus infection    FORAMINOTOMY  11/26/08    Performed by [facility]WORTH at SURGERY [facility] ORS    HARDWARE REMOVAL NEURO  11/26/08    Performed by [facility] at SURGERY [facility] ORS    KNEE ARTHROSCOPY Right 2008    FUSION, SPINE, LUMBAR, PLIF  2005    CARPAL TUNNEL RELEASE Right 2000    SHOULDER ARTHROTOMY Right 2000    ARTHROSCOPY, KNEE      EYE SURGERY Bilateral     Cataract surgery 2/2018, 3/2018    LAMINOTOMY      LA REMV 2ND CATARACT,CORN-SCLER SECTN      TONSILLECTOMY         Screening:  In the last six months have you experienced any leakage of urine? No    Depression Screening  Little interest or pleasure in doing things?  0 - not at all  Feeling down, depressed , or hopeless? 1 - several days  Trouble falling or staying asleep, or sleeping too much?  0 - not at all  Feeling tired or having little energy?  3 - nearly every day  Poor appetite or overeating?  2 - more than half the days  Feeling bad about yourself - or that you are a failure or have let yourself or your family down? 3 - nearly every day  Trouble concentrating on things, such as reading the newspaper or watching television? 2 - more than half the days  Moving or speaking so slowly that other people could have noticed.  Or the opposite - being so fidgety or restless that you have been moving around a lot more than usual?  0 - not at all  Thoughts that you would be better off dead, or of hurting yourself?  2 - more than half the days  Patient Health Questionnaire Score: 13    If depressive symptoms identified deferred to follow up visit unless specifically addressed in assessment and plan.    Interpretation of PHQ-9 Total Score   Score Severity   1-4 No Depression   5-9 Mild Depression   10-14 Moderate Depression   15-19 Moderately Severe Depression   20-27 Severe Depression    Screening for Cognitive Impairment  Do you or any of  your friends or family members have any concern about your memory? No  Three Minute Recall (Leader, Season, Table) 3/3    Colt clock face with all 12 numbers and set the hands to show 10 minutes after 11.  Yes 5/5  Cognitive concerns identified deferred for follow up unless specifically addressed in assessment and plan.    Fall Risk Assessment  Has the patient had two or more falls in the last year or any fall with injury in the last year?  No    Safety Assessment  Do you always wear your seatbelt?  Yes  Any changes to home needed to function safely? No  Difficulty hearing.  Yes  Patient counseled about all safety risks that were identified.    Functional Assessment ADLs  Are there any barriers preventing you from cooking for yourself or meeting nutritional needs?  Yes.  helps  Are there any barriers preventing you from driving safely or obtaining transportation?  No.    Are there any barriers preventing you from using a telephone or calling for help?  No    Are there any barriers preventing you from shopping?  Yes.  helps  Are there any barriers preventing you from taking care of your own finances?  No    Are there any barriers preventing you from managing your medications?  No    Are there any barriers preventing you from showering, bathing or dressing yourself? No    Are there any barriers preventing you from doing housework or laundry? Yes  helps  Are there any barriers preventing you from using the toilet?No    Are you currently engaging in any exercise or physical activity?  Yes.      Self-Assessment of Health  What is your perception of your health? Fair    Do you sleep more than six hours a night? Yes    In the past 7 days, how much did pain keep you from doing your normal work? A lot Back and weight  Do you spend quality time with family or friends (virtually or in person)? Yes    Do you usually eat a heart healthy diet that constists of a variety of fruits, vegetables, whole grains and  fiber? No    Do you eat foods high in fat and/or Fast Food more than three times per week? No    How concerned are you that your medical conditions are not being well managed? very    Are you worried that in the next 2 months, you may not have stable housing that you own, rent, or stay in as part of a household? No        Advance Care Planning  Do you have an Advance Directive, Living Will, Durable Power of , or POLST? No                 Health Maintenance Summary            Overdue - Hepatitis C Screening (Once) Never done      No completion history exists for this topic.              Overdue - Annual Pulmonary Function Test / Spirometry (Yearly) Overdue since 7/6/2021 07/06/2020  PFT DICTATED RESULTS              Overdue - COVID-19 Vaccine (5 - 2023-24 season) Overdue since 9/1/2023 01/04/2023  Imm Admin: PFIZER BIVALENT SARS-COV-2 VACCINE (12+)    10/02/2021  Imm Admin: PFIZER PURPLE CAP SARS-COV-2 VACCINATION (12+)    03/06/2021  Imm Admin: PFIZER PURPLE CAP SARS-COV-2 VACCINATION (12+)    02/16/2021  Imm Admin: PFIZER PURPLE CAP SARS-COV-2 VACCINATION (12+)              Influenza Vaccine (1) Next due on 9/1/2024 09/19/2023  Outside Immunization: Fluzone High-Dose Quad    09/01/2023  Outside Immunization: Influenza, seasonal, injectable    09/07/2022  Patient Reported Immunization: Influenza, Unspecified - HISTORICAL DATA    09/18/2020  Imm Admin: Influenza Vaccine Adult HD    09/05/2019  Imm Admin: Influenza Vaccine Adult HD    Only the first 5 history entries have been loaded, but more history exists.              Annual Wellness Visit (Yearly) Next due on 8/5/2025 08/05/2024  Level of Service: AL ANNUAL WELLNESS VISIT-INCLUDES PPPS SUBSEQUE*    04/04/2023  Level of Service: AL ANNUAL WELLNESS VISIT-INCLUDES PPPS SUBSEQUE*    02/22/2022  Level of Service: AL ANNUAL WELLNESS VISIT-INCLUDES PPPS SUBSEQUE*              Bone Density Scan (Every 5 Years) Tentatively due on 8/17/2026       08/17/2021  DS-BONE DENSITY STUDY (DEXA)    03/15/2017  DS-BONE DENSITY STUDY (DEXA)    08/18/2014  DS-BONE DENSITY STUDY (DEXA)    08/26/2010  DS-BONE DENSITY STUDY (DEXA)    01/19/2007  DS-BONE DENSITY STUDY (DEXA)    Only the first 5 history entries have been loaded, but more history exists.              IMM DTaP/Tdap/Td Vaccine (3 - Td or Tdap) Next due on 2/27/2030 02/27/2020  Imm Admin: Tdap Vaccine    10/21/2014  Imm Admin: Tdap Vaccine              Zoster (Shingles) Vaccines (Series Information) Completed      11/18/2020  Imm Admin: Zoster Vaccine Recombinant (RZV) (SHINGRIX)    09/18/2020  Imm Admin: Zoster Vaccine Recombinant (RZV) (SHINGRIX)    03/18/2014  Imm Admin: Zoster Vaccine Live (ZVL) (Zostavax) - HISTORICAL DATA              Pneumococcal Vaccine: 65+ Years (Series Information) Completed      06/22/2023  Outside Immunization: PCV20    10/26/2015  Imm Admin: Pneumococcal Conjugate Vaccine (Prevnar/PCV-13)    10/01/2015  Imm Admin: Pneumococcal Conjugate Vaccine (Prevnar/PCV-13)    10/21/2014  Imm Admin: Pneumococcal polysaccharide vaccine (PPSV-23)    01/01/2009  Imm Admin: Pneumococcal polysaccharide vaccine (PPSV-23)    Only the first 5 history entries have been loaded, but more history exists.              Hepatitis A Vaccine (Hep A) (Series Information) Aged Out      No completion history exists for this topic.              Hepatitis B Vaccine (Hep B) (Series Information) Aged Out      No completion history exists for this topic.              HPV Vaccines (Series Information) Aged Out      No completion history exists for this topic.              Polio Vaccine (Inactivated Polio) (Series Information) Aged Out      No completion history exists for this topic.              Meningococcal Immunization (Series Information) Aged Out      No completion history exists for this topic.              Discontinued - Mammogram  Discontinued        Frequency changed to Never automatically (Topic No  "Longer Applies)    09/14/2022  MA-SCREENING MAMMO BILAT W/TOMOSYNTHESIS W/CAD    08/17/2021  MA-SCREENING MAMMO BILAT W/TOMOSYNTHESIS W/CAD    06/12/2019  MA-SCREENING MAMMO BILAT W/TOMOSYNTHESIS W/CAD    03/16/2018  MA-MAMMO SCREENING BILAT W/BJ W/CAD    Only the first 5 history entries have been loaded, but more history exists.                    Patient Care Team:  Sultana Browne M.D. as PCP - General  Sultana Browne M.D. as PCP - HTH Paneled  PREFERRED (DME Supplier)    Financial Resource Strain: Not on file      Transportation Needs: Not on file      Food Insecurity: Not on file        Encounter Vitals  Blood Pressure : 126/78  O2 Delivery Device: None - Room Air, CPAP  Weight: (!) 127 kg (280 lb)  Height: 154.9 cm (5' 1\")  BMI (Calculated): 52.91  Pain Score: No pain  DME  O2 Delivery Device: None - Room Air, CPAP     Alert, oriented in no acute distress.  Eye contact is good, speech goal directed, affect calm.    Assessment and Plan. The following treatment and monitoring plan is recommended:  BMI 50.0-59.9, adult (HCC)  Chronic, stable. The patient is working on her diet. She walks daily. Discussed eating a diet that contains many vegetables, fruits, and whole grains; includes low-fat dairy products, poultry, fish, beans, non-tropical vegetable oils and nuts; and limit intake of sweets, sugar-sweetened drinks and red meats.   Follow-up at least annually.      Mild episode of recurrent major depressive disorder (HCC)  Chronic, stable.  PHQ-9 score today is 13.  She denies SI/HI. Not currently in counseling.   Continue with current defined treatment plan: duloxetine (CYMBALTA) 60 MG Cap DR Particles delayed-release capsule. Follow-up at least annually.      Age-related osteoporosis without current pathological fracture  Chronic, stable. The patient denies any recent falls of fractures. Discussed adding weight bearing exercise such as walking for 30 minutes most of the days of the week.  Also " discussed adequate calcium and vitamin D ingestion and optimal diet.  Follow up at least annually.       GERD (gastroesophageal reflux disease)  Chronic, stable. The patient reports that her symptoms are well controlled with current medication. Continue with current defined treatment plan: famotidine (PEPCID) 40 MG Tab. Follow-up at least annually.      HTN (hypertension)  Chronic, stable. The patient's blood pressure is well controlled with current medication. Continue with current defined treatment plan: losartan (COZAAR) 100 MG Tab, amLODIPine (NORVASC) 10 MG Tab. Follow-up at least annually.      Mucopurulent chronic bronchitis (HCC)  Chronic, stable. Continue with current defined treatment plan: albuterol 108 (90 Base) MCG/ACT Aero Soln inhalation aerosol, fluticasone-salmeterol (ADVAIR) 500-50 MCG/DOSE AEROSOL POWDER, BREATH ACTIVATED. Follow-up at least annually.      Chronic kidney disease (CKD) stage G3a/A1, moderately decreased glomerular filtration rate (GFR) between 45-59 mL/min/1.73 square meter and albuminuria creatinine ratio less than 30 mg/g (HCC)  Chronic, stable. The patient denies any symptoms at this time. No current treatment. Discussed drinking plenty of water and avoiding nephrotoxins such as NSAIDs.  Follow-up at least annually.          Morbid obesity with BMI of 50.0-59.9, adult (HCC)  Chronic, stable. The patient is working on her diet. She walks daily. Discussed eating a diet that contains many vegetables, fruits, and whole grains; includes low-fat dairy products, poultry, fish, beans, non-tropical vegetable oils and nuts; and limit intake of sweets, sugar-sweetened drinks and red meats.   Follow-up at least annually.    Exudative age-related macular degeneration of right eye (HCC)  Chronic, stable. The patient is being followed by ophthalmology. She gets eye shots every 10 weeks.  Follow-up at least annually.      Services suggested: No services needed at this time  Health Care  Screening: Age-appropriate preventive services recommended by USPTF and ACIP covered by Medicare were discussed today. Services ordered if indicated and agreed upon by the patient.  Referrals offered: Community-based lifestyle interventions to reduce health risks and promote self-management and wellness, fall prevention, nutrition, physical activity, tobacco-use cessation, weight loss, and mental health services as per orders if indicated.    Discussion today about general wellness and lifestyle habits:    Prevent falls and reduce trip hazards; Cautioned about securing or removing rugs.  Have a working fire alarm and carbon monoxide detector.  Engage in regular physical activity and social activities.    Follow-up: Return for appointment with Primary Care Provider as needed.

## 2024-08-05 NOTE — ASSESSMENT & PLAN NOTE
Chronic, stable.  PHQ-9 score today is 13.  She denies SI/HI. Not currently in counseling.   Continue with current defined treatment plan: duloxetine (CYMBALTA) 60 MG Cap DR Particles delayed-release capsule. Follow-up at least annually.

## 2024-08-05 NOTE — ASSESSMENT & PLAN NOTE
Chronic, stable. The patient denies any symptoms at this time. No current treatment. Discussed drinking plenty of water and avoiding nephrotoxins such as NSAIDs.  Follow-up at least annually.

## 2024-08-05 NOTE — ASSESSMENT & PLAN NOTE
Chronic, stable. The patient reports that her symptoms are well controlled with current medication. Continue with current defined treatment plan: famotidine (PEPCID) 40 MG Tab. Follow-up at least annually.

## 2024-08-05 NOTE — ASSESSMENT & PLAN NOTE
Chronic, stable. The patient's blood pressure is well controlled with current medication. Continue with current defined treatment plan: losartan (COZAAR) 100 MG Tab, amLODIPine (NORVASC) 10 MG Tab. Follow-up at least annually.

## 2024-08-05 NOTE — ASSESSMENT & PLAN NOTE
Chronic, stable. The patient is being followed by ophthalmology. She gets eye shots every 10 weeks.  Follow-up at least annually.

## 2024-08-05 NOTE — ASSESSMENT & PLAN NOTE
Chronic, stable. Continue with current defined treatment plan: albuterol 108 (90 Base) MCG/ACT Aero Soln inhalation aerosol, fluticasone-salmeterol (ADVAIR) 500-50 MCG/DOSE AEROSOL POWDER, BREATH ACTIVATED. Follow-up at least annually.

## 2024-08-05 NOTE — ASSESSMENT & PLAN NOTE
Chronic, stable. The patient is working on her diet. She walks daily. Discussed eating a diet that contains many vegetables, fruits, and whole grains; includes low-fat dairy products, poultry, fish, beans, non-tropical vegetable oils and nuts; and limit intake of sweets, sugar-sweetened drinks and red meats.   Follow-up at least annually.

## 2024-08-09 NOTE — FLOWSHEET NOTE
05/02/17 0708   Events/Summary/Plan   Events/Summary/Plan Patient already of CPAP unit,  saturations 95% on 2 lpm NC.   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

## 2024-09-04 ENCOUNTER — OFFICE VISIT (OUTPATIENT)
Dept: SLEEP MEDICINE | Facility: MEDICAL CENTER | Age: 79
End: 2024-09-04
Attending: PHYSICIAN ASSISTANT
Payer: MEDICARE

## 2024-09-04 VITALS
SYSTOLIC BLOOD PRESSURE: 118 MMHG | BODY MASS INDEX: 52.87 KG/M2 | WEIGHT: 280 LBS | RESPIRATION RATE: 16 BRPM | HEART RATE: 81 BPM | DIASTOLIC BLOOD PRESSURE: 72 MMHG | OXYGEN SATURATION: 94 % | HEIGHT: 61 IN

## 2024-09-04 DIAGNOSIS — G47.33 OSA ON CPAP: ICD-10-CM

## 2024-09-04 PROCEDURE — 99213 OFFICE O/P EST LOW 20 MIN: CPT | Performed by: PHYSICIAN ASSISTANT

## 2024-09-04 PROCEDURE — 3074F SYST BP LT 130 MM HG: CPT | Performed by: PHYSICIAN ASSISTANT

## 2024-09-04 PROCEDURE — 3078F DIAST BP <80 MM HG: CPT | Performed by: PHYSICIAN ASSISTANT

## 2024-09-04 ASSESSMENT — ENCOUNTER SYMPTOMS
SPUTUM PRODUCTION: 1
COUGH: 1
WHEEZING: 1
PALPITATIONS: 0
FEVER: 0
HEARTBURN: 1
SHORTNESS OF BREATH: 0
HEADACHES: 0
SORE THROAT: 0
SINUS PAIN: 1
TREMORS: 1
WEIGHT LOSS: 0
INSOMNIA: 1
DIZZINESS: 0
ORTHOPNEA: 0
CHILLS: 0

## 2024-09-04 ASSESSMENT — FIBROSIS 4 INDEX: FIB4 SCORE: 0.99

## 2024-09-04 NOTE — PATIENT INSTRUCTIONS
"1-reviewed compliance which is excellent  2-demonstrating use and benefit  3-some issues with poor humidity and patient tolerance  4-device was set to slim line rather than standard tubing and patient with poorly fitted mask  5-reviewed head gear fit \"fingertip tight\"   6-needs to trial medium mask with small head gear  7-updated orders sent  8-patient to see if this addresses issue  9-short term follow up to review  10-As a reminder use distilled water only in humidifier chamber.    11-Today we reviewed equipment cleaning  once weekly minimum  mask, tubing and water chamber  use dedicated container  use mild soap and water  SoClean or other ozone  are not recommended  white vinegar and water solution is no longer recommended  hang tubing to dry  mask sanitizing wipes are an option for use   12-Equipment replacement schedule : Mask every 6 months, Head gear every 6 months, Tubing every 3 months, Ultra-fine filters 2 times per month, Humidifier chamber every 6 months       "

## 2024-09-04 NOTE — PROGRESS NOTES
"Chief Complaint   Patient presents with    Apnea     Last Office Visit 5/22/23 with JACK Martinez    DME:  [facility]       HPI:  Cyndy Petit is a 78 y.o. year old female here today for follow-up on obstructive sleep apnea.  Patient last seen in clinic 5/22/2023 with RODO Funk.  Previously evaluated by Dr. Jesús Hollingsworth 5/11/2022.  Patient is followed by pulmonary clinic for her asthma.    Past Medical History: GERD, hypertension, back pain S/P laminectomy, moderate persistent asthma, BEATRIZ on CPAP, seasonal allergies, allergic rhinitis followed by ENT, chronic insomnia, morbid obesity, mucopurulent chronic bronchitis, chronic kidney disease stage G3a/A1, depression, pulmonary hypertension, joint pain.    Vitals:  /72 (BP Location: Left arm, Patient Position: Sitting, BP Cuff Size: Adult)   Pulse 81   Resp 16   Ht 1.549 m (5' 1\")   Wt (!) 127 kg (280 lb)   SpO2 94% BMI of 52.91 kg/m².    Recent Imaging: None    Echocardiogram obtained 11/26/2018 demonstrated normal left ventricular chamber size, wall thickness, systolic and diastolic function.  LVEF estimated at 65%.  Normal right ventricular size and systolic function.  Trace mitral regurgitation.    Currently using  Resmed auto CPAP @15-18 cm H20 pressure; compliance reviewed for 8/4/2024 through 9/2/2024, days used 28/30, average daily usage 7 hours 5 minutes, 93% of days greater than or equal to 4 hours, mask leak at 56.7 LPM at 95th percentile, AHI 1.3 per hour.  Severe mask leak noted see media for full report.    Device obtained 8/18/2021  [facility] provider [facility]  Mask interface fullface mask    Initial polysomnogram demonstrated findings consistent with mild obstructive sleep apnea with overall AHI of 12.4 and low O2 sat of 84%.  She was titrated to CPAP of 16 but felt this was excessive and subsequently pressure was decreased to 14 for tolerance.    Polysomnogram titration study obtained 3/30/2017 " demonstrating low O2 sat of 81% with sats less than or equal to 89% for 2.8 minutes of recorded sleep time.  Successful CPAP titration to 14 cm H2O pressure with fullface mask.  Rare PVCs were noted.    Sleep schedule goes to bed 8 PM, wakens 6 AM , and gets up during the night 3-4 times   Symptoms occasional day time somnolence, denies morning headache, fatigue, and does complain of a dry throat.  Does occasionally nap for approximately 30 minutes during the day. Patient does takes Trazodone sleep aid.      Review of Systems   Constitutional:  Positive for malaise/fatigue (decreased energy). Negative for chills, fever and weight loss.   HENT:  Positive for congestion, hearing loss, sinus pain (uses budesonide per ENT) and tinnitus (off/on). Negative for nosebleeds and sore throat.    Eyes:         Presc eyeglasses    Respiratory:  Positive for cough, sputum production (clear, takes guaifenesin) and wheezing (asthma). Negative for shortness of breath.    Cardiovascular:  Positive for chest pain (may have been muscle pain) and leg swelling (mild). Negative for palpitations and orthopnea.   Gastrointestinal:  Positive for heartburn (on meds with break thru).        No dentures, missing 2 teeth, some swallowing    Neurological:  Positive for tremors (occasional). Negative for dizziness and headaches.   Psychiatric/Behavioral:  The patient has insomnia (takes medication).        Past Medical History:   Diagnosis Date    Allergic rhinitis     Apnea, sleep     Arthritis     Asthma     inhalers    Back pain     Bowel habit changes     constipation    Bronchiectasis (HCC)     Bronchitis     Carpal tunnel syndrome     Chest pain     Chest tightness     Cold 4-8-2017    cold; taking antibiotics  4-    Constipation     Cough     Daytime sleepiness     Depression     Difficulty breathing     Difficulty swallowing     DJD (degenerative joint disease)     Frequent urination     GERD (gastroesophageal reflux disease)      "GERD (gastroesophageal reflux disease)     Hearing difficulty     Heart burn     Heartburn     Hiatus hernia syndrome     Hyperglycemia 5/31/2013    Hypertension     Influenza     Insomnia     Lumps on the skin     Nasal drainage     Obesity     Obstructive sleep apnea     Pain     back, knees    Painful joint     Palpitations     Pneumonia     Pulmonary hypertension (HCC)     Rash     Rhinitis     Shortness of breath     Skin change     Sleep apnea     CPAP    Snoring     Sore muscles     Sore throat, chronic     Sorethroat 2/09/2016    Swelling of lower extremity     Thrush     from \"Advair\", takes nystatin    Tonsillitis     Urinary incontinence     Wears glasses     Wheezing        Past Surgical History:   Procedure Laterality Date    SINUS WASHING Bilateral 4/22/2021    Procedure: IRRIGATION, PARANASAL SINUS - FOR INTRAOP POSTOP SINUS DEBRIDEMENT.;  Surgeon: Yon Rojas M.D.;  Location: SURGERY SAME DAY [facility];  Service: Ent    WI REPAIR OF NASAL SEPTUM  4/15/2021    Procedure: SEPTOPLASTY, NOSE;  Surgeon: Yon Rojas M.D.;  Location: SURGERY SAME DAY [facility];  Service: Ent    ENDOSCOPY, PARANASAL SINUS, WITH TOTAL ETHMOIDECTOMY, SS Bilateral 4/15/2021    Procedure: ENDOSCOPY, PARANASAL SINUS, WITH TOTAL ETHMOIDECTOMY, SPHENOIDOTOMY, MAXILLARY ANTROSTOMY, MAXILLARY SINUS TISS REM, AND EXPLOR FRONT SINUS;  Surgeon: Yon Rojas M.D.;  Location: SURGERY SAME DAY [facility];  Service: Ent    STEREOTACTIC COMPUTER ASSISTED PROCEDURE CRANIAL, EXTRADURAL  4/15/2021    Procedure: STEREOTACTIC COMPUTER ASSISTED PROCEDURE CRANIAL,EXTRADURAL;  Surgeon: Yon Rojas M.D.;  Location: SURGERY SAME DAY [facility];  Service: Ent    SINUS TREPHINE FRONTAL  4/15/2021    Procedure: SINUSOTOMY, FRONTAL SINUS, USING TREPHINE-TYPE DEVICE - ENDOSCOPIC.;  Surgeon: Yon Rojas M.D.;  Location: SURGERY SAME DAY [facility];  Service: Ent    KNEE ARTHROPLASTY TOTAL Right 5/1/2017    Procedure: KNEE ARTHROPLASTY TOTAL;  " Surgeon: Jesús Rutledge M.D.;  Location: SURGERY [facility];  Service:     KNEE ARTHROPLASTY TOTAL Left 3/7/2016    Procedure: KNEE ARTHROPLASTY TOTAL;  Surgeon: Jesús Rutledge M.D.;  Location: SURGERY [facility];  Service:     FUSION, SPINE, LUMBAR, PLIF  9/13/2013    Performed by Kaushal Ayala M.D. at SURGERY [facility]    LUMBAR LAMINECTOMY DISKECTOMY  9/13/2013    Performed by Kaushal Ayala M.D. at SURGERY [facility]    LUMBAR LAMINECTOMY DISKECTOMY  5/30/2013    Performed by Kaushal Ayala M.D. at SURGERY [facility]    CHOLECYSTECTOMY  2010    laparoscopic    OTHER NEUROLOGICAL SURG  2/2009    I&D of brain from sinus infection    FORAMINOTOMY  11/26/08    Performed by [facility] at SURGERY [facility]    HARDWARE REMOVAL NEURO  11/26/08    Performed by [facility] at SURGERY [facility]    KNEE ARTHROSCOPY Right 2008    FUSION, SPINE, LUMBAR, PLIF  2005    CARPAL TUNNEL RELEASE Right 2000    SHOULDER ARTHROTOMY Right 2000    ARTHROSCOPY, KNEE      EYE SURGERY Bilateral     Cataract surgery 2/2018, 3/2018    LAMINOTOMY      PA REMV 2ND CATARACT,CORN-SCLER SECTN      TONSILLECTOMY         Family History   Problem Relation Age of Onset    Heart Disease Father     Arthritis Father     Lung Disease Father         asthma    Psychiatric Illness Father         depression    Alcohol/Drug Father     Depression Father     Hypertension Mother     Cancer Mother     Heart Disease Mother     Arthritis Mother     Stroke Mother     Hyperlipidemia Other     Alcohol/Drug Brother     Depression Daughter     Drug abuse Son         in remission    Bipolar disorder Grandchild     Genetic Disorder Neg Hx     Diabetes Neg Hx        Social History     Socioeconomic History    Marital status:      Spouse name: Not on file    Number of children: Not on file    Years of education: Not on file    Highest education level: Not on file   Occupational History    Not on file   Tobacco  Use    Smoking status: Former     Current packs/day: 0.00     Average packs/day: 1 pack/day for 6.0 years (6.0 ttl pk-yrs)     Types: Cigarettes     Start date: 1972     Quit date: 1978     Years since quittin.7    Smokeless tobacco: Never    Tobacco comments:        Vaping Use    Vaping status: Never Used   Substance and Sexual Activity    Alcohol use: No     Alcohol/week: 0.0 oz    Drug use: No    Sexual activity: Not on file   Other Topics Concern    Not on file   Social History Narrative    ** Merged History Encounter **          Social Determinants of Health     Financial Resource Strain: Not on file   Food Insecurity: Not on file   Transportation Needs: Not on file   Physical Activity: Not on file   Stress: Not on file   Social Connections: Not on file   Intimate Partner Violence: Not on file   Housing Stability: Not on file       Allergies as of 2024 - Reviewed 2024   Allergen Reaction Noted    Allergy  2013    Cefepime Hives 2010    Cephalosporins Hives 2010    Clarithromycin Hives 2010    Cleocin [clindamycin] Itching 2016    Meropenem Hives 2013    Morphine Itching 2013    Sulfamethoxazole-trimethoprim Itching 2013          Current medications as of today   Current Outpatient Medications   Medication Sig Dispense Refill    famotidine (PEPCID) 40 MG Tab Take 40 mg by mouth every day.      fexofenadine (ALLEGRA) 180 MG tablet Take 180 mg by mouth every day.      Docusate Calcium (STOOL SOFTENER PO) Take  by mouth 2 Times a Day.      Multiple Vitamins-Minerals (PRESERVISION AREDS 2 PO) Take  by mouth 2 Times a Day.      benzonatate (TESSALON) 100 MG Cap Take 100 mg by mouth 3 times a day as needed for Cough.      amLODIPine (NORVASC) 10 MG Tab TAKE 1 TABLET BY MOUTH ONCE DAILY. REPLACES 5 MG  2    ipratropium-albuterol (DUONEB) 0.5-2.5 (3) MG/3ML nebulizer solution 3 mL by Nebulization route 4 times a day.      fluticasone-salmeterol  (ADVAIR) 500-50 MCG/DOSE AEROSOL POWDER, BREATH ACTIVATED Inhale 1 Puff by mouth every 12 hours.      multivitamin (THERAGRAN) Tab Take 1 Tab by mouth every day.      Multiple Vitamins-Minerals (HAIR SKIN AND NAILS FORMULA) Tab Take 1 Tab by mouth every day.      duloxetine (CYMBALTA) 60 MG Cap DR Particles delayed-release capsule Take 60 mg by mouth every day.      albuterol 108 (90 Base) MCG/ACT Aero Soln inhalation aerosol Inhale 2 Puffs by mouth every 6 hours as needed for Shortness of Breath. Shortness of breath      losartan (COZAAR) 100 MG TABS Take 100 mg by mouth every day.      nystatin (MYCOSTATIN) 430277 UNIT/ML SUSP Take 100,000 Units by mouth 5 Times a Day.      montelukast (SINGULAIR) 10 MG TABS Take 10 mg by mouth every morning.      trazodone (DESYREL) 50 MG TABS Take 50 mg by mouth every evening.      Azelastine HCl 137 MCG/SPRAY Solution USE 2 SPRAY(S) IN EACH NOSTRIL TWICE DAILY (Patient not taking: No sig reported)      Amoxicillin 500 MG Tab Take  by mouth. Prior to dental (Patient not taking: Reported on 10/12/2021)      gabapentin (NEURONTIN) 300 MG Cap Take 300 mg by mouth 4 times a day. (Patient not taking: Reported on 8/5/2024)      omeprazole (PRILOSEC) 20 MG CPDR Take 20 mg by mouth 2 times a day. (Patient not taking: Reported on 8/5/2024)       No current facility-administered medications for this visit.         Physical Exam:   Gen:           Alert and oriented, No apparent distress. Mood and affect appropriate, normal interaction with examiner.   Hearing:     Grossly intact.  Nose:          Normal, no lesions or deformities.  Dentition:    fair dentition.   Oropharynx:   Tongue normal, posterior pharynx without erythema or exudate.  Mallampati Classification: IV  Neck:        Supple, trachea midline, no masses.  Respiratory Effort: No intercostal retractions or use of accessory muscles.   Gait and Station: Normal.  Digits and Nails: No clubbing, cyanosis, petechiae, or nodes.   Skin:  "       No rashes, lesions or ulcers noted.               Ext:           No cyanosis or edema.      Immunizations:  Flu:9/1/2023  Pneumovax 23: 10/21/2014  Prevnar 13: 10/26/2015  PCV 20: 6/22/2023  Pfizer booster SARS-CoV-2 vaccine: 1/4/2023  Pfizer SARS CoV2 Vaccine: 10/2/2021, 3/6/2021, 2/16/2021    Assessment / Plan:  1. BEATRIZ on CPAP  - DME Mask and Supplies    Reviewed compliance which is excellent, demonstrating use and benefit.  Patient has expressed some issues with poor humidification and tolerance.  Device was set to SlimLine rather than standard tubing in addition to patient having poorly fitting mask.  Reviewed headgear fit \"fingertip tight\".  Patient needs to trial medium mask with small headgear, updated orders to be sent to DME.  Patient to assess whether this resolves the issue, additionally device should be situated 6 to 12 inches below mattress height.  Reminded to use distilled water only in the humidifier chamber, reviewed equipment cleaning as well as equipment replacement schedule.  Short-term follow-up to reassess resolution of these issues.    Follow-up:   Return in about 3 months (around 12/4/2024) for Return with Nataliya Olosn PA-C.    Please note that this dictation was created using voice recognition software. I have made every reasonable attempt to correct obvious errors, but it is possible there are errors of grammar and possibly content that I did not discover before finalizing the note.    "

## 2024-12-07 ENCOUNTER — HOSPITAL ENCOUNTER (OUTPATIENT)
Dept: RADIOLOGY | Facility: MEDICAL CENTER | Age: 79
End: 2024-12-07
Attending: STUDENT IN AN ORGANIZED HEALTH CARE EDUCATION/TRAINING PROGRAM
Payer: MEDICARE

## 2024-12-07 DIAGNOSIS — R05.1 ACUTE COUGH: ICD-10-CM

## 2024-12-07 PROCEDURE — 71046 X-RAY EXAM CHEST 2 VIEWS: CPT

## 2024-12-09 ENCOUNTER — HOSPITAL ENCOUNTER (OUTPATIENT)
Dept: LAB | Facility: MEDICAL CENTER | Age: 79
End: 2024-12-09
Attending: STUDENT IN AN ORGANIZED HEALTH CARE EDUCATION/TRAINING PROGRAM
Payer: MEDICARE

## 2024-12-09 LAB
ALBUMIN SERPL BCP-MCNC: 3.7 G/DL (ref 3.2–4.9)
ALBUMIN/GLOB SERPL: 1.3 G/DL
ALP SERPL-CCNC: 68 U/L (ref 30–99)
ALT SERPL-CCNC: 16 U/L (ref 2–50)
ANION GAP SERPL CALC-SCNC: 8 MMOL/L (ref 7–16)
AST SERPL-CCNC: 22 U/L (ref 12–45)
BASOPHILS # BLD AUTO: 0.8 % (ref 0–1.8)
BASOPHILS # BLD: 0.06 K/UL (ref 0–0.12)
BILIRUB SERPL-MCNC: 0.4 MG/DL (ref 0.1–1.5)
BUN SERPL-MCNC: 16 MG/DL (ref 8–22)
CALCIUM ALBUM COR SERPL-MCNC: 9 MG/DL (ref 8.5–10.5)
CALCIUM SERPL-MCNC: 8.8 MG/DL (ref 8.5–10.5)
CHLORIDE SERPL-SCNC: 106 MMOL/L (ref 96–112)
CHOLEST SERPL-MCNC: 155 MG/DL (ref 100–199)
CO2 SERPL-SCNC: 27 MMOL/L (ref 20–33)
CREAT SERPL-MCNC: 1 MG/DL (ref 0.5–1.4)
EOSINOPHIL # BLD AUTO: 0.33 K/UL (ref 0–0.51)
EOSINOPHIL NFR BLD: 4.4 % (ref 0–6.9)
ERYTHROCYTE [DISTWIDTH] IN BLOOD BY AUTOMATED COUNT: 50.4 FL (ref 35.9–50)
EST. AVERAGE GLUCOSE BLD GHB EST-MCNC: 137 MG/DL
GFR SERPLBLD CREATININE-BSD FMLA CKD-EPI: 57 ML/MIN/1.73 M 2
GLOBULIN SER CALC-MCNC: 2.9 G/DL (ref 1.9–3.5)
GLUCOSE SERPL-MCNC: 106 MG/DL (ref 65–99)
HBA1C MFR BLD: 6.4 % (ref 4–5.6)
HCT VFR BLD AUTO: 42.5 % (ref 37–47)
HDLC SERPL-MCNC: 52 MG/DL
HGB BLD-MCNC: 13.9 G/DL (ref 12–16)
IMM GRANULOCYTES # BLD AUTO: 0.04 K/UL (ref 0–0.11)
IMM GRANULOCYTES NFR BLD AUTO: 0.5 % (ref 0–0.9)
LDLC SERPL CALC-MCNC: 88 MG/DL
LYMPHOCYTES # BLD AUTO: 1.58 K/UL (ref 1–4.8)
LYMPHOCYTES NFR BLD: 21.2 % (ref 22–41)
MCH RBC QN AUTO: 30.4 PG (ref 27–33)
MCHC RBC AUTO-ENTMCNC: 32.7 G/DL (ref 32.2–35.5)
MCV RBC AUTO: 93 FL (ref 81.4–97.8)
MONOCYTES # BLD AUTO: 0.8 K/UL (ref 0–0.85)
MONOCYTES NFR BLD AUTO: 10.7 % (ref 0–13.4)
NEUTROPHILS # BLD AUTO: 4.64 K/UL (ref 1.82–7.42)
NEUTROPHILS NFR BLD: 62.4 % (ref 44–72)
NRBC # BLD AUTO: 0 K/UL
NRBC BLD-RTO: 0 /100 WBC (ref 0–0.2)
PLATELET # BLD AUTO: 244 K/UL (ref 164–446)
PMV BLD AUTO: 10.1 FL (ref 9–12.9)
POTASSIUM SERPL-SCNC: 4.1 MMOL/L (ref 3.6–5.5)
PROT SERPL-MCNC: 6.6 G/DL (ref 6–8.2)
RBC # BLD AUTO: 4.57 M/UL (ref 4.2–5.4)
SODIUM SERPL-SCNC: 141 MMOL/L (ref 135–145)
TRIGL SERPL-MCNC: 73 MG/DL (ref 0–149)
TSH SERPL-ACNC: 1.62 UIU/ML (ref 0.35–5.5)
WBC # BLD AUTO: 7.5 K/UL (ref 4.8–10.8)

## 2024-12-09 PROCEDURE — 80053 COMPREHEN METABOLIC PANEL: CPT

## 2024-12-09 PROCEDURE — 84443 ASSAY THYROID STIM HORMONE: CPT

## 2024-12-09 PROCEDURE — 36415 COLL VENOUS BLD VENIPUNCTURE: CPT

## 2024-12-09 PROCEDURE — 80061 LIPID PANEL: CPT

## 2024-12-09 PROCEDURE — 83036 HEMOGLOBIN GLYCOSYLATED A1C: CPT

## 2024-12-09 PROCEDURE — 85025 COMPLETE CBC W/AUTO DIFF WBC: CPT

## 2024-12-18 ENCOUNTER — TELEPHONE (OUTPATIENT)
Dept: SLEEP MEDICINE | Facility: MEDICAL CENTER | Age: 79
End: 2024-12-18
Payer: MEDICARE

## 2024-12-19 ENCOUNTER — OFFICE VISIT (OUTPATIENT)
Dept: SLEEP MEDICINE | Facility: MEDICAL CENTER | Age: 79
End: 2024-12-19
Attending: PHYSICIAN ASSISTANT
Payer: MEDICARE

## 2024-12-19 VITALS
HEIGHT: 62 IN | SYSTOLIC BLOOD PRESSURE: 128 MMHG | WEIGHT: 293 LBS | RESPIRATION RATE: 14 BRPM | OXYGEN SATURATION: 97 % | DIASTOLIC BLOOD PRESSURE: 74 MMHG | HEART RATE: 77 BPM | BODY MASS INDEX: 53.92 KG/M2

## 2024-12-19 DIAGNOSIS — G47.33 OSA ON CPAP: ICD-10-CM

## 2024-12-19 PROCEDURE — 99213 OFFICE O/P EST LOW 20 MIN: CPT | Performed by: PHYSICIAN ASSISTANT

## 2024-12-19 PROCEDURE — 3074F SYST BP LT 130 MM HG: CPT | Performed by: PHYSICIAN ASSISTANT

## 2024-12-19 PROCEDURE — 3078F DIAST BP <80 MM HG: CPT | Performed by: PHYSICIAN ASSISTANT

## 2024-12-19 ASSESSMENT — FIBROSIS 4 INDEX: FIB4 SCORE: 1.78

## 2024-12-19 ASSESSMENT — ENCOUNTER SYMPTOMS
CHILLS: 0
ORTHOPNEA: 0
SINUS PAIN: 0
WHEEZING: 1
PALPITATIONS: 0
DIZZINESS: 1
HEARTBURN: 1
FEVER: 0
TREMORS: 1
SORE THROAT: 0
SHORTNESS OF BREATH: 0
SPUTUM PRODUCTION: 0
HEADACHES: 1
COUGH: 0
INSOMNIA: 0
WEIGHT LOSS: 0

## 2024-12-19 NOTE — PROGRESS NOTES
"Chief Complaint   Patient presents with    Apnea     Last Office Visit 9/4/24 with Nataliya Olson P.A.-C.    Settings:AutoCPAP 15-18    Device obtained 8/18/2021       HPI:  Cyndy Petit is a 79 y.o. year old female here today for follow-up on obstructive sleep apnea.  Last seen in clinic on 9/4/2024 by JOB Carranza.  Previously evaluated by Dr. Jesús Hollingsworth on 5/11/2022.    Past Medical History: GERD, hypertension, back pain S/P laminectomy, moderate persistent asthma, BEATRIZ on CPAP, seasonal allergies, allergic rhinitis followed by ENT, chronic insomnia, morbid obesity, mucopurulent chronic bronchitis, chronic kidney disease stage G3a/A1, depression, pulmonary hypertension, joint pain.     Vitals:  /74 (BP Location: Right arm, Patient Position: Sitting, BP Cuff Size: Adult)   Pulse 77   Resp 14   Ht 1.562 m (5' 1.5\")   Wt (!) 133 kg (294 lb)   SpO2 97% BMI of 54.65 kg/m².    Recent Imaging: Chest x-ray obtained 12/7/2024 demonstrating no acute cardiopulmonary process.    Echocardiogram obtained 11/26/2018 demonstrated normal left ventricular chamber size, wall thickness, systolic and diastolic function.  LVEF estimated at 65%.  Normal right ventricular size and systolic function.  Trace mitral regurgitation.     Currently using  Resmed auto CPAP @15-18 cm H20 pressure; compliance reviewed for 11/20/2024 through 12/19/2024, days used 29/30, average daily usage 6 hours 49 minutes, 93% of days greater than or equal to 4 hours, mask leak at  51.8 LPM at 95th percentile, AHI 1.6 per hour.  See media for full report.    Device obtained 8/18/2021  DME provider [facility]  Mask interface fullface mask    Initial polysomnogram demonstrated findings consistent with mild obstructive sleep apnea with overall AHI of 12.4 and low O2 sat of 84%. She was titrated to CPAP of 16 but felt this was excessive and subsequently pressure was decreased to 14 for tolerance.      Polysomnogram titration study " obtained 3/30/2017 demonstrating low O2 sat of 81% with sats less than or equal to 89% for 2.8 minutes of recorded sleep time.  Successful CPAP titration to 14 cm H2O pressure with fullface mask.  Rare PVCs were noted.     Sleep schedule goes to bed 8 PM asleep by 9 PM, wakens 6 AM , and gets up during the night bathroom x 3   Symptoms occasional day time somnolence, denies morning headache, fatigue, occasionally naps for 30 minutes during the day.  Patient does take trazodone as a sleep aid.    [place] Sleepiness Scale reported as 2/24 on 12/19/2024          Review of Systems   Constitutional:  Negative for chills, fever, malaise/fatigue and weight loss.   HENT:  Positive for congestion (uses budesonide twice daily), hearing loss (mild left post infection) and tinnitus (occasional). Negative for nosebleeds, sinus pain and sore throat.    Eyes:         Presc glasses    Respiratory:  Positive for wheezing. Negative for cough, sputum production and shortness of breath.    Cardiovascular:  Negative for chest pain, palpitations, orthopnea and leg swelling.   Gastrointestinal:  Positive for heartburn (controlled on meds).        No dentures, missing two molars, rare swallowing issues    Neurological:  Positive for dizziness (with position change), tremors (medication related) and headaches (occasional).   Psychiatric/Behavioral:  The patient does not have insomnia.        Past Medical History:   Diagnosis Date    Allergic rhinitis     Apnea, sleep     Arthritis     Asthma     inhalers    Back pain     Bowel habit changes     constipation    Bronchiectasis (HCC)     Bronchitis     Carpal tunnel syndrome     Chest pain     Chest tightness     Cold 4-8-2017    cold; taking antibiotics  4-    Constipation     Cough     Daytime sleepiness     Depression     Difficulty breathing     Difficulty swallowing     DJD (degenerative joint disease)     Frequent urination     GERD (gastroesophageal reflux disease)     GERD  "(gastroesophageal reflux disease)     Hearing difficulty     Heart burn     Heartburn     Hiatus hernia syndrome     Hyperglycemia 5/31/2013    Hypertension     Influenza     Insomnia     Lumps on the skin     Nasal drainage     Obesity     Obstructive sleep apnea     Pain     back, knees    Painful joint     Palpitations     Pneumonia     Pulmonary hypertension (HCC)     Rash     Rhinitis     Shortness of breath     Skin change     Sleep apnea     CPAP    Snoring     Sore muscles     Sore throat, chronic     Sorethroat 2/09/2016    Swelling of lower extremity     Thrush     from \"Advair\", takes nystatin    Tonsillitis     Urinary incontinence     Wears glasses     Wheezing        Past Surgical History:   Procedure Laterality Date    SINUS WASHING Bilateral 4/22/2021    Procedure: IRRIGATION, PARANASAL SINUS - FOR INTRAOP POSTOP SINUS DEBRIDEMENT.;  Surgeon: Yon Rojas M.D.;  Location: SURGERY SAME DAY [facility];  Service: Ent    MT REPAIR OF NASAL SEPTUM  4/15/2021    Procedure: SEPTOPLASTY, NOSE;  Surgeon: Yon Rojas M.D.;  Location: SURGERY SAME DAY [facility];  Service: Ent    ENDOSCOPY, PARANASAL SINUS, WITH TOTAL ETHMOIDECTOMY, SS Bilateral 4/15/2021    Procedure: ENDOSCOPY, PARANASAL SINUS, WITH TOTAL ETHMOIDECTOMY, SPHENOIDOTOMY, MAXILLARY ANTROSTOMY, MAXILLARY SINUS TISS REM, AND EXPLOR FRONT SINUS;  Surgeon: Yon Rojas M.D.;  Location: SURGERY SAME DAY [facility];  Service: Ent    STEREOTACTIC COMPUTER ASSISTED PROCEDURE CRANIAL, EXTRADURAL  4/15/2021    Procedure: STEREOTACTIC COMPUTER ASSISTED PROCEDURE CRANIAL,EXTRADURAL;  Surgeon: Yon Rojas M.D.;  Location: SURGERY SAME DAY [facility];  Service: Ent    SINUS TREPHINE FRONTAL  4/15/2021    Procedure: SINUSOTOMY, FRONTAL SINUS, USING TREPHINE-TYPE DEVICE - ENDOSCOPIC.;  Surgeon: Yon Rojas M.D.;  Location: SURGERY SAME DAY [facility];  Service: Ent    KNEE ARTHROPLASTY TOTAL Right 5/1/2017    Procedure: KNEE ARTHROPLASTY TOTAL;  Surgeon: " Jesús Rutledge M.D.;  Location: SURGERY [facility];  Service:     KNEE ARTHROPLASTY TOTAL Left 3/7/2016    Procedure: KNEE ARTHROPLASTY TOTAL;  Surgeon: Jesús Rutledge M.D.;  Location: SURGERY [facility];  Service:     FUSION, SPINE, LUMBAR, PLIF  9/13/2013    Performed by Kaushal Ayala M.D. at SURGERY [facility]    LUMBAR LAMINECTOMY DISKECTOMY  9/13/2013    Performed by Kaushal Ayala M.D. at SURGERY [facility]    LUMBAR LAMINECTOMY DISKECTOMY  5/30/2013    Performed by Kaushal Ayala M.D. at SURGERY [facility]    CHOLECYSTECTOMY  2010    laparoscopic    OTHER NEUROLOGICAL SURG  2/2009    I&D of brain from sinus infection    FORAMINOTOMY  11/26/08    Performed by [facility] at SURGERY [facility]    HARDWARE REMOVAL NEURO  11/26/08    Performed by [facility] at SURGERY [facility]    KNEE ARTHROSCOPY Right 2008    FUSION, SPINE, LUMBAR, PLIF  2005    CARPAL TUNNEL RELEASE Right 2000    SHOULDER ARTHROTOMY Right 2000    ARTHROSCOPY, KNEE      EYE SURGERY Bilateral     Cataract surgery 2/2018, 3/2018    LAMINOTOMY      VT REMV 2ND CATARACT,CORN-SCLER SECTN      TONSILLECTOMY         Family History   Problem Relation Age of Onset    Heart Disease Father     Arthritis Father     Lung Disease Father         asthma    Psychiatric Illness Father         depression    Alcohol/Drug Father     Depression Father     Hypertension Mother     Cancer Mother     Heart Disease Mother     Arthritis Mother     Stroke Mother     Hyperlipidemia Other     Alcohol/Drug Brother     Depression Daughter     Drug abuse Son         in remission    Bipolar disorder Grandchild     Genetic Disorder Neg Hx     Diabetes Neg Hx        Social History     Socioeconomic History    Marital status:      Spouse name: Not on file    Number of children: Not on file    Years of education: Not on file    Highest education level: Not on file   Occupational History    Not on file   Tobacco Use     Smoking status: Former     Current packs/day: 0.00     Average packs/day: 1 pack/day for 6.0 years (6.0 ttl pk-yrs)     Types: Cigarettes     Start date: 1972     Quit date: 1978     Years since quittin.9    Smokeless tobacco: Never    Tobacco comments:        Vaping Use    Vaping status: Never Used   Substance and Sexual Activity    Alcohol use: No     Alcohol/week: 0.0 oz    Drug use: No    Sexual activity: Not on file   Other Topics Concern    Not on file   Social History Narrative    ** Merged History Encounter **          Social Drivers of Health     Financial Resource Strain: Not on file   Food Insecurity: Not on file   Transportation Needs: Not on file   Physical Activity: Not on file   Stress: Not on file   Social Connections: Not on file   Intimate Partner Violence: Not on file   Housing Stability: Not on file       Allergies as of 2024 - Reviewed 2024   Allergen Reaction Noted    Allergy  2013    Cefepime Hives 2010    Cephalosporins Hives 2010    Clarithromycin Hives 2010    Cleocin [clindamycin] Itching 2016    Meropenem Hives 2013    Morphine Itching 2013    Sulfamethoxazole-trimethoprim Itching 2013          Current medications as of today   Current Outpatient Medications   Medication Sig Dispense Refill    famotidine (PEPCID) 40 MG Tab Take 40 mg by mouth every day.      fexofenadine (ALLEGRA) 180 MG tablet Take 180 mg by mouth every day.      Docusate Calcium (STOOL SOFTENER PO) Take  by mouth 2 Times a Day.      Multiple Vitamins-Minerals (PRESERVISION AREDS 2 PO) Take  by mouth 2 Times a Day.      benzonatate (TESSALON) 100 MG Cap Take 100 mg by mouth 3 times a day as needed for Cough.      amLODIPine (NORVASC) 10 MG Tab TAKE 1 TABLET BY MOUTH ONCE DAILY. REPLACES 5 MG  2    ipratropium-albuterol (DUONEB) 0.5-2.5 (3) MG/3ML nebulizer solution 3 mL by Nebulization route 4 times a day.      fluticasone-salmeterol (ADVAIR)  500-50 MCG/DOSE AEROSOL POWDER, BREATH ACTIVATED Inhale 1 Puff by mouth every 12 hours.      multivitamin (THERAGRAN) Tab Take 1 Tab by mouth every day.      Multiple Vitamins-Minerals (HAIR SKIN AND NAILS FORMULA) Tab Take 1 Tab by mouth every day.      duloxetine (CYMBALTA) 60 MG Cap DR Particles delayed-release capsule Take 60 mg by mouth every day.      albuterol 108 (90 Base) MCG/ACT Aero Soln inhalation aerosol Inhale 2 Puffs by mouth every 6 hours as needed for Shortness of Breath. Shortness of breath      losartan (COZAAR) 100 MG TABS Take 100 mg by mouth every day.      nystatin (MYCOSTATIN) 809782 UNIT/ML SUSP Take 100,000 Units by mouth 5 Times a Day.      montelukast (SINGULAIR) 10 MG TABS Take 10 mg by mouth every morning.      trazodone (DESYREL) 50 MG TABS Take 50 mg by mouth every evening.      omeprazole (PRILOSEC) 20 MG CPDR Take 20 mg by mouth 2 times a day.      Azelastine HCl 137 MCG/SPRAY Solution USE 2 SPRAY(S) IN EACH NOSTRIL TWICE DAILY (Patient not taking: Reported on 12/19/2024)      Amoxicillin 500 MG Tab Take  by mouth. Prior to dental (Patient not taking: Reported on 12/19/2024)      gabapentin (NEURONTIN) 300 MG Cap Take 300 mg by mouth 4 times a day. (Patient not taking: Reported on 8/5/2024)       No current facility-administered medications for this visit.         Physical Exam:   Gen:           Alert and oriented, No apparent distress. Mood and affect appropriate, normal interaction with examiner.   Hearing:     Grossly intact.  Nose:          Normal, no lesions or deformities.  Dentition:    Fair dentition.   Oropharynx:   Tongue normal, posterior pharynx without erythema or exudate.  Mallampati Classification: IV  Neck:        Supple, trachea midline, no masses.  Respiratory Effort: No intercostal retractions or use of accessory muscles.   Gait and Station: Grossly normal.  Digits and Nails: No clubbing, cyanosis, petechiae, or nodes.   Skin:        No rashes, lesions or ulcers  noted.               Ext:           No cyanosis, trace lower extremity edema.      Immunizations:  Flu: 10/25/2024  Pneumovax 23: 10/21/2014  Prevnar 13: 10/26/2015  PCV 20: 6/22/2023  Pfizer SARS CoV2 Vaccine: 10/2/2021, 3/6/2021, 2/16/2021  Pfizer booster SARS-CoV-2 vaccine: 1/4/2023    Assessment / Plan:  1. BEATRIZ on CPAP  - DME Other  - DME Mask Fitting; Future    Reviewed current compliance, device malfunction morning noted.  Will send updated order to repair or replace to [facility].  Additionally severe mask leak noted and mask fitting ordered.  Patient reports continued use and benefit.  Was reminded to use distilled water only, reviewed equipment cleaning and equipment replacement schedule.  Will need short-term follow-up for first compliance on new device, patient is instructed to bring entire device to clinic for first follow-up visit and states understanding.      Follow-up:   Return in about 3 months (around 3/19/2025) for Return with Nataliya Olson PA-C.    Please note that this dictation was created using voice recognition software. I have made every reasonable attempt to correct obvious errors, but it is possible there are errors of grammar and possibly content that I did not discover before finalizing the note.

## 2024-12-19 NOTE — PATIENT INSTRUCTIONS
1-reviewed current compliance  2-device malfunction warning noted  3-send updated order to repair or replace to [facility]  4-severe mask leak noted  5-mask fitting ordered  6-demonstrating consistent use and benefit   7-As a reminder use distilled water only in humidifier chamber.    8-Today we reviewed equipment cleaning  once weekly minimum  mask, tubing and water chamber  use dedicated container  use mild soap and water  SoClean or other ozone  are not recommended  white vinegar and water solution is no longer recommended  hang tubing to dry  mask sanitizing wipes are an option for use   9-Equipment replacement schedule : mask change frequency dependent on type, head gear every 6 months, Tubing every 3 months, Ultra-fine filters 2 times per month, Humidifier chamber every 6 months

## 2025-01-16 ENCOUNTER — TELEPHONE (OUTPATIENT)
Dept: SLEEP MEDICINE | Facility: MEDICAL CENTER | Age: 80
End: 2025-01-16

## 2025-01-16 NOTE — TELEPHONE ENCOUNTER
Pt asking for CPAP RX to be re-sent to [facility]:  [phone]. Pt states sleep provider is aware of the issues with the previous CPAP machine. The Pt was told the machine cannot be repaired and needs to be replaced. Please contact Pt if you need further info.

## 2025-01-16 NOTE — TELEPHONE ENCOUNTER
Order placed, attached to last office visit.  Please let patient know and re-send orders to [facility]

## 2025-03-17 ENCOUNTER — HOSPITAL ENCOUNTER (OUTPATIENT)
Dept: LAB | Facility: MEDICAL CENTER | Age: 80
End: 2025-03-17
Attending: FAMILY MEDICINE
Payer: MEDICARE

## 2025-03-17 LAB
BASOPHILS # BLD AUTO: 0.7 % (ref 0–1.8)
BASOPHILS # BLD: 0.05 K/UL (ref 0–0.12)
CREAT UR-MCNC: 109 MG/DL
EOSINOPHIL # BLD AUTO: 0.19 K/UL (ref 0–0.51)
EOSINOPHIL NFR BLD: 2.7 % (ref 0–6.9)
ERYTHROCYTE [DISTWIDTH] IN BLOOD BY AUTOMATED COUNT: 51.2 FL (ref 35.9–50)
EST. AVERAGE GLUCOSE BLD GHB EST-MCNC: 108 MG/DL
HBA1C MFR BLD: 5.4 % (ref 4–5.6)
HCT VFR BLD AUTO: 44.1 % (ref 37–47)
HGB BLD-MCNC: 14.5 G/DL (ref 12–16)
IMM GRANULOCYTES # BLD AUTO: 0.03 K/UL (ref 0–0.11)
IMM GRANULOCYTES NFR BLD AUTO: 0.4 % (ref 0–0.9)
LYMPHOCYTES # BLD AUTO: 1.28 K/UL (ref 1–4.8)
LYMPHOCYTES NFR BLD: 18.5 % (ref 22–41)
MCH RBC QN AUTO: 31.5 PG (ref 27–33)
MCHC RBC AUTO-ENTMCNC: 32.9 G/DL (ref 32.2–35.5)
MCV RBC AUTO: 95.7 FL (ref 81.4–97.8)
MICROALBUMIN UR-MCNC: <1.2 MG/DL
MICROALBUMIN/CREAT UR: NORMAL MG/G (ref 0–30)
MONOCYTES # BLD AUTO: 0.72 K/UL (ref 0–0.85)
MONOCYTES NFR BLD AUTO: 10.4 % (ref 0–13.4)
NEUTROPHILS # BLD AUTO: 4.65 K/UL (ref 1.82–7.42)
NEUTROPHILS NFR BLD: 67.3 % (ref 44–72)
NRBC # BLD AUTO: 0 K/UL
NRBC BLD-RTO: 0 /100 WBC (ref 0–0.2)
PLATELET # BLD AUTO: 236 K/UL (ref 164–446)
PMV BLD AUTO: 10.8 FL (ref 9–12.9)
RBC # BLD AUTO: 4.61 M/UL (ref 4.2–5.4)
WBC # BLD AUTO: 6.9 K/UL (ref 4.8–10.8)

## 2025-03-17 PROCEDURE — 80053 COMPREHEN METABOLIC PANEL: CPT

## 2025-03-17 PROCEDURE — 82043 UR ALBUMIN QUANTITATIVE: CPT

## 2025-03-17 PROCEDURE — 82570 ASSAY OF URINE CREATININE: CPT

## 2025-03-17 PROCEDURE — 85025 COMPLETE CBC W/AUTO DIFF WBC: CPT

## 2025-03-17 PROCEDURE — 80061 LIPID PANEL: CPT

## 2025-03-17 PROCEDURE — 36415 COLL VENOUS BLD VENIPUNCTURE: CPT

## 2025-03-17 PROCEDURE — 83036 HEMOGLOBIN GLYCOSYLATED A1C: CPT

## 2025-03-18 ENCOUNTER — TELEPHONE (OUTPATIENT)
Dept: SLEEP MEDICINE | Facility: MEDICAL CENTER | Age: 80
End: 2025-03-18
Payer: MEDICARE

## 2025-03-18 LAB
ALBUMIN SERPL BCP-MCNC: 3.8 G/DL (ref 3.2–4.9)
ALBUMIN/GLOB SERPL: 1.4 G/DL
ALP SERPL-CCNC: 69 U/L (ref 30–99)
ALT SERPL-CCNC: 17 U/L (ref 2–50)
ANION GAP SERPL CALC-SCNC: 11 MMOL/L (ref 7–16)
AST SERPL-CCNC: 21 U/L (ref 12–45)
BILIRUB SERPL-MCNC: 0.6 MG/DL (ref 0.1–1.5)
BUN SERPL-MCNC: 15 MG/DL (ref 8–22)
CALCIUM ALBUM COR SERPL-MCNC: 9.2 MG/DL (ref 8.5–10.5)
CALCIUM SERPL-MCNC: 9 MG/DL (ref 8.5–10.5)
CHLORIDE SERPL-SCNC: 106 MMOL/L (ref 96–112)
CHOLEST SERPL-MCNC: 152 MG/DL (ref 100–199)
CO2 SERPL-SCNC: 23 MMOL/L (ref 20–33)
CREAT SERPL-MCNC: 1.06 MG/DL (ref 0.5–1.4)
GFR SERPLBLD CREATININE-BSD FMLA CKD-EPI: 53 ML/MIN/1.73 M 2
GLOBULIN SER CALC-MCNC: 2.8 G/DL (ref 1.9–3.5)
GLUCOSE SERPL-MCNC: 103 MG/DL (ref 65–99)
HDLC SERPL-MCNC: 47 MG/DL
LDLC SERPL CALC-MCNC: 85 MG/DL
POTASSIUM SERPL-SCNC: 3.9 MMOL/L (ref 3.6–5.5)
PROT SERPL-MCNC: 6.6 G/DL (ref 6–8.2)
SODIUM SERPL-SCNC: 140 MMOL/L (ref 135–145)
TRIGL SERPL-MCNC: 100 MG/DL (ref 0–149)

## 2025-03-19 ENCOUNTER — OFFICE VISIT (OUTPATIENT)
Dept: SLEEP MEDICINE | Facility: MEDICAL CENTER | Age: 80
End: 2025-03-19
Attending: PHYSICIAN ASSISTANT
Payer: MEDICARE

## 2025-03-19 VITALS
SYSTOLIC BLOOD PRESSURE: 124 MMHG | BODY MASS INDEX: 50.97 KG/M2 | RESPIRATION RATE: 18 BRPM | HEART RATE: 77 BPM | OXYGEN SATURATION: 95 % | WEIGHT: 277 LBS | HEIGHT: 62 IN | DIASTOLIC BLOOD PRESSURE: 72 MMHG

## 2025-03-19 DIAGNOSIS — G47.33 OSA ON CPAP: ICD-10-CM

## 2025-03-19 PROCEDURE — 99213 OFFICE O/P EST LOW 20 MIN: CPT | Performed by: PHYSICIAN ASSISTANT

## 2025-03-19 PROCEDURE — 3078F DIAST BP <80 MM HG: CPT | Performed by: PHYSICIAN ASSISTANT

## 2025-03-19 PROCEDURE — 3074F SYST BP LT 130 MM HG: CPT | Performed by: PHYSICIAN ASSISTANT

## 2025-03-19 RX ORDER — SEMAGLUTIDE 0.68 MG/ML
INJECTION, SOLUTION SUBCUTANEOUS
COMMUNITY
Start: 2025-03-18

## 2025-03-19 ASSESSMENT — ENCOUNTER SYMPTOMS
COUGH: 0
TREMORS: 0
SPUTUM PRODUCTION: 0
SORE THROAT: 0
FEVER: 0
INSOMNIA: 0
DIZZINESS: 0
PALPITATIONS: 0
SINUS PAIN: 0
WHEEZING: 0
ORTHOPNEA: 0
SHORTNESS OF BREATH: 0
HEADACHES: 0
CHILLS: 0
WEIGHT LOSS: 0
HEARTBURN: 0

## 2025-03-19 ASSESSMENT — FIBROSIS 4 INDEX: FIB4 SCORE: 1.7

## 2025-03-19 NOTE — PATIENT INSTRUCTIONS
1-[facility] contacted again today  2-all paperwork needed for replacement of malfunction cpap device was confirmed and order to be expedited   3-follow up with [facility] in 2 weeks if no word   4-consider filing complaint with [facility] if availability remains a problem  5-will need 2-3 month follow up visit once receives new device  6-bring entire device to follow up visit

## 2025-03-19 NOTE — PROGRESS NOTES
"Chief Complaint   Patient presents with    Apnea     Last Office Visit 12/19/24 with Nataliya Olson P.A.-C    Settings:AutoCPAP 15-18    Device obtained 8/18/2021       HPI:  Cyndy Petit is a 79 y.o. year old female here today for follow-up on obstructive sleep apnea.  Patient last seen in clinic 12/19/2024 by JOB Carranza.  Previously evaluated by Dr. Jesús Hollingsworth on 5/11/2022.    Past Medical History: GERD, hypertension, back pain S/P laminectomy, moderate persistent asthma, BEATRIZ on CPAP, seasonal allergies, allergic rhinitis followed by ENT, chronic insomnia, morbid obesity, mucopurulent chronic bronchitis, chronic kidney disease stage G3a/A1, depression, pulmonary hypertension, joint pain.     Vitals:  /72 (BP Location: Left arm, Patient Position: Sitting, BP Cuff Size: Adult)   Pulse 77   Resp 18   Ht 1.562 m (5' 1.5\")   Wt (!) 126 kg (277 lb)   SpO2 95% BMI of 51.49 kg/m²    Recent Imaging:  Chest x-ray obtained 12/7/2024 demonstrating no acute cardiopulmonary process.     Echocardiogram obtained 11/26/2018 demonstrated normal left ventricular chamber size, wall thickness, systolic and diastolic function. LVEF estimated at 65%. Normal right ventricular size and systolic function. Trace mitral regurgitation.     Currently using  Resmed auto CPAP @15-18 cm H20 pressure; compliance reviewed for 2/17/2025 through 3/18/2025, days used 30/30, average daily usage 6 hours 57 minutes, 97% of days greater than or equal to 4 hours, mask leak at 55.5 LPM at 95th percentile, AHI 1 per hour.  Severe mask leak noted, see media for full report.    Device obtained 8/18/2021  DME provider Preferred  Mask interface fullface mask    Initial polysomnogram demonstrated findings consistent with mild obstructive sleep apnea with overall AHI of 12.4 and low O2 sat of 84%. She was titrated to CPAP of 16 but felt this was excessive and subsequently pressure was decreased to 14 for tolerance.     "   Polysomnogram titration study obtained 3/30/2017 demonstrating low O2 sat of 81% with sats less than or equal to 89% for 2.8 minutes of recorded sleep time.  Successful CPAP titration to 14 cm H2O pressure with fullface mask.  Rare PVCs were noted.     Sleep schedule goes to bed 8 PM-9 PM, wakens 6 AM , and gets up during the night bathroom x 2   Symptoms occasional day time somnolence, denies morning headache, fatigue, occasionally naps for 30 minutes during the day.  Patient does take trazodone as a sleep aid.     [place] Sleepiness Scale Total Score: 2 (12/19/2024 11:30 AM)       Review of Systems   Constitutional:  Positive for malaise/fatigue (moderate). Negative for chills, fever and weight loss.   HENT:  Negative for congestion, hearing loss, nosebleeds, sinus pain, sore throat and tinnitus.    Eyes:         Presc glasses    Respiratory:  Negative for cough, sputum production, shortness of breath and wheezing.    Cardiovascular:  Negative for chest pain, palpitations, orthopnea and leg swelling.   Gastrointestinal:  Negative for heartburn.   Neurological:  Negative for dizziness, tremors and headaches.   Psychiatric/Behavioral:  The patient does not have insomnia.        Past Medical History:   Diagnosis Date    Allergic rhinitis     Apnea, sleep     Arthritis     Asthma     inhalers    Back pain     Bowel habit changes     constipation    Bronchiectasis (HCC)     Bronchitis     Carpal tunnel syndrome     Chest pain     Chest tightness     Cold 4-8-2017    cold; taking antibiotics  4-    Constipation     Cough     Daytime sleepiness     Depression     Difficulty breathing     Difficulty swallowing     DJD (degenerative joint disease)     Frequent urination     GERD (gastroesophageal reflux disease)     GERD (gastroesophageal reflux disease)     Hearing difficulty     Heart burn     Heartburn     [place] hernia syndrome     Hyperglycemia 5/31/2013    Hypertension     Influenza     Insomnia     Lumps on  "the skin     Nasal drainage     Obesity     Obstructive sleep apnea     Pain     back, knees    Painful joint     Palpitations     Pneumonia     Pulmonary hypertension (HCC)     Rash     Rhinitis     Shortness of breath     Skin change     Sleep apnea     CPAP    Snoring     Sore muscles     Sore throat, chronic     Sorethroat 2/09/2016    Swelling of lower extremity     Thrush     from \"Advair\", takes nystatin    Tonsillitis     Urinary incontinence     Wears glasses     Wheezing        Past Surgical History:   Procedure Laterality Date    SINUS WASHING Bilateral 4/22/2021    Procedure: IRRIGATION, PARANASAL SINUS - FOR INTRAOP POSTOP SINUS DEBRIDEMENT.;  Surgeon: Yon Rojas M.D.;  Location: SURGERY SAME [facility];  Service: Ent    SD REPAIR OF NASAL SEPTUM  4/15/2021    Procedure: SEPTOPLASTY, NOSE;  Surgeon: Yon Rojas M.D.;  Location: SURGERY SAME [facility];  Service: Ent    ENDOSCOPY, PARANASAL SINUS, WITH TOTAL ETHMOIDECTOMY, SS Bilateral 4/15/2021    Procedure: ENDOSCOPY, PARANASAL SINUS, WITH TOTAL ETHMOIDECTOMY, SPHENOIDOTOMY, MAXILLARY ANTROSTOMY, MAXILLARY SINUS TISS REM, AND EXPLOR FRONT SINUS;  Surgeon: Yon Rojas M.D.;  Location: SURGERY SAME [facility];  Service: Ent    STEREOTACTIC COMPUTER ASSISTED PROCEDURE CRANIAL, EXTRADURAL  4/15/2021    Procedure: STEREOTACTIC COMPUTER ASSISTED PROCEDURE CRANIAL,EXTRADURAL;  Surgeon: Yon Rojas M.D.;  Location: SURGERY SAME [facility];  Service: Ent    SINUS TREPHINE FRONTAL  4/15/2021    Procedure: SINUSOTOMY, FRONTAL SINUS, USING TREPHINE-TYPE DEVICE - ENDOSCOPIC.;  Surgeon: Yon Rojas M.D.;  Location: SURGERY [facility];  Service: Ent    KNEE ARTHROPLASTY TOTAL Right 5/1/2017    Procedure: KNEE ARTHROPLASTY TOTAL;  Surgeon: Jesús Rutledge M.D.;  Location: [facility];  Service:     KNEE ARTHROPLASTY TOTAL Left 3/7/2016    Procedure: KNEE ARTHROPLASTY TOTAL;  Surgeon: Jesús Rutledge M.D.;  Location: " SURGERY [facility];  Service:     FUSION, SPINE, LUMBAR, PLIF  9/13/2013    Performed by Kaushal Ayala M.D. at SURGERY [facility]    LUMBAR LAMINECTOMY DISKECTOMY  9/13/2013    Performed by Kaushal Ayala M.D. at SURGERY [facility]    LUMBAR LAMINECTOMY DISKECTOMY  5/30/2013    Performed by Kaushal Ayala M.D. at SURGERY [facility]    CHOLECYSTECTOMY  2010    laparoscopic    OTHER NEUROLOGICAL SURG  2/2009    I&D of brain from sinus infection    FORAMINOTOMY  11/26/08    Performed by [facility] at SURGERY [facility]    HARDWARE REMOVAL NEURO  11/26/08    Performed by [facility] at SURGERY [facility]    KNEE ARTHROSCOPY Right 2008    FUSION, SPINE, LUMBAR, PLIF  2005    CARPAL TUNNEL RELEASE Right 2000    SHOULDER ARTHROTOMY Right 2000    ARTHROSCOPY, KNEE      EYE SURGERY Bilateral     Cataract surgery 2/2018, 3/2018    LAMINOTOMY      IA REMV 2ND CATARACT,CORN-SCLER SECTN      TONSILLECTOMY         Family History   Problem Relation Age of Onset    Heart Disease Father     Arthritis Father     Lung Disease Father         asthma    Psychiatric Illness Father         depression    Alcohol/Drug Father     Depression Father     Hypertension Mother     Cancer Mother     Heart Disease Mother     Arthritis Mother     Stroke Mother     Hyperlipidemia Other     Alcohol/Drug Brother     Depression Daughter     Drug abuse Son         in remission    Bipolar disorder Grandchild     Genetic Disorder Neg Hx     Diabetes Neg Hx        Social History     Socioeconomic History    Marital status:      Spouse name: Not on file    Number of children: Not on file    Years of education: Not on file    Highest education level: Not on file   Occupational History    Not on file   Tobacco Use    Smoking status: Former     Current packs/day: 0.00     Average packs/day: 1 pack/day for 6.0 years (6.0 ttl pk-yrs)     Types: Cigarettes     Start date: 1/1/1972     Quit date: 1/1/1978      Years since quittin.2    Smokeless tobacco: Never    Tobacco comments:        Vaping Use    Vaping status: Never Used   Substance and Sexual Activity    Alcohol use: No     Alcohol/week: 0.0 oz    Drug use: No    Sexual activity: Not on file   Other Topics Concern    Not on file   Social History Narrative    ** Merged History Encounter **          Social Drivers of Health     Financial Resource Strain: Not on file   Food Insecurity: Not on file   Transportation Needs: Not on file   Physical Activity: Not on file   Stress: Not on file   Social Connections: Not on file   Intimate Partner Violence: Not on file   Housing Stability: Not on file       Allergies as of 2025 - Reviewed 2025   Allergen Reaction Noted    Allergy  2013    Cefepime Hives 2010    Cephalosporins Hives 2010    Clarithromycin Hives 2010    Cleocin [clindamycin] Itching 2016    Meropenem Hives 2013    Morphine Itching 2013    Sulfamethoxazole-trimethoprim Itching 2013          Current medications as of today   Current Outpatient Medications   Medication Sig Dispense Refill    OZEMPIC, 0.25 OR 0.5 MG/DOSE, 2 MG/3ML Solution Pen-injector       famotidine (PEPCID) 40 MG Tab Take 40 mg by mouth every day.      fexofenadine (ALLEGRA) 180 MG tablet Take 180 mg by mouth every day.      Docusate Calcium (STOOL SOFTENER PO) Take  by mouth 2 Times a Day.      Multiple Vitamins-Minerals (PRESERVISION AREDS 2 PO) Take  by mouth 2 Times a Day.      benzonatate (TESSALON) 100 MG Cap Take 100 mg by mouth 3 times a day as needed for Cough.      amLODIPine (NORVASC) 10 MG Tab TAKE 1 TABLET BY MOUTH ONCE DAILY. REPLACES 5 MG  2    ipratropium-albuterol (DUONEB) 0.5-2.5 (3) MG/3ML nebulizer solution 3 mL by Nebulization route 4 times a day.      fluticasone-salmeterol (ADVAIR) 500-50 MCG/DOSE AEROSOL POWDER, BREATH ACTIVATED Inhale 1 Puff by mouth every 12 hours.      multivitamin (THERAGRAN) Tab Take  1 Tab by mouth every day.      Multiple Vitamins-Minerals (HAIR SKIN AND NAILS FORMULA) Tab Take 1 Tab by mouth every day.      duloxetine (CYMBALTA) 60 MG Cap DR Particles delayed-release capsule Take 60 mg by mouth every day.      albuterol 108 (90 Base) MCG/ACT Aero Soln inhalation aerosol Inhale 2 Puffs by mouth every 6 hours as needed for Shortness of Breath. Shortness of breath      losartan (COZAAR) 100 MG TABS Take 100 mg by mouth every day.      nystatin (MYCOSTATIN) 223324 UNIT/ML SUSP Take 100,000 Units by mouth 5 Times a Day.      montelukast (SINGULAIR) 10 MG TABS Take 10 mg by mouth every morning.      trazodone (DESYREL) 50 MG TABS Take 50 mg by mouth every evening.      omeprazole (PRILOSEC) 20 MG CPDR Take 20 mg by mouth 2 times a day.      Azelastine HCl 137 MCG/SPRAY Solution USE 2 SPRAY(S) IN EACH NOSTRIL TWICE DAILY (Patient not taking: No sig reported)      Amoxicillin 500 MG Tab Take  by mouth. Prior to dental (Patient not taking: Reported on 10/12/2021)      gabapentin (NEURONTIN) 300 MG Cap Take 300 mg by mouth 4 times a day. (Patient not taking: Reported on 8/5/2024)       No current facility-administered medications for this visit.         Physical Exam:   Gen:           Alert and oriented, No apparent distress. Mood and affect appropriate, normal interaction with examiner.   Hearing:     Grossly intact.  Nose:          Normal, no lesions or deformities.  Dentition:    Fair dentition.   Oropharynx:   Tongue normal, posterior pharynx without erythema or exudate.  Mallampati Classification: IV  Neck:        Supple, trachea midline, no masses.  Respiratory Effort: No intercostal retractions or use of accessory muscles.   Gait and Station: Grossly normal.  Digits and Nails: No clubbing, cyanosis, petechiae, or nodes.   Skin:        No rashes, lesions or ulcers noted.               Ext:           No cyanosis or edema.      Immunizations:  Flu: 10/25/2024  Pneumovax 23: 10/21/2014  Prevnar 13:  10/26/2015  PCV 20: 6/22/2023  Pfizer SARS CoV2 Vaccine: 10/2/2021, 3/16/2021, 2/16/2021  Pfizer booster SARS-CoV-2 vaccine: 1/4/2023    Assessment / Plan:  1. BEATRIZ on CPAP    Patient continues to struggle with malfunctioning device, DME company which is preferred was contacted again today and confirmed that all paperwork needed for replacement of her malfunctioning CPAP device was in place and the order would be expedited.  Follow-up with preferred in 2 weeks if no word.  Consider filing complaint with [facility] if availability remains an issue.  Once receives new device patient will need 2 to 3-month follow-up visit for compliance documentation with new device.  This includes minimum usage of 4 hours/day most days as per insurance requirements.  Patient states understanding.  She was instructed to bring entire device to follow-up visit to assure access to data.  Patient is experiencing a severe mask leak despite fullface mask use which she attributes to device failure.     2. BMI 50.0-59.9, adult (HCC)    Elevated BMI: Discussion regarding impact central adiposity on pulmonary function.  Encouraged to increase activity as tolerated and monitor nutritional intake.        Follow-up:   Return in about 3 months (around 6/19/2025) for Return with Nataliya Olson PA-C.    Please note that this dictation was created using voice recognition software. I have made every reasonable attempt to correct obvious errors, but it is possible there are errors of grammar and possibly content that I did not discover before finalizing the note.

## 2025-03-25 ENCOUNTER — HOSPITAL ENCOUNTER (OUTPATIENT)
Facility: MEDICAL CENTER | Age: 80
End: 2025-03-25
Attending: FAMILY MEDICINE
Payer: MEDICARE

## 2025-03-25 LAB
APPEARANCE UR: ABNORMAL
BACTERIA #/AREA URNS HPF: ABNORMAL /HPF
BILIRUB UR QL STRIP.AUTO: NEGATIVE
CASTS URNS QL MICRO: ABNORMAL /LPF (ref 0–2)
COLOR UR: YELLOW
EPITHELIAL CELLS 1715: ABNORMAL /HPF (ref 0–5)
GLUCOSE UR STRIP.AUTO-MCNC: NEGATIVE MG/DL
KETONES UR STRIP.AUTO-MCNC: NEGATIVE MG/DL
LEUKOCYTE ESTERASE UR QL STRIP.AUTO: ABNORMAL
MICRO URNS: ABNORMAL
NITRITE UR QL STRIP.AUTO: POSITIVE
PH UR STRIP.AUTO: 7 [PH] (ref 5–8)
PROT UR QL STRIP: 30 MG/DL
RBC # URNS HPF: ABNORMAL /HPF (ref 0–2)
RBC UR QL AUTO: ABNORMAL
SP GR UR STRIP.AUTO: 1.01
UROBILINOGEN UR STRIP.AUTO-MCNC: 1 EU/DL
WBC #/AREA URNS HPF: >100 /HPF

## 2025-03-25 PROCEDURE — 81001 URINALYSIS AUTO W/SCOPE: CPT

## 2025-03-25 PROCEDURE — 87186 SC STD MICRODIL/AGAR DIL: CPT

## 2025-03-25 PROCEDURE — 87086 URINE CULTURE/COLONY COUNT: CPT

## 2025-03-25 PROCEDURE — 87077 CULTURE AEROBIC IDENTIFY: CPT

## 2025-03-27 LAB
BACTERIA UR CULT: ABNORMAL
BACTERIA UR CULT: ABNORMAL
SIGNIFICANT IND 70042: ABNORMAL
SITE SITE: ABNORMAL
SOURCE SOURCE: ABNORMAL

## 2025-06-16 ENCOUNTER — HOSPITAL ENCOUNTER (OUTPATIENT)
Dept: LAB | Facility: MEDICAL CENTER | Age: 80
End: 2025-06-16
Attending: FAMILY MEDICINE
Payer: MEDICARE

## 2025-06-16 LAB
25(OH)D3 SERPL-MCNC: 41 NG/ML (ref 30–100)
ALBUMIN SERPL BCP-MCNC: 3.8 G/DL (ref 3.2–4.9)
ALBUMIN/GLOB SERPL: 1.3 G/DL
ALP SERPL-CCNC: 79 U/L (ref 30–99)
ALT SERPL-CCNC: 17 U/L (ref 2–50)
ANION GAP SERPL CALC-SCNC: 9 MMOL/L (ref 7–16)
APPEARANCE UR: ABNORMAL
AST SERPL-CCNC: 23 U/L (ref 12–45)
BACTERIA #/AREA URNS HPF: ABNORMAL /HPF
BASOPHILS # BLD AUTO: 0.6 % (ref 0–1.8)
BASOPHILS # BLD: 0.05 K/UL (ref 0–0.12)
BILIRUB SERPL-MCNC: 0.7 MG/DL (ref 0.1–1.5)
BILIRUB UR QL STRIP.AUTO: NEGATIVE
BUN SERPL-MCNC: 16 MG/DL (ref 8–22)
CA OXALATE CRYSTAL  1765: PRESENT /HPF
CALCIUM ALBUM COR SERPL-MCNC: 9.1 MG/DL (ref 8.5–10.5)
CALCIUM SERPL-MCNC: 8.9 MG/DL (ref 8.5–10.5)
CASTS URNS QL MICRO: ABNORMAL /LPF (ref 0–2)
CHLORIDE SERPL-SCNC: 105 MMOL/L (ref 96–112)
CHOLEST SERPL-MCNC: 156 MG/DL (ref 100–199)
CO2 SERPL-SCNC: 25 MMOL/L (ref 20–33)
COLOR UR: ABNORMAL
CREAT SERPL-MCNC: 1.03 MG/DL (ref 0.5–1.4)
EOSINOPHIL # BLD AUTO: 0.19 K/UL (ref 0–0.51)
EOSINOPHIL NFR BLD: 2.4 % (ref 0–6.9)
EPITHELIAL CELLS 1715: >20 /HPF (ref 0–5)
ERYTHROCYTE [DISTWIDTH] IN BLOOD BY AUTOMATED COUNT: 50.9 FL (ref 35.9–50)
EST. AVERAGE GLUCOSE BLD GHB EST-MCNC: 105 MG/DL
GFR SERPLBLD CREATININE-BSD FMLA CKD-EPI: 55 ML/MIN/1.73 M 2
GLOBULIN SER CALC-MCNC: 2.9 G/DL (ref 1.9–3.5)
GLUCOSE SERPL-MCNC: 88 MG/DL (ref 65–99)
GLUCOSE UR STRIP.AUTO-MCNC: NEGATIVE MG/DL
HBA1C MFR BLD: 5.3 % (ref 4–5.6)
HCT VFR BLD AUTO: 45.5 % (ref 37–47)
HDLC SERPL-MCNC: 50 MG/DL
HGB BLD-MCNC: 14.9 G/DL (ref 12–16)
IMM GRANULOCYTES # BLD AUTO: 0.02 K/UL (ref 0–0.11)
IMM GRANULOCYTES NFR BLD AUTO: 0.3 % (ref 0–0.9)
KETONES UR STRIP.AUTO-MCNC: ABNORMAL MG/DL
LDLC SERPL CALC-MCNC: 92 MG/DL
LEUKOCYTE ESTERASE UR QL STRIP.AUTO: ABNORMAL
LYMPHOCYTES # BLD AUTO: 1.66 K/UL (ref 1–4.8)
LYMPHOCYTES NFR BLD: 20.9 % (ref 22–41)
MCH RBC QN AUTO: 31.6 PG (ref 27–33)
MCHC RBC AUTO-ENTMCNC: 32.7 G/DL (ref 32.2–35.5)
MCV RBC AUTO: 96.6 FL (ref 81.4–97.8)
MICRO URNS: ABNORMAL
MONOCYTES # BLD AUTO: 0.74 K/UL (ref 0–0.85)
MONOCYTES NFR BLD AUTO: 9.3 % (ref 0–13.4)
NEUTROPHILS # BLD AUTO: 5.3 K/UL (ref 1.82–7.42)
NEUTROPHILS NFR BLD: 66.5 % (ref 44–72)
NITRITE UR QL STRIP.AUTO: NEGATIVE
NRBC # BLD AUTO: 0 K/UL
NRBC BLD-RTO: 0 /100 WBC (ref 0–0.2)
PH UR STRIP.AUTO: 6.5 [PH] (ref 5–8)
PLATELET # BLD AUTO: 225 K/UL (ref 164–446)
PMV BLD AUTO: 10.1 FL (ref 9–12.9)
POTASSIUM SERPL-SCNC: 4.1 MMOL/L (ref 3.6–5.5)
PROT SERPL-MCNC: 6.7 G/DL (ref 6–8.2)
PROT UR QL STRIP: NEGATIVE MG/DL
RBC # BLD AUTO: 4.71 M/UL (ref 4.2–5.4)
RBC # URNS HPF: ABNORMAL /HPF (ref 0–2)
RBC UR QL AUTO: NEGATIVE
SODIUM SERPL-SCNC: 139 MMOL/L (ref 135–145)
SP GR UR STRIP.AUTO: 1.02
TRIGL SERPL-MCNC: 72 MG/DL (ref 0–149)
TSH SERPL-ACNC: 1.45 UIU/ML (ref 0.38–5.33)
UROBILINOGEN UR STRIP.AUTO-MCNC: 1 EU/DL
WBC # BLD AUTO: 8 K/UL (ref 4.8–10.8)
WBC #/AREA URNS HPF: ABNORMAL /HPF

## 2025-06-16 PROCEDURE — 82043 UR ALBUMIN QUANTITATIVE: CPT

## 2025-06-16 PROCEDURE — 36415 COLL VENOUS BLD VENIPUNCTURE: CPT

## 2025-06-16 PROCEDURE — 82570 ASSAY OF URINE CREATININE: CPT

## 2025-06-16 PROCEDURE — 84443 ASSAY THYROID STIM HORMONE: CPT

## 2025-06-16 PROCEDURE — 80061 LIPID PANEL: CPT

## 2025-06-16 PROCEDURE — 81001 URINALYSIS AUTO W/SCOPE: CPT

## 2025-06-16 PROCEDURE — 80053 COMPREHEN METABOLIC PANEL: CPT

## 2025-06-16 PROCEDURE — 85025 COMPLETE CBC W/AUTO DIFF WBC: CPT

## 2025-06-16 PROCEDURE — 83036 HEMOGLOBIN GLYCOSYLATED A1C: CPT

## 2025-06-16 PROCEDURE — 82306 VITAMIN D 25 HYDROXY: CPT

## 2025-06-18 LAB
COLLECT DURATION TIME SPEC: NORMAL HR
CREAT 24H UR-MCNC: 192 MG/DL
MICROALBUMIN 24H UR-MCNC: 1.8 MG/DL
MICROALBUMIN/CREAT 24H UR: 9 MG/G (ref 0–30)
SPECIMEN VOL ?TM UR: NORMAL ML

## 2025-07-08 ENCOUNTER — OFFICE VISIT (OUTPATIENT)
Dept: SLEEP MEDICINE | Facility: MEDICAL CENTER | Age: 80
End: 2025-07-08
Attending: PHYSICIAN ASSISTANT
Payer: MEDICARE

## 2025-07-08 VITALS
RESPIRATION RATE: 16 BRPM | WEIGHT: 264 LBS | OXYGEN SATURATION: 94 % | SYSTOLIC BLOOD PRESSURE: 120 MMHG | HEIGHT: 61 IN | DIASTOLIC BLOOD PRESSURE: 72 MMHG | BODY MASS INDEX: 49.84 KG/M2 | HEART RATE: 84 BPM

## 2025-07-08 DIAGNOSIS — G47.33 OSA ON CPAP: Primary | ICD-10-CM

## 2025-07-08 PROCEDURE — 99214 OFFICE O/P EST MOD 30 MIN: CPT | Performed by: PHYSICIAN ASSISTANT

## 2025-07-08 PROCEDURE — 3078F DIAST BP <80 MM HG: CPT | Performed by: PHYSICIAN ASSISTANT

## 2025-07-08 PROCEDURE — 99213 OFFICE O/P EST LOW 20 MIN: CPT | Performed by: PHYSICIAN ASSISTANT

## 2025-07-08 PROCEDURE — 3074F SYST BP LT 130 MM HG: CPT | Performed by: PHYSICIAN ASSISTANT

## 2025-07-08 ASSESSMENT — ENCOUNTER SYMPTOMS
PALPITATIONS: 1
WHEEZING: 1
SINUS PAIN: 0
COUGH: 1
SORE THROAT: 0
ROS GI COMMENTS: NO DENTURES, MISSING TWO TEETH, NO SWALLOWING ISSUES
HEADACHES: 1
SPUTUM PRODUCTION: 1
HEARTBURN: 1
ORTHOPNEA: 0
TREMORS: 1
SHORTNESS OF BREATH: 1
CHILLS: 0
INSOMNIA: 1
DIZZINESS: 1
FEVER: 0
WEIGHT LOSS: 0

## 2025-07-08 ASSESSMENT — FIBROSIS 4 INDEX: FIB4 SCORE: 1.96

## 2025-07-08 NOTE — PROGRESS NOTES
"Chief Complaint   Patient presents with    Follow-Up     SLEEP - ESTABLISHED PT CHART PREP COMPLETED ON 07/08/2025    Last Office Visit 03/19/2025 with Nataliya Olson P.A.-C.    1st Compliance: Yes? Received new cpap???    Device obtained 8/18/2021    DME:  [facility]    Settings:AutoCPAP 15-18    Wireless Data? NO, If not wireless contact pt to bring in device.            HPI:  Cyndy Petit is a 79 y.o. year old female here today for follow-up on obstructive sleep apnea and first compliance with new device.  Last seen in clinic 3/19/2025 by JOB Carranza.  Previously evaluated by Dr. Jesús Hollingsworth 5/11/2022.      Past Medical History: GERD, hypertension, back pain S/P laminectomy, moderate persistent asthma, BEATRIZ on CPAP, seasonal allergies, allergic rhinitis followed by ENT, chronic insomnia, morbid obesity, mucopurulent chronic bronchitis, chronic kidney disease stage G3a/A1, depression, pulmonary hypertension, joint pain.     Vitals:  /72 (BP Location: Left arm, Patient Position: Sitting, BP Cuff Size: Adult)   Pulse 84   Resp 16   Ht 1.549 m (5' 1\")   Wt 120 kg (264 lb)   SpO2 94% BMI of 51.49 kg/m².    Recent Imaging: Chest x-ray obtained 12/7/2024 demonstrated no acute cardiopulmonary process.    Echocardiogram obtained 11/26/2018 demonstrated normal left ventricular chamber size, wall thickness, systolic and diastolic function. LVEF estimated at 65%. Normal right ventricular size and systolic function. Trace mitral regurgitation.     Currently using  Resmed auto CPAP @15-18 cm H20 pressure; compliance reviewed for 4/9/2025 through 7/7/2025, days used 78/90, average daily usage 7 hours 4 minutes, 81% of days greater than or equal to 4 hours, mask leak at 40 LPM at 95th percentile, AHI 0.6 per hour.  See media for full report.    Device obtained 3/28/2025  DME provider [facility]  Mask interface fullface mask    Initial polysomnogram demonstrated findings consistent with mild " obstructive sleep apnea with overall AHI of 12.4 and low O2 sat of 84%. She was titrated to CPAP of 16 but felt this was excessive and subsequently pressure was decreased to 14 for tolerance.       Polysomnogram titration study obtained 3/30/2017 demonstrating low O2 sat of 81% with sats less than or equal to 89% for 2.8 minutes of recorded sleep time.  Successful CPAP titration to 14 cm H2O pressure with fullface mask.  Rare PVCs were noted.     Sleep schedule goes to bed 8-9 p.m., wakens 6 AM , and gets up during the night bathroom x 2   Symptoms denies morning headache and experiences occasional daytime somnolence for which she will take a nap of 30 minutes as needed.  She does use trazodone as a sleep aid.    Wise Sleepiness Scale reported as 2/24 on 12/19/2024.    Review of Systems   Constitutional:  Positive for malaise/fatigue (very). Negative for chills, fever and weight loss.   HENT:  Negative for congestion, hearing loss, nosebleeds, sinus pain, sore throat and tinnitus.    Eyes:         Presc glasses   Respiratory:  Positive for cough, sputum production (white), shortness of breath and wheezing (sometimes).    Cardiovascular:  Positive for palpitations (not lately). Negative for chest pain, orthopnea and leg swelling.        Chest feels tired    Gastrointestinal:  Positive for heartburn (increased on ozempic).        No dentures, missing two teeth, no swallowing issues    Neurological:  Positive for dizziness, tremors (left hand) and headaches (from eyes).   Psychiatric/Behavioral:  The patient has insomnia (occasional).        Past Medical History[1]    Past Surgical History[2]    Family History   Problem Relation Age of Onset    Heart Disease Father     Arthritis Father     Lung Disease Father         asthma    Psychiatric Illness Father         depression    Alcohol/Drug Father     Depression Father     Hypertension Mother     Cancer Mother     Heart Disease Mother     Arthritis Mother     Stroke  Mother     Hyperlipidemia Other     Alcohol/Drug Brother     Depression Daughter     Drug abuse Son         in remission    Bipolar disorder Grandchild     Genetic Disorder Neg Hx     Diabetes Neg Hx        Social History     Socioeconomic History    Marital status:      Spouse name: Not on file    Number of children: Not on file    Years of education: Not on file    Highest education level: Not on file   Occupational History    Not on file   Tobacco Use    Smoking status: Former     Current packs/day: 0.00     Average packs/day: 1 pack/day for 6.0 years (6.0 ttl pk-yrs)     Types: Cigarettes     Start date: 1972     Quit date: 1978     Years since quittin.5    Smokeless tobacco: Never    Tobacco comments:        Vaping Use    Vaping status: Never Used   Substance and Sexual Activity    Alcohol use: No     Alcohol/week: 0.0 oz    Drug use: No    Sexual activity: Not on file   Other Topics Concern    Not on file   Social History Narrative    ** Merged History Encounter **          Social Drivers of Health     Financial Resource Strain: Not on file   Food Insecurity: Not on file   Transportation Needs: Not on file   Physical Activity: Not on file   Stress: Not on file   Social Connections: Not on file   Intimate Partner Violence: Not on file   Housing Stability: Not on file       Allergies as of 2025 - Reviewed 2025   Allergen Reaction Noted    Allergy  2013    Cefepime Hives 2010    Cephalosporins Hives 2010    Clarithromycin Hives 2010    Cleocin [clindamycin] Itching 2016    Meropenem Hives 2013    Morphine Itching 2013    Sulfamethoxazole-trimethoprim Itching 2013          Current medications as of today Current Medications[3]      Physical Exam:   Gen:           Alert and oriented, No apparent distress. Mood and affect appropriate, normal interaction with examiner.   Hearing:     Grossly intact.  Nose:          Normal, no lesions  or deformities.  Dentition:    Fair dentition.   Oropharynx:   Tongue normal, posterior pharynx without erythema or exudate.  Mallampati Classification: IV  Neck:        Supple, trachea midline, no masses.  Respiratory Effort: No intercostal retractions or use of accessory muscles.   Gait and Station: Grossly normal.  Digits and Nails: No clubbing, cyanosis, petechiae, or nodes.   Skin:        No rashes, lesions or ulcers noted.               Ext:           No cyanosis or edema.      Immunizations:  Flu: 10/25/2024  Pneumovax 23: 10/21/2014  Prevnar 13: 10/26/2015  PCV 20: 6/22/2023  SARS CoV2 Vaccine: 1/4/2023, 10/2/2021, 3/16/2021, 2/16/2021    Assessment / Plan:  1. BEATRIZ on CPAP  - DME Other    Patient with increasing shortness of breath today 9, would benefit from pulmonary evaluation and is advised to check with primary regarding referral.  Completed overnight oximetry last night awaiting results.  O2 levels/saturations were good in clinic today.  Patient did receive her replacement CPAP, reviewed compliance, demonstrating use and benefit.  Met all first compliance requirements.  Pressure adjustment made due to patient feeling some difficulty breathing on device.  Short-term follow-up to reassess.    Follow-up:   Return in about 4 weeks (around 8/5/2025) for Return with Nataliya Olson PA-C.    Please note that this dictation was created using voice recognition software. I have made every reasonable attempt to correct obvious errors, but it is possible there are errors of grammar and possibly content that I did not discover before finalizing the note.         [1]   Past Medical History:  Diagnosis Date    Allergic rhinitis     Apnea, sleep     Arthritis     Asthma     inhalers    Back pain     Bowel habit changes     constipation    Bronchiectasis (HCC)     Bronchitis     Carpal tunnel syndrome     Chest pain     Chest tightness     Cold 4-8-2017    cold; taking antibiotics  4-    Constipation     Cough   "   Daytime sleepiness     Depression     Difficulty breathing     Difficulty swallowing     DJD (degenerative joint disease)     Frequent urination     GERD (gastroesophageal reflux disease)     GERD (gastroesophageal reflux disease)     Hearing difficulty     Heart burn     Heartburn     Hiatus hernia syndrome     Hyperglycemia 5/31/2013    Hypertension     Influenza     Insomnia     Lumps on the skin     Nasal drainage     Obesity     Obstructive sleep apnea     Pain     back, knees    Painful joint     Palpitations     Pneumonia     Pulmonary hypertension (HCC)     Rash     Rhinitis     Shortness of breath     Skin change     Sleep apnea     CPAP    Snoring     Sore muscles     Sore throat, chronic     Sorethroat 2/09/2016    Swelling of lower extremity     Thrush     from \"Advair\", takes nystatin    Tonsillitis     Urinary incontinence     Wears glasses     Wheezing    [2]   Past Surgical History:  Procedure Laterality Date    SINUS WASHING Bilateral 4/22/2021    Procedure: IRRIGATION, PARANASAL SINUS - FOR INTRAOP POSTOP SINUS DEBRIDEMENT.;  Surgeon: Yon Rojas M.D.;  Location: SURGERY SAME DAY [facility];  Service: Ent    OR REPAIR OF NASAL SEPTUM  4/15/2021    Procedure: SEPTOPLASTY, NOSE;  Surgeon: Yon Rojas M.D.;  Location: SURGERY SAME DAY [facility];  Service: Ent    ENDOSCOPY, PARANASAL SINUS, WITH TOTAL ETHMOIDECTOMY, SS Bilateral 4/15/2021    Procedure: ENDOSCOPY, PARANASAL SINUS, WITH TOTAL ETHMOIDECTOMY, SPHENOIDOTOMY, MAXILLARY ANTROSTOMY, MAXILLARY SINUS TISS REM, AND EXPLOR FRONT SINUS;  Surgeon: Yon Rojas M.D.;  Location: SURGERY SAME DAY [facility];  Service: Ent    STEREOTACTIC COMPUTER ASSISTED PROCEDURE CRANIAL, EXTRADURAL  4/15/2021    Procedure: STEREOTACTIC COMPUTER ASSISTED PROCEDURE CRANIAL,EXTRADURAL;  Surgeon: Yon Rojas M.D.;  Location: SURGERY SAME DAY [facility];  Service: Ent    SINUS TREPHINE FRONTAL  4/15/2021    Procedure: SINUSOTOMY, FRONTAL SINUS, USING " TREPHINE-TYPE DEVICE - ENDOSCOPIC.;  Surgeon: Yon Rojas M.D.;  Location: SURGERY SAME DAY [facility];  Service: Ent    KNEE ARTHROPLASTY TOTAL Right 5/1/2017    Procedure: KNEE ARTHROPLASTY TOTAL;  Surgeon: Jesús Rutledge M.D.;  Location: SURGERY [facility];  Service:     KNEE ARTHROPLASTY TOTAL Left 3/7/2016    Procedure: KNEE ARTHROPLASTY TOTAL;  Surgeon: Jesús Rutledge M.D.;  Location: SURGERY [facility];  Service:     FUSION, SPINE, LUMBAR, PLIF  9/13/2013    Performed by Kaushal Ayala M.D. at SURGERY [facility]    LUMBAR LAMINECTOMY DISKECTOMY  9/13/2013    Performed by Kaushal Ayala M.D. at SURGERY [facility]    LUMBAR LAMINECTOMY DISKECTOMY  5/30/2013    Performed by Kaushal Ayala M.D. at SURGERY [facility]    CHOLECYSTECTOMY  2010    laparoscopic    OTHER NEUROLOGICAL SURG  2/2009    I&D of brain from sinus infection    FORAMINOTOMY  11/26/08    Performed by [facility] STALLWORTH at SURGERY [facility]    HARDWARE REMOVAL NEURO  11/26/08    Performed by [facility] at SURGERY [facility]    KNEE ARTHROSCOPY Right 2008    FUSION, SPINE, LUMBAR, PLIF  2005    CARPAL TUNNEL RELEASE Right 2000    SHOULDER ARTHROTOMY Right 2000    ARTHROSCOPY, KNEE      EYE SURGERY Bilateral     Cataract surgery 2/2018, 3/2018    LAMINOTOMY      ND REMV 2ND CATARACT,CORN-SCLER SECTN      TONSILLECTOMY     [3]   Current Outpatient Medications   Medication Sig Dispense Refill    OZEMPIC, 0.25 OR 0.5 MG/DOSE, 2 MG/3ML Solution Pen-injector       famotidine (PEPCID) 40 MG Tab Take 40 mg by mouth every day.      fexofenadine (ALLEGRA) 180 MG tablet Take 180 mg by mouth every day.      Docusate Calcium (STOOL SOFTENER PO) Take  by mouth 2 Times a Day.      Multiple Vitamins-Minerals (PRESERVISION AREDS 2 PO) Take  by mouth 2 Times a Day.      benzonatate (TESSALON) 100 MG Cap Take 100 mg by mouth 3 times a day as needed for Cough.      amLODIPine (NORVASC) 10 MG Tab TAKE 1 TABLET BY  MOUTH ONCE DAILY. REPLACES 5 MG  2    ipratropium-albuterol (DUONEB) 0.5-2.5 (3) MG/3ML nebulizer solution 3 mL by Nebulization route 4 times a day.      fluticasone-salmeterol (ADVAIR) 500-50 MCG/DOSE AEROSOL POWDER, BREATH ACTIVATED Inhale 1 Puff by mouth every 12 hours.      multivitamin (THERAGRAN) Tab Take 1 Tab by mouth every day.      Multiple Vitamins-Minerals (HAIR SKIN AND NAILS FORMULA) Tab Take 1 Tab by mouth every day.      duloxetine (CYMBALTA) 60 MG Cap DR Particles delayed-release capsule Take 60 mg by mouth every day.      albuterol 108 (90 Base) MCG/ACT Aero Soln inhalation aerosol Inhale 2 Puffs by mouth every 6 hours as needed for Shortness of Breath. Shortness of breath      losartan (COZAAR) 100 MG TABS Take 100 mg by mouth every day.      nystatin (MYCOSTATIN) 853199 UNIT/ML SUSP Take 100,000 Units by mouth 5 Times a Day.      montelukast (SINGULAIR) 10 MG TABS Take 10 mg by mouth every morning.      trazodone (DESYREL) 50 MG TABS Take 50 mg by mouth every evening.      omeprazole (PRILOSEC) 20 MG CPDR Take 20 mg by mouth 2 times a day.      Azelastine HCl 137 MCG/SPRAY Solution USE 2 SPRAY(S) IN EACH NOSTRIL TWICE DAILY (Patient not taking: Reported on 7/8/2025)      Amoxicillin 500 MG Tab Take  by mouth. Prior to dental (Patient not taking: Reported on 7/8/2025)      gabapentin (NEURONTIN) 300 MG Cap Take 300 mg by mouth 4 times a day. (Patient not taking: Reported on 8/5/2024)       No current facility-administered medications for this visit.

## 2025-07-08 NOTE — PATIENT INSTRUCTIONS
1-patient with increased shortness of breath both day and night  2-would benefit from pulmonary evaluation, check with primary regarding referral  3-completed overnight oximetry last night   4-pending results  5-oxygen levels/sats were good in clinic today  6-received replacement cpap  7-reviewed compliance demonstrating use and benefit  8-met all first compliance requirements   9-adjustment in pressures due to patient feeling difficulty breathing on device  10-short term follow up to reassess

## 2025-07-21 NOTE — Clinical Note
REFERRAL APPROVAL NOTICE         Sent on July 21, 2025                   Cyndy Petit  [address]  [place] [address]                   Dear Ms. Petit,    After a careful review of the medical information and benefit coverage, [facility] has processed your referral. See below for additional details.    If applicable, you must be actively enrolled with your insurance for coverage of the authorized service. If you have any questions regarding your coverage, please contact your insurance directly.    REFERRAL INFORMATION   Referral #:  [id number]  Referred-To Department    Referred-By Provider:  Pulmonary and Sleep Medicine    Sultana Browne M.D.   Pulmonary/sleep [facility]      [address]  [place] [address]  [phone] [address]  [place] [id number] [phone]    Referral Start Date:  07/18/2025  Referral End Date:   07/18/2026           SCHEDULING  If you do not already have an appointment, please call [phone] to make an appointment.   MORE INFORMATION  As a reminder, [facility] ownership has changed, meaning this location is now owned and operated by [facility]. As such, we want to clarify that our patients should expect to receive two separate bills for the services received at [facility] - one representing the [facility] facility fees as the owner of the establishment, and the other to represent the physician's services and subsequent fees. You can speak with your insurance carrier for a pricing estimate by calling the customer service number on the back of your card and ask about charges for a hospital outpatient visit.  If you do not already have a [employer] account, sign up at: [email or web address]  You can access your medical information, make appointments, see lab results, billing  information, and more.  If you have questions regarding this referral, please contact  the [facility] department at:             [phone]. Monday - Friday 7:30AM - 5:00PM.      Sincerely,  [facility]

## 2025-08-04 ENCOUNTER — OFFICE VISIT (OUTPATIENT)
Dept: FAMILY PLANNING/WOMEN'S HEALTH CLINIC | Facility: PHYSICIAN GROUP | Age: 80
End: 2025-08-04
Payer: MEDICARE

## 2025-08-04 VITALS
DIASTOLIC BLOOD PRESSURE: 78 MMHG | OXYGEN SATURATION: 91 % | HEART RATE: 78 BPM | HEIGHT: 61 IN | BODY MASS INDEX: 49.84 KG/M2 | SYSTOLIC BLOOD PRESSURE: 126 MMHG | TEMPERATURE: 96.7 F | WEIGHT: 264 LBS

## 2025-08-04 DIAGNOSIS — H35.3210 EXUDATIVE AGE-RELATED MACULAR DEGENERATION OF RIGHT EYE, UNSPECIFIED STAGE (HCC): ICD-10-CM

## 2025-08-04 DIAGNOSIS — I10 PRIMARY HYPERTENSION: Primary | ICD-10-CM

## 2025-08-04 DIAGNOSIS — N18.31 CHRONIC KIDNEY DISEASE (CKD) STAGE G3A/A1, MODERATELY DECREASED GLOMERULAR FILTRATION RATE (GFR) BETWEEN 45-59 ML/MIN/1.73 SQUARE METER AND ALBUMINURIA CREATININE RATIO LESS THAN 30 MG/G: ICD-10-CM

## 2025-08-04 DIAGNOSIS — J45.40 MODERATE PERSISTENT ASTHMA WITHOUT COMPLICATION: ICD-10-CM

## 2025-08-04 DIAGNOSIS — M81.0 AGE-RELATED OSTEOPOROSIS WITHOUT CURRENT PATHOLOGICAL FRACTURE: ICD-10-CM

## 2025-08-04 DIAGNOSIS — E66.01 MORBID OBESITY WITH BMI OF 45.0-49.9, ADULT (HCC): ICD-10-CM

## 2025-08-04 DIAGNOSIS — F33.0 MILD EPISODE OF RECURRENT MAJOR DEPRESSIVE DISORDER (HCC): ICD-10-CM

## 2025-08-04 DIAGNOSIS — J41.1 MUCOPURULENT CHRONIC BRONCHITIS (HCC): ICD-10-CM

## 2025-08-04 PROCEDURE — G0439 PPPS, SUBSEQ VISIT: HCPCS

## 2025-08-04 PROCEDURE — 1125F AMNT PAIN NOTED PAIN PRSNT: CPT

## 2025-08-04 PROCEDURE — 3074F SYST BP LT 130 MM HG: CPT

## 2025-08-04 PROCEDURE — 3078F DIAST BP <80 MM HG: CPT

## 2025-08-04 SDOH — ECONOMIC STABILITY: FOOD INSECURITY: WITHIN THE PAST 12 MONTHS, YOU WORRIED THAT YOUR FOOD WOULD RUN OUT BEFORE YOU GOT THE MONEY TO BUY MORE.: NEVER TRUE

## 2025-08-04 SDOH — ECONOMIC STABILITY: HOUSING INSECURITY: AT ANY TIME IN THE PAST 12 MONTHS, WERE YOU HOMELESS OR LIVING IN A SHELTER (INCLUDING NOW)?: NO

## 2025-08-04 SDOH — ECONOMIC STABILITY: FOOD INSECURITY: WITHIN THE PAST 12 MONTHS, THE FOOD YOU BOUGHT JUST DIDN'T LAST AND YOU DIDN'T HAVE MONEY TO GET MORE.: NEVER TRUE

## 2025-08-04 SDOH — ECONOMIC STABILITY: HOUSING INSECURITY: IN THE LAST 12 MONTHS, WAS THERE A TIME WHEN YOU WERE NOT ABLE TO PAY THE MORTGAGE OR RENT ON TIME?: NO

## 2025-08-04 SDOH — ECONOMIC STABILITY: FOOD INSECURITY: HOW HARD IS IT FOR YOU TO PAY FOR THE VERY BASICS LIKE FOOD, HOUSING, MEDICAL CARE, AND HEATING?: NOT HARD AT ALL

## 2025-08-04 SDOH — ECONOMIC STABILITY: HOUSING INSECURITY: IN THE PAST 12 MONTHS, HOW MANY TIMES HAVE YOU MOVED WHERE YOU WERE LIVING?: 0

## 2025-08-04 SDOH — ECONOMIC STABILITY: TRANSPORTATION INSECURITY: IN THE PAST 12 MONTHS, HAS LACK OF TRANSPORTATION KEPT YOU FROM MEDICAL APPOINTMENTS OR FROM GETTING MEDICATIONS?: NO

## 2025-08-04 ASSESSMENT — ACTIVITIES OF DAILY LIVING (ADL)
TRANSPORTATION COMMENTS: HUSBAND DRIVES
BATHING_REQUIRES_ASSISTANCE: 0
LACK_OF_TRANSPORTATION: NO

## 2025-08-04 ASSESSMENT — FIBROSIS 4 INDEX: FIB4 SCORE: 1.96

## 2025-08-04 ASSESSMENT — PATIENT HEALTH QUESTIONNAIRE - PHQ9: CLINICAL INTERPRETATION OF PHQ2 SCORE: 0

## 2025-08-04 ASSESSMENT — ENCOUNTER SYMPTOMS: GENERAL WELL-BEING: GOOD

## 2025-08-04 ASSESSMENT — PAIN SCALES - GENERAL: PAINLEVEL_OUTOF10: 5=MODERATE PAIN

## 2025-08-07 ENCOUNTER — OFFICE VISIT (OUTPATIENT)
Dept: SLEEP MEDICINE | Facility: MEDICAL CENTER | Age: 80
End: 2025-08-07
Attending: PHYSICIAN ASSISTANT
Payer: MEDICARE

## 2025-08-07 VITALS
RESPIRATION RATE: 16 BRPM | SYSTOLIC BLOOD PRESSURE: 126 MMHG | OXYGEN SATURATION: 96 % | HEIGHT: 61 IN | BODY MASS INDEX: 42.67 KG/M2 | HEART RATE: 80 BPM | WEIGHT: 226 LBS | DIASTOLIC BLOOD PRESSURE: 80 MMHG

## 2025-08-07 DIAGNOSIS — G47.33 OSA ON CPAP: Primary | ICD-10-CM

## 2025-08-07 PROCEDURE — 3074F SYST BP LT 130 MM HG: CPT | Performed by: PHYSICIAN ASSISTANT

## 2025-08-07 PROCEDURE — 99213 OFFICE O/P EST LOW 20 MIN: CPT | Performed by: PHYSICIAN ASSISTANT

## 2025-08-07 PROCEDURE — 99214 OFFICE O/P EST MOD 30 MIN: CPT | Performed by: PHYSICIAN ASSISTANT

## 2025-08-07 PROCEDURE — 3079F DIAST BP 80-89 MM HG: CPT | Performed by: PHYSICIAN ASSISTANT

## 2025-08-07 ASSESSMENT — FIBROSIS 4 INDEX: FIB4 SCORE: 1.96

## 2025-08-07 ASSESSMENT — ENCOUNTER SYMPTOMS
HEADACHES: 1
SHORTNESS OF BREATH: 1
HEARTBURN: 1
ROS GI COMMENTS: NO DENTURES, MISSING TWO MOLARS, NO SWALLOWING ISSUES
COUGH: 1
TREMORS: 1
SPUTUM PRODUCTION: 1

## 2025-09-02 ENCOUNTER — APPOINTMENT (OUTPATIENT)
Dept: RADIOLOGY | Facility: MEDICAL CENTER | Age: 80
End: 2025-09-02
Attending: FAMILY MEDICINE
Payer: MEDICARE

## (undated) DEVICE — CANISTER SUCTION 3000ML MECHANICAL FILTER AUTO SHUTOFF MEDI-VAC NONSTERILE LF DISP  (40EA/CA)

## (undated) DEVICE — BOVIE FOOT CONTROL SUCTION - 6IN 10FR (25EA/CA)

## (undated) DEVICE — WATER IRRIGATION STERILE 1000ML (12EA/CA)

## (undated) DEVICE — HEAD HOLDER JUNIOR/ADULT

## (undated) DEVICE — SLEEVE VASO CALF MED - (10PR/CA)

## (undated) DEVICE — LENS/HOOD FOR SPACESUIT - (32/PK) PEEL AWAY FACE

## (undated) DEVICE — ELECTRODE 850 FOAM ADHESIVE - HYDROGEL RADIOTRNSPRNT (50/PK)

## (undated) DEVICE — ANTI-FOG SOLUTION - 60BTL/CA

## (undated) DEVICE — CANISTER SUCTION RIGID RED 1500CC (40EA/CA)

## (undated) DEVICE — SUTURE GENERAL

## (undated) DEVICE — HANDPIECE 10FT INTPLS SCT PLS IRRIGATION HAND CONTROL SET (6/PK)

## (undated) DEVICE — GLOVE BIOGEL PI ORTHO SZ 6 1/2 SURGICAL PF LF (40PR/BX)

## (undated) DEVICE — CHLORAPREP 26 ML APPLICATOR - ORANGE TINT(25/CA)

## (undated) DEVICE — TOWEL STOP TIMEOUT SAFETY FLAG (40EA/CA)

## (undated) DEVICE — GLOVE BIOGEL PI ORTHO SZ 8 PF LF (40PR/BX)

## (undated) DEVICE — SENSOR SPO2 NEO LNCS ADHESIVE (20/BX) SEE USER NOTES

## (undated) DEVICE — DRESSING MASS EYE & EAR SIZE 2 (2EA/PK  50PK/BX  100EA/BX)

## (undated) DEVICE — KIT  I.V. START (100EA/CA)

## (undated) DEVICE — SUCTION INSTRUMENT YANKAUER BULBOUS TIP W/O VENT (50EA/CA)

## (undated) DEVICE — GLOVE BIOGEL PI INDICATOR SZ 7.0 SURGICAL PF LF - (50/BX 4BX/CA)

## (undated) DEVICE — PEN SKIN MARKER W/RULER - (50EA/BX)

## (undated) DEVICE — PATTIES SURG X-RAYCOTTONOID - 1/2 X 3 IN (200/CA)

## (undated) DEVICE — WIPE INSTRUMENT MICROWIPE (20EA/SP)

## (undated) DEVICE — GLOVE BIOGEL ECLIPSE PF LATEX SIZE 7.5

## (undated) DEVICE — GOWN SURGEONS LARGE - (32/CA)

## (undated) DEVICE — ELECTRODE DUAL RETURN W/ CORD - (50/PK)

## (undated) DEVICE — SPONGE GAUZESTER. 2X2 4-PL - (2/PK 50PK/BX 30BX/CS)

## (undated) DEVICE — TUBING ENDOSCRUB II (5EA/PK)

## (undated) DEVICE — TUBE CONNECTING SUCTION - CLEAR PLASTIC STERILE 72 IN (50EA/CA)

## (undated) DEVICE — Device

## (undated) DEVICE — PACK TOTAL KNEE  (1/CA)

## (undated) DEVICE — TUBING CLEARLINK DUO-VENT - C-FLO (48EA/CA)

## (undated) DEVICE — GLOVE BIOGEL PI ORTHO SZ 7 PF LF (40PR/BX)

## (undated) DEVICE — BAG, SPONGE COUNT 50600

## (undated) DEVICE — TUBE CONNECT SUCTION CLEAR 120 X 1/4" (50EA/CA)"

## (undated) DEVICE — SODIUM CHL IRRIGATION 0.9% 1000ML (12EA/CA)

## (undated) DEVICE — MASK ANESTHESIA ADULT  - (100/CA)

## (undated) DEVICE — NEPTUNE 4 PORT MANIFOLD - (20/PK)

## (undated) DEVICE — GOWN SURGEONS X-LARGE - DISP. (30/CA)

## (undated) DEVICE — GOWN WARMING STANDARD FLEX - (30/CA)

## (undated) DEVICE — LACTATED RINGERS INJ 1000 ML - (14EA/CA 60CA/PF)

## (undated) DEVICE — SOD. CHL. INJ. 0.9% 250 ML - (36/CA 50CA/PF)

## (undated) DEVICE — CATHETER IV 20 GA X 1-1/4 ---SURG.& SDS ONLY--- (50EA/BX)

## (undated) DEVICE — SPECIMEN PLASTIC CONVERTOR - (10/CA)

## (undated) DEVICE — GLOVE BIOGEL INDICATOR SZ 7.5 SURGICAL PF LTX - (50PR/BX 4BX/CA)

## (undated) DEVICE — NEEDLE SPINAL NON-SAFETY 25GA X 3 (25EA/BX)"

## (undated) DEVICE — GLOVE SURG LTX PF ORTHO 8 - CLASSIC (40PR/BX)

## (undated) DEVICE — TIP INTPLS HFLO ML ORFC BTRY - (12/CS)  FOR SURGILAV

## (undated) DEVICE — BANDAGE ELASTIC STERILE VELCRO 6 X 5 YDS (25EA/CA)

## (undated) DEVICE — KIT ANESTHESIA W/CIRCUIT & 3/LT BAG W/FILTER (20EA/CA)

## (undated) DEVICE — PROTECTOR ULNA NERVE - (36PR/CA)

## (undated) DEVICE — SYRINGE DISP. 60 CC LL - (30/BX, 12BX/CA)**WHEN THESE ARE GONE ORDER #500206**

## (undated) DEVICE — SYS BN CMNT HI VAC KT MXR BWL - (MIX-E-VAC II)  (10EA/CA)

## (undated) DEVICE — TUBE E-T HI-LO CUFF 7.0MM (10EA/PK)

## (undated) DEVICE — TOWELS CLOTH SURGICAL - (4/PK 20PK/CA)

## (undated) DEVICE — SET LEADWIRE 5 LEAD BEDSIDE DISPOSABLE ECG (1SET OF 5/EA)

## (undated) DEVICE — MAT PATIENT POSITIONING PREVALON (10EA/CA)

## (undated) DEVICE — GLOVE BIOGEL SZ 7.5 SURGICAL PF LTX - (50PR/BX 4BX/CA)

## (undated) DEVICE — BLADE SAGITTAL SAW DUAL CUT 25.0 X 90.0 X 1.27MM (1/EA)

## (undated) DEVICE — NEEDLE W/FACET TIP DULL VERSION W/STIMULATION CABLE SONOPLEX 21G X 4 (10/EA)"

## (undated) DEVICE — PACK MINOR BASIN - (2EA/CA)

## (undated) DEVICE — SYRINGE EAR/NOSE 3 OZ STERILE (50/CA

## (undated) DEVICE — STOCKINETTE IMPERVIOUS 12X48 - STERILELF (10/CA)"

## (undated) DEVICE — BLADE STRAIGHT SINUS (5EA/PK)

## (undated) DEVICE — DRAPE LARGE 3 QUARTER - (20/CA)

## (undated) DEVICE — DRAPE SURGICAL U 77X120 - (10/CA)

## (undated) DEVICE — KIT ROOM DECONTAMINATION

## (undated) DEVICE — SPONGE XRAY 8X4 STERL. 12PL - (10EA/TY 80TY/CA)

## (undated) DEVICE — SET EXTENSION WITH 2 PORTS (48EA/CA) ***PART #2C8610 IS A SUBSTITUTE*****

## (undated) DEVICE — BLADE SURGICAL #15 - (50/BX 3BX/CA)

## (undated) DEVICE — SUTURE 4-0 PLAIN GUT SC-1 ETHICON (36PK/BX)

## (undated) DEVICE — SUTURE 1 VICRYL PLUS CTX - 8 X 18 INCH (12/BX)

## (undated) DEVICE — GLOVE SURGICAL PROTEXIS PI 8.0 LF - (50PR/BX)

## (undated) DEVICE — GLOVE SZ 7.5 BIOGEL PI MICRO - PF LF (50PR/BX)

## (undated) DEVICE — TRACKER ENT INSTRUMENT

## (undated) DEVICE — GLOVE SZ 7 BIOGEL PI MICRO - PF LF (50PR/BX 4BX/CA)

## (undated) DEVICE — MASK AIRWAY SIZE 4 UNIQUE SILICON (10EA/BX)

## (undated) DEVICE — CUFF TOURNIQUET 44 X 4 ONE PORT DISP - STERILE (10/CA)

## (undated) DEVICE — SHEATH SHARP SITE 30 DEGREE ENDOSCRUB II (5EA/PK)

## (undated) DEVICE — SODIUM CHL. IRRIGATION 0.9% 3000ML (4EA/CA 65CA/PF)

## (undated) DEVICE — SUTURE 2-0 VICRYL PLUS CT-1 - 8 X 18 INCH(12/BX)

## (undated) DEVICE — TRACKER ENT PATIENT

## (undated) DEVICE — PACK ENT OR - (2EA/CA)

## (undated) DEVICE — BLOCK

## (undated) DEVICE — GLOVE, LITE (PAIR)

## (undated) DEVICE — GLOVE BIOGEL SZ 7 SURGICAL PF LTX - (50PR/BX 4BX/CA)

## (undated) DEVICE — HUMID-VENT HEAT AND MOISTURE EXCHANGE- (50/BX)

## (undated) DEVICE — GLOVE BIOGEL SZ 8 SURGICAL PF LTX - (50PR/BX 4BX/CA)